# Patient Record
Sex: MALE | Race: WHITE | NOT HISPANIC OR LATINO | Employment: UNEMPLOYED | URBAN - NONMETROPOLITAN AREA
[De-identification: names, ages, dates, MRNs, and addresses within clinical notes are randomized per-mention and may not be internally consistent; named-entity substitution may affect disease eponyms.]

---

## 2017-03-05 ENCOUNTER — APPOINTMENT (OUTPATIENT)
Dept: GENERAL RADIOLOGY | Facility: HOSPITAL | Age: 47
End: 2017-03-05

## 2017-03-05 ENCOUNTER — HOSPITAL ENCOUNTER (EMERGENCY)
Facility: HOSPITAL | Age: 47
Discharge: HOME | End: 2017-03-05
Attending: EMERGENCY MEDICINE | Admitting: EMERGENCY MEDICINE

## 2017-03-05 VITALS
DIASTOLIC BLOOD PRESSURE: 92 MMHG | BODY MASS INDEX: 42.39 KG/M2 | TEMPERATURE: 98.4 F | HEART RATE: 88 BPM | WEIGHT: 313 LBS | OXYGEN SATURATION: 99 % | RESPIRATION RATE: 14 BRPM | HEIGHT: 72 IN | SYSTOLIC BLOOD PRESSURE: 140 MMHG

## 2017-03-05 DIAGNOSIS — R07.81 RIB PAIN ON RIGHT SIDE: Primary | ICD-10-CM

## 2017-03-05 PROCEDURE — 96372 THER/PROPH/DIAG INJ SC/IM: CPT

## 2017-03-05 PROCEDURE — 71101 X-RAY EXAM UNILAT RIBS/CHEST: CPT

## 2017-03-05 PROCEDURE — 99283 EMERGENCY DEPT VISIT LOW MDM: CPT

## 2017-03-05 PROCEDURE — 25010000002 KETOROLAC TROMETHAMINE PER 15 MG: Performed by: EMERGENCY MEDICINE

## 2017-03-05 RX ORDER — NAPROXEN 500 MG/1
500 TABLET ORAL 2 TIMES DAILY PRN
Qty: 14 TABLET | Refills: 0 | OUTPATIENT
Start: 2017-03-05 | End: 2020-10-05

## 2017-03-05 RX ORDER — HYDROCODONE BITARTRATE AND ACETAMINOPHEN 5; 325 MG/1; MG/1
1 TABLET ORAL EVERY 6 HOURS PRN
Qty: 12 TABLET | Refills: 0 | Status: SHIPPED | OUTPATIENT
Start: 2017-03-05 | End: 2021-07-07 | Stop reason: HOSPADM

## 2017-03-05 RX ORDER — HYDROCODONE BITARTRATE AND ACETAMINOPHEN 5; 325 MG/1; MG/1
1 TABLET ORAL ONCE
Status: COMPLETED | OUTPATIENT
Start: 2017-03-05 | End: 2017-03-05

## 2017-03-05 RX ORDER — KETOROLAC TROMETHAMINE 30 MG/ML
60 INJECTION, SOLUTION INTRAMUSCULAR; INTRAVENOUS ONCE
Status: COMPLETED | OUTPATIENT
Start: 2017-03-05 | End: 2017-03-05

## 2017-03-05 RX ADMIN — KETOROLAC TROMETHAMINE 60 MG: 30 INJECTION, SOLUTION INTRAMUSCULAR at 19:44

## 2017-03-05 RX ADMIN — HYDROCODONE BITARTRATE AND ACETAMINOPHEN 1 TABLET: 5; 325 TABLET ORAL at 21:06

## 2017-03-06 NOTE — DISCHARGE INSTRUCTIONS
"Most recent vital signs:   Visit Vitals   • /97   • Pulse 94   • Temp 98.2 °F (36.8 °C) (Oral)   • Resp 20   • Ht 72\" (182.9 cm)   • Wt (!) 313 lb (142 kg)   • SpO2 96%   • BMI 42.45 kg/m2        Below you fill find any summaries of imaging results and further discharge instructions as discussed during your visit.     XR Ribs Right With PA Chest    (Results Pending)        --PLEASE READ THESE DIRECTIONS IN FULL--     Our goal is to provide the best care we can AND to find any medical or surgical emergency while you are in the ER. If a medical or surgical emergency was not identified during your visit (or if the issue was resolved during the visit), following these directions for follow-up with a primary care provider and/or specialists and following the return precautions will be the safest and most effective way to protect your health after leaving the emergency room.     Therefore, in addition to the conversation we had in the room, please read all of the information in these discharge instructions, take medications as directed, and follow-up as directed.     You should seek immediate medical help for:     Worsening pain that is not helped with prescribed pain medicines and/or the recommended doses of over the counter pain medicines such as acetaminophen (Tylenol) or ibuprofen (Motrin/Advil)*.   New or worsening chest pain or trouble breathing   New or worsening headache, confusion, weakness, or visual changes   New or worsening fever (>100.4 F) that does not improve with the recommended doses of over the counter fever treatments such as acetaminophen (Tylenol) or ibuprofen (Motrin/Advil)* for more than a few hours.   Any other concerns that you feel could be life, limb, or eye-sight threatening     As a reminder, if you received any medicines or prescriptions for medicines that may be sedating (\"narcotics\", \"opioids\"), do NOT:   - operate any vehicles   - take if you are already tired   - take if you have " had or plan to have alcohol   - perform other responsibilities that require your full attention.     Common medications include, but are not limited to:   Opiates: Morphine, Norco, Lortab, Vicodin, Percocet, oxycodone, hydrocodone, Dilaudid, hydromorphone   Benzodiazepines: Ativan, Valium, alprazolam, lorazepam, diazepam,   Other sedating medications: promethazine, Phenergan, trazodone, Benadryl, hydroxyzine, Vistaril, diphenhydramine, zolpidem, and Ambien     *If you have any history of GI (stomach/intestinal) bleeding, allergic reactions, kidney problems, and/or certain heart problems or there is any chance you could be pregnant, and have been told to avoid NSAIDs (ibuprofen, naproxen, Aleve, Motrin, Advil) please avoid these medications. Please remember that prescribed pain medicines often contain Tylenol/acetaminophen, so make sure your total daily (24 hour) intake does not exceed 4 grams or 4000mg.

## 2017-03-06 NOTE — ED PROVIDER NOTES
TRIAGE CHIEF COMPLAINT:   Chief Complaint   Patient presents with   • Rib Injury      HPI: Kb Minaya is a 47 y.o. male who presents to the emergency department complaining of pain in his right lower ribs.  Patient states he is trying to pop his wife's back yesterday when he did he felt a pop in his right lower ribs and spine having increasing pain since that time.  Describes sharp pain is worse with palpation or with movement or deep inspiration.  Denies shortness of breath only pain when he tries breathes.     REVIEW OF SYSTEMS: Otherwise negative unless stated above     PAST MEDICAL HISTORY:   Past Medical History   Diagnosis Date   • Myocardial infarction    • Pilonidal cyst    • Plantar fasciitis         FAMILY HISTORY:   History reviewed. No pertinent family history.     SOCIAL HISTORY:   Social History     Social History   • Marital status:      Spouse name: N/A   • Number of children: N/A   • Years of education: N/A     Occupational History   • Not on file.     Social History Main Topics   • Smoking status: Never Smoker   • Smokeless tobacco: Not on file   • Alcohol use No   • Drug use: No   • Sexual activity: Defer     Other Topics Concern   • Not on file     Social History Narrative   • No narrative on file        SURGICAL HISTORY:   Past Surgical History   Procedure Laterality Date   • Coronary angioplasty     • Colonoscopy     • Plantar fascia release          CURRENT MEDICATIONS:      Medication List      Notice     You have not been prescribed any medications.         ALLERGIES: Morphine and related; Penicillins; and Pradaxa [dabigatran etexilate mesylate]     PHYSICAL EXAM:   VITAL SIGNS:   Vitals:    03/05/17 1931   BP: 165/97   Pulse: 94   Resp: 20   Temp: 98.2 °F (36.8 °C)   SpO2: 96%      CONSTITUTIONAL: Awake, oriented, appears non-toxic   HENT: Atraumatic, normocephalic, oral mucosa pink and moist, airway patent.  EYES: Conjunctiva clear  NECK: Trachea midline, non-tender, supple    CARDIOVASCULAR: Normal heart rate, Normal rhythm, No murmurs, rubs, gallops   PULMONARY/CHEST: Clear to auscultation, no rhonchi, wheezes, or rales. Symmetrical breath sounds.  There is tenderness to palpation along the lower right border of the rib cage from the center of the chest laterally to the right side.  ABDOMINAL: Non-distended, soft, non-tender - no rebound or guarding.   NEUROLOGIC: Non-focal, moving all four extremities, no gross sensory or motor deficits.   EXTREMITIES: No clubbing, cyanosis, or edema   SKIN: Warm, Dry, No erythema, No rash     ED COURSE / MEDICAL DECISION MAKING:   Kb Minaya is a 47 y.o. male who presents to the emergency department for evaluation of right lower rib pain.  Patient in no acute distress on arrival.  Vital signs are stable.  Exam does reveal tenderness to palpation in the right lower ribs.  Obtained rib films for further evaluation.  Per my interpretation did not reveal any obvious fracture dislocation.  No pneumothorax.  We will treat the patient symptomatically.  Return precautions discussed.    DECISION TO DISCHARGE/ADMIT: see ED care timeline     FINAL IMPRESSION:   1 -- rib sprain   2 --   3 --     Electronically signed by: Hannah Norris MD, 3/5/2017 7:38 PM       Hannah Norris MD  03/05/17 2052

## 2017-03-10 ENCOUNTER — APPOINTMENT (OUTPATIENT)
Dept: CT IMAGING | Facility: HOSPITAL | Age: 47
End: 2017-03-10

## 2017-03-10 ENCOUNTER — HOSPITAL ENCOUNTER (EMERGENCY)
Facility: HOSPITAL | Age: 47
Discharge: HOME | End: 2017-03-10
Attending: EMERGENCY MEDICINE | Admitting: EMERGENCY MEDICINE

## 2017-03-10 VITALS
OXYGEN SATURATION: 97 % | HEIGHT: 72 IN | SYSTOLIC BLOOD PRESSURE: 166 MMHG | BODY MASS INDEX: 42.39 KG/M2 | TEMPERATURE: 97.9 F | RESPIRATION RATE: 18 BRPM | HEART RATE: 69 BPM | WEIGHT: 313 LBS | DIASTOLIC BLOOD PRESSURE: 103 MMHG

## 2017-03-10 DIAGNOSIS — IMO0001 ELEVATED BLOOD PRESSURE: ICD-10-CM

## 2017-03-10 DIAGNOSIS — R07.81 RIB PAIN: Primary | ICD-10-CM

## 2017-03-10 PROCEDURE — 96372 THER/PROPH/DIAG INJ SC/IM: CPT

## 2017-03-10 PROCEDURE — 99282 EMERGENCY DEPT VISIT SF MDM: CPT

## 2017-03-10 PROCEDURE — 25010000002 KETOROLAC TROMETHAMINE PER 15 MG: Performed by: EMERGENCY MEDICINE

## 2017-03-10 PROCEDURE — 71250 CT THORAX DX C-: CPT

## 2017-03-10 RX ORDER — CYCLOBENZAPRINE HCL 5 MG
5 TABLET ORAL 3 TIMES DAILY PRN
Qty: 15 TABLET | Refills: 0 | Status: SHIPPED | OUTPATIENT
Start: 2017-03-10 | End: 2018-01-05

## 2017-03-10 RX ORDER — HYDROCODONE BITARTRATE AND ACETAMINOPHEN 5; 325 MG/1; MG/1
1 TABLET ORAL EVERY 6 HOURS PRN
Qty: 10 TABLET | Refills: 0 | Status: SHIPPED | OUTPATIENT
Start: 2017-03-10 | End: 2021-07-07 | Stop reason: HOSPADM

## 2017-03-10 RX ORDER — NAPROXEN 500 MG/1
500 TABLET ORAL 2 TIMES DAILY PRN
Qty: 14 TABLET | Refills: 0 | Status: SHIPPED | OUTPATIENT
Start: 2017-03-10 | End: 2018-01-05

## 2017-03-10 RX ORDER — KETOROLAC TROMETHAMINE 30 MG/ML
60 INJECTION, SOLUTION INTRAMUSCULAR; INTRAVENOUS ONCE
Status: COMPLETED | OUTPATIENT
Start: 2017-03-10 | End: 2017-03-10

## 2017-03-10 RX ADMIN — KETOROLAC TROMETHAMINE 60 MG: 30 INJECTION, SOLUTION INTRAMUSCULAR at 15:36

## 2017-03-10 NOTE — ED PROVIDER NOTES
TRIAGE CHIEF COMPLAINT:   Chief Complaint   Patient presents with   • Rib Injury      HPI: Kb Minaya is a 47 y.o. male who presents to the emergency department complaining of some right-sided rib discomfort.  Patient injured ribs approximately a week earlier and was seen in the emergency department with negative x-rays at that time.  Patient states the past few days the pain has been worse and he accidentally cough to the other night and the pain is been very severe since that time.  Denies any additional trauma.  States it is painful to breathe in that spot.     REVIEW OF SYSTEMS: Otherwise negative unless stated above     PAST MEDICAL HISTORY:   Past Medical History   Diagnosis Date   • Myocardial infarction    • Pilonidal cyst    • Plantar fasciitis         FAMILY HISTORY:   History reviewed. No pertinent family history.     SOCIAL HISTORY:   Social History     Social History   • Marital status:      Spouse name: N/A   • Number of children: N/A   • Years of education: N/A     Occupational History   • Not on file.     Social History Main Topics   • Smoking status: Never Smoker   • Smokeless tobacco: Not on file   • Alcohol use No   • Drug use: No   • Sexual activity: Defer     Other Topics Concern   • Not on file     Social History Narrative        SURGICAL HISTORY:   Past Surgical History   Procedure Laterality Date   • Coronary angioplasty     • Colonoscopy     • Plantar fascia release          CURRENT MEDICATIONS:      Medication List      ASK your doctor about these medications          HYDROcodone-acetaminophen 5-325 MG per tablet   Commonly known as:  NORCO   Take 1 tablet by mouth Every 6 (Six) Hours As Needed for moderate pain   (4-6).       naproxen 500 MG tablet   Commonly known as:  NAPROSYN   Take 1 tablet by mouth 2 (Two) Times a Day As Needed for mild pain (1-3).            ALLERGIES: Morphine and related; Penicillins; and Pradaxa [dabigatran etexilate mesylate]     PHYSICAL EXAM:    VITAL SIGNS:   Vitals:    03/10/17 1504   BP: (!) 182/102   Pulse: 87   Resp: 18   Temp: 98 °F (36.7 °C)   SpO2: 96%      CONSTITUTIONAL: Awake, oriented, appears non-toxic   HENT: Atraumatic, normocephalic, oral mucosa pink and moist, airway patent.   EYES: Conjunctiva clear  NECK: Trachea midline, non-tender, supple   CARDIOVASCULAR: Normal heart rate, Normal rhythm, No murmurs, rubs, gallops   PULMONARY/CHEST: Clear to auscultation, no rhonchi, wheezes, or rales. Symmetrical breath sounds.  Moderate tenderness in the anterolateral right-sided ribs along the lower border of the rib cage   ABDOMINAL: Non-distended, soft, non-tender - no rebound or guarding.  NEUROLOGIC: Non-focal, moving all four extremities, no gross sensory or motor deficits.   EXTREMITIES: No clubbing, cyanosis, or edema   SKIN: Warm, Dry, No erythema, No rash     ED COURSE / MEDICAL DECISION MAKING:   Kb Minaya is a 47 y.o. male who presents to the emergency department for evaluation of right-sided rib pain.  He had injured his ribs previously.  No signs or symptoms of coronary disease or blood clots, history and physical consistent with injury to ribs.  Don't feel labs or EKG are indicated.  Did obtain a CT scan to rule out rib fracture.  This did not reveal any acute abnormalities.  I suspect a muscular injury within the ribs.  We will continue to treat symptomatically and have him follow with his primary care physician.    DECISION TO DISCHARGE/ADMIT: see ED care timeline     FINAL IMPRESSION:   1 -- rib pain   2 -- elevated blood pressure  3 --     Electronically signed by: Hannah Norris MD, 3/10/2017 3:16 PM       Hannah Norris MD  03/10/17 3491

## 2017-03-10 NOTE — DISCHARGE INSTRUCTIONS
Please follow-up with your primary care physician and have your blood pressure rechecked as you might require treatment with blood pressure medication.    Below you fill find any summaries of imaging results and further discharge instructions as discussed during your visit.     CT Chest Without Contrast   Final Result   No acute process.       943.06 mGy.cm             This study was performed with techniques to keep radiation doses as low   as reasonably achievable (ALARA). Individualized dose reduction   techniques using automated exposure control or adjustment of mA and/or   kV according to the patient size were employed.        This report was finalized on 3/10/2017 3:58 PM by Chi Patel M.D..           --PLEASE READ THESE DIRECTIONS IN FULL--     Our goal is to provide the best care we can AND to find any medical or surgical emergency while you are in the ER. If a medical or surgical emergency was not identified during your visit (or if the issue was resolved during the visit), following these directions for follow-up with a primary care provider and/or specialists and following the return precautions will be the safest and most effective way to protect your health after leaving the emergency room.     Therefore, in addition to the conversation we had in the room, please read all of the information in these discharge instructions, take medications as directed, and follow-up as directed.     You should seek immediate medical help for:     Worsening pain that is not helped with prescribed pain medicines and/or the recommended doses of over the counter pain medicines such as acetaminophen (Tylenol) or ibuprofen (Motrin/Advil)*.   New or worsening chest pain or trouble breathing   New or worsening headache, confusion, weakness, or visual changes   New or worsening fever (>100.4 F) that does not improve with the recommended doses of over the counter fever treatments such as acetaminophen (Tylenol) or  "ibuprofen (Motrin/Advil)* for more than a few hours.   Any other concerns that you feel could be life, limb, or eye-sight threatening     As a reminder, if you received any medicines or prescriptions for medicines that may be sedating (\"narcotics\", \"opioids\"), do NOT:   - operate any vehicles   - take if you are already tired   - take if you have had or plan to have alcohol   - perform other responsibilities that require your full attention.     Common medications include, but are not limited to:   Opiates: Morphine, Norco, Lortab, Vicodin, Percocet, oxycodone, hydrocodone, Dilaudid, hydromorphone   Benzodiazepines: Ativan, Valium, alprazolam, lorazepam, diazepam,   Other sedating medications: promethazine, Phenergan, trazodone, Benadryl, hydroxyzine, Vistaril, diphenhydramine, zolpidem, and Ambien     *If you have any history of GI (stomach/intestinal) bleeding, allergic reactions, kidney problems, and/or certain heart problems or there is any chance you could be pregnant, and have been told to avoid NSAIDs (ibuprofen, naproxen, Aleve, Motrin, Advil) please avoid these medications. Please remember that prescribed pain medicines often contain Tylenol/acetaminophen, so make sure your total daily (24 hour) intake does not exceed 4 grams or 4000mg.    "

## 2017-06-26 ENCOUNTER — HOSPITAL ENCOUNTER (EMERGENCY)
Facility: HOSPITAL | Age: 47
Discharge: HOME | End: 2017-06-26
Attending: EMERGENCY MEDICINE | Admitting: EMERGENCY MEDICINE

## 2017-06-26 VITALS
WEIGHT: 308 LBS | OXYGEN SATURATION: 97 % | DIASTOLIC BLOOD PRESSURE: 88 MMHG | HEART RATE: 79 BPM | RESPIRATION RATE: 17 BRPM | SYSTOLIC BLOOD PRESSURE: 141 MMHG | BODY MASS INDEX: 40.82 KG/M2 | HEIGHT: 73 IN | TEMPERATURE: 98.2 F

## 2017-06-26 DIAGNOSIS — G89.29 ACUTE EXACERBATION OF CHRONIC LOW BACK PAIN: Primary | ICD-10-CM

## 2017-06-26 DIAGNOSIS — M54.50 ACUTE EXACERBATION OF CHRONIC LOW BACK PAIN: Primary | ICD-10-CM

## 2017-06-26 DIAGNOSIS — M53.87 SCIATICA ASSOCIATED WITH DISORDER OF LUMBOSACRAL SPINE: ICD-10-CM

## 2017-06-26 PROCEDURE — 25010000002 KETOROLAC TROMETHAMINE PER 15 MG: Performed by: EMERGENCY MEDICINE

## 2017-06-26 PROCEDURE — 99283 EMERGENCY DEPT VISIT LOW MDM: CPT

## 2017-06-26 PROCEDURE — 96372 THER/PROPH/DIAG INJ SC/IM: CPT

## 2017-06-26 PROCEDURE — 25010000002 TRIAMCINOLONE PER 10 MG: Performed by: EMERGENCY MEDICINE

## 2017-06-26 PROCEDURE — 25010000002 ORPHENADRINE CITRATE PER 60 MG: Performed by: EMERGENCY MEDICINE

## 2017-06-26 RX ORDER — METHYLPREDNISOLONE 4 MG/1
TABLET ORAL
Qty: 21 TABLET | Refills: 0 | Status: SHIPPED | OUTPATIENT
Start: 2017-06-26 | End: 2021-08-23 | Stop reason: HOSPADM

## 2017-06-26 RX ORDER — HYDROCODONE BITARTRATE AND ACETAMINOPHEN 7.5; 325 MG/1; MG/1
1 TABLET ORAL ONCE
Status: COMPLETED | OUTPATIENT
Start: 2017-06-26 | End: 2017-06-26

## 2017-06-26 RX ORDER — HYDROCODONE BITARTRATE AND ACETAMINOPHEN 7.5; 325 MG/1; MG/1
1 TABLET ORAL EVERY 8 HOURS PRN
Qty: 14 TABLET | Refills: 0 | Status: SHIPPED | OUTPATIENT
Start: 2017-06-26 | End: 2021-07-06

## 2017-06-26 RX ORDER — TRIAMCINOLONE ACETONIDE 40 MG/ML
80 INJECTION, SUSPENSION INTRA-ARTICULAR; INTRAMUSCULAR ONCE
Status: COMPLETED | OUTPATIENT
Start: 2017-06-26 | End: 2017-06-26

## 2017-06-26 RX ORDER — KETOROLAC TROMETHAMINE 30 MG/ML
60 INJECTION, SOLUTION INTRAMUSCULAR; INTRAVENOUS ONCE
Status: COMPLETED | OUTPATIENT
Start: 2017-06-26 | End: 2017-06-26

## 2017-06-26 RX ORDER — ORPHENADRINE CITRATE 30 MG/ML
60 INJECTION INTRAMUSCULAR; INTRAVENOUS EVERY 12 HOURS
Status: DISCONTINUED | OUTPATIENT
Start: 2017-06-26 | End: 2017-06-26 | Stop reason: HOSPADM

## 2017-06-26 RX ORDER — CYCLOBENZAPRINE HCL 10 MG
10 TABLET ORAL 3 TIMES DAILY
Qty: 20 TABLET | Refills: 0 | Status: SHIPPED | OUTPATIENT
Start: 2017-06-26 | End: 2021-07-06

## 2017-06-26 RX ADMIN — KETOROLAC TROMETHAMINE 60 MG: 60 INJECTION, SOLUTION INTRAMUSCULAR at 18:31

## 2017-06-26 RX ADMIN — TRIAMCINOLONE ACETONIDE 80 MG: 40 INJECTION, SUSPENSION INTRA-ARTICULAR; INTRAMUSCULAR at 18:35

## 2017-06-26 RX ADMIN — HYDROCODONE BITARTRATE AND ACETAMINOPHEN 1 TABLET: 7.5; 325 TABLET ORAL at 18:30

## 2017-06-26 RX ADMIN — ORPHENADRINE CITRATE 60 MG: 30 INJECTION INTRAMUSCULAR; INTRAVENOUS at 18:32

## 2017-06-26 NOTE — ED PROVIDER NOTES
Subjective   HPI Comments: Patient presents to the emergency department with complaint of acute and chronic low back pain.  Patient has been tolerating low back pain for several years and has had a pain management provider Q's steroid injections in his back a few years ago which were ineffective.  When he lost his insurance shortly thereafter, he has been managing his pain at home often without success.  His pain began to exacerbate in the last 3 weeks after going back to work after a year of unemployment.  Patient states he is very active at work and climbs ladders often.  His pain radiates into both hips.  He has a history of neuropathy to both feet.  Denies any falls.  No saddle anesthesia      History provided by:  Patient and spouse   used: No        Review of Systems   Constitutional: Negative.  Negative for diaphoresis and fever.   HENT: Negative for sore throat.    Eyes: Negative.  Negative for pain and visual disturbance.   Respiratory: Negative for cough, shortness of breath, wheezing and stridor.    Cardiovascular: Negative.  Negative for chest pain.   Gastrointestinal: Negative.  Negative for abdominal pain, diarrhea, nausea and vomiting.   Endocrine: Negative.    Genitourinary: Negative.  Negative for dysuria.   Musculoskeletal: Positive for back pain. Negative for neck pain.   Skin: Negative.  Negative for pallor and rash.   Allergic/Immunologic: Negative.    Neurological: Negative.  Negative for syncope, light-headedness and headaches.   Hematological: Negative.    Psychiatric/Behavioral: Negative.  Negative for agitation.   All other systems reviewed and are negative.      Past Medical History:   Diagnosis Date   • Myocardial infarction    • Pilonidal cyst    • Plantar fasciitis        Allergies   Allergen Reactions   • Morphine And Related Anaphylaxis   • Penicillins Hives   • Pradaxa [Dabigatran Etexilate Mesylate] Hives       Past Surgical History:   Procedure Laterality Date    • APPENDECTOMY     • CARDIAC CATHETERIZATION     • COLONOSCOPY     • CORONARY ANGIOPLASTY     • PLANTAR FASCIA RELEASE         History reviewed. No pertinent family history.    Social History     Social History   • Marital status:      Spouse name: N/A   • Number of children: N/A   • Years of education: N/A     Social History Main Topics   • Smoking status: Never Smoker   • Smokeless tobacco: None   • Alcohol use No   • Drug use: No   • Sexual activity: Defer     Other Topics Concern   • None     Social History Narrative   • None           Objective   Physical Exam   Constitutional: He is oriented to person, place, and time. He appears well-developed and well-nourished. No distress.   Patient is obese   HENT:   Head: Normocephalic and atraumatic.   Right Ear: External ear normal.   Left Ear: External ear normal.   Eyes: Conjunctivae and EOM are normal. Pupils are equal, round, and reactive to light.   Neck: Normal range of motion. Neck supple. No tracheal deviation present.   Cardiovascular: Normal rate and regular rhythm.    Pulmonary/Chest: Effort normal. He has no wheezes. He has no rales.   Abdominal: Soft. Bowel sounds are normal. He exhibits no distension. There is no rebound and no guarding.   Musculoskeletal: Normal range of motion. He exhibits no edema.   Patient has diffuse pain out of proportion to physical findings with light palpation of the lumbar paravertebral muscles.  Straight leg raise is negative bilaterally   Lymphadenopathy:     He has no cervical adenopathy.   Neurological: He is alert and oriented to person, place, and time. He displays normal reflexes. Coordination normal.   Skin: Skin is warm and dry. Rash noted. He is not diaphoretic. No erythema. No pallor.   Psychiatric: He has a normal mood and affect.   Nursing note and vitals reviewed.      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

## 2017-08-04 ENCOUNTER — HOSPITAL ENCOUNTER (EMERGENCY)
Facility: HOSPITAL | Age: 47
Discharge: HOME | End: 2017-08-05
Attending: EMERGENCY MEDICINE | Admitting: EMERGENCY MEDICINE

## 2017-08-04 DIAGNOSIS — K08.89 PAIN, DENTAL: Primary | ICD-10-CM

## 2017-08-04 PROCEDURE — 96372 THER/PROPH/DIAG INJ SC/IM: CPT

## 2017-08-04 PROCEDURE — 99283 EMERGENCY DEPT VISIT LOW MDM: CPT

## 2017-08-04 RX ORDER — KETOROLAC TROMETHAMINE 30 MG/ML
60 INJECTION, SOLUTION INTRAMUSCULAR; INTRAVENOUS ONCE
Status: COMPLETED | OUTPATIENT
Start: 2017-08-04 | End: 2017-08-05

## 2017-08-04 RX ORDER — ONDANSETRON 4 MG/1
4 TABLET, ORALLY DISINTEGRATING ORAL EVERY 6 HOURS PRN
Qty: 10 TABLET | Refills: 0 | Status: SHIPPED | OUTPATIENT
Start: 2017-08-04 | End: 2021-07-07 | Stop reason: HOSPADM

## 2017-08-04 RX ORDER — CLINDAMYCIN HYDROCHLORIDE 300 MG/1
300 CAPSULE ORAL 3 TIMES DAILY
COMMUNITY
End: 2021-07-06

## 2017-08-04 RX ORDER — OXYCODONE AND ACETAMINOPHEN 10; 325 MG/1; MG/1
1 TABLET ORAL ONCE
Status: COMPLETED | OUTPATIENT
Start: 2017-08-04 | End: 2017-08-05

## 2017-08-05 VITALS
WEIGHT: 306 LBS | SYSTOLIC BLOOD PRESSURE: 135 MMHG | TEMPERATURE: 98.4 F | HEART RATE: 66 BPM | HEIGHT: 73 IN | BODY MASS INDEX: 40.56 KG/M2 | RESPIRATION RATE: 16 BRPM | DIASTOLIC BLOOD PRESSURE: 89 MMHG | OXYGEN SATURATION: 95 %

## 2017-08-05 PROCEDURE — 25010000002 KETOROLAC TROMETHAMINE PER 15 MG: Performed by: PHYSICIAN ASSISTANT

## 2017-08-05 PROCEDURE — 96372 THER/PROPH/DIAG INJ SC/IM: CPT

## 2017-08-05 RX ADMIN — OXYCODONE HYDROCHLORIDE AND ACETAMINOPHEN 1 TABLET: 10; 325 TABLET ORAL at 00:20

## 2017-08-05 RX ADMIN — KETOROLAC TROMETHAMINE 60 MG: 60 INJECTION, SOLUTION INTRAMUSCULAR at 00:21

## 2017-08-05 NOTE — ED PROVIDER NOTES
Subjective   HPI Comments: 47-year-old male in the emergency room with his wife.  The patient is complaining of dental pain.  Patient saw his dentist at [facility] yesterday, he was diagnosed with a inflamed wisdom tooth.  He has taken 4 dosages of a 10 day course of clindamycin 300.  He was not given anything for pain.  He has a referral to see an oral surgeon at  on September 1.  Denies any fever, he does not have any difficulty swallowing, denies difficulty breathing.  He states he has been nauseated but denies any vomiting or diarrhea.      History provided by:  Patient and spouse  History limited by: no limits.   used: No        Review of Systems   Constitutional: Negative for activity change, appetite change, fatigue and fever.   HENT: Negative for congestion, ear pain, facial swelling, rhinorrhea, sneezing, sore throat and trouble swallowing.    Eyes: Negative for pain, discharge and redness.   Respiratory: Negative for cough, shortness of breath and wheezing.    Cardiovascular: Negative.    Gastrointestinal: Negative for abdominal pain, constipation, diarrhea, nausea and vomiting.   Endocrine: Negative.    Genitourinary: Negative for difficulty urinating, dysuria and frequency.   Musculoskeletal: Negative for back pain and neck pain.   Skin: Negative for color change, pallor and rash.   Allergic/Immunologic: Negative.    Neurological: Negative.    Hematological: Negative.    Psychiatric/Behavioral: Negative.    All other systems reviewed and are negative.      Past Medical History:   Diagnosis Date   • Myocardial infarction    • Pilonidal cyst    • Plantar fasciitis        Allergies   Allergen Reactions   • Morphine And Related Anaphylaxis   • Penicillins Hives   • Pradaxa [Dabigatran Etexilate Mesylate] Hives       Past Surgical History:   Procedure Laterality Date   • APPENDECTOMY     • CARDIAC CATHETERIZATION     • COLONOSCOPY     • CORONARY ANGIOPLASTY     • PLANTAR FASCIA RELEASE          History reviewed. No pertinent family history.    Social History     Social History   • Marital status:      Spouse name: N/A   • Number of children: N/A   • Years of education: N/A     Social History Main Topics   • Smoking status: Never Smoker   • Smokeless tobacco: None   • Alcohol use No   • Drug use: No   • Sexual activity: Defer     Other Topics Concern   • None     Social History Narrative           Objective   Physical Exam   Constitutional: He is oriented to person, place, and time. He appears well-developed and well-nourished. No distress.   HENT:   Head: Normocephalic and atraumatic.   Right Ear: External ear normal.   Left Ear: External ear normal.   Nose: Nose normal.   Mouth/Throat: Oropharynx is clear and moist. No oropharyngeal exudate.   Left lower back wisdom tooth has not erupted to the gum yet.  The gum is pink there does not appear to any swelling or redness.  There is no visible facial swelling or redness.  Airway is patent.   Eyes: Conjunctivae and EOM are normal. Pupils are equal, round, and reactive to light. Right eye exhibits no discharge. Left eye exhibits no discharge. No scleral icterus.   Neck: Normal range of motion. Neck supple. No JVD present. No tracheal deviation present.   Cardiovascular: Normal rate, regular rhythm and normal heart sounds.    Pulmonary/Chest: Effort normal and breath sounds normal. No stridor. No respiratory distress. He has no wheezes. He has no rales.   Abdominal: Soft. Bowel sounds are normal. He exhibits no mass. There is no tenderness. There is no rebound and no guarding. No hernia.   Musculoskeletal: Normal range of motion. He exhibits no edema, tenderness or deformity.   Lymphadenopathy:     He has no cervical adenopathy.   Neurological: He is alert and oriented to person, place, and time. No cranial nerve deficit. He exhibits normal muscle tone. Coordination normal.   Skin: Skin is warm and dry. No rash noted. He is not diaphoretic. No  erythema. No pallor.   Psychiatric: He has a normal mood and affect. His behavior is normal. Judgment and thought content normal.   Nursing note and vitals reviewed.      Procedures         ED Course  ED Course                  MDM    Final diagnoses:   Pain, dental            Christine SANTOSH Rodriguez  08/04/17 2549

## 2017-08-05 NOTE — DISCHARGE INSTRUCTIONS
Take all your clindamycin as directed.  Recheck with your dentist on Monday if not improving.  Return to the ER for any fever, difficulty swallowing, trouble breathing, facial swelling or any other changes that concern you.

## 2017-08-25 ENCOUNTER — APPOINTMENT (OUTPATIENT)
Dept: GENERAL RADIOLOGY | Facility: HOSPITAL | Age: 47
End: 2017-08-25

## 2017-08-25 ENCOUNTER — HOSPITAL ENCOUNTER (EMERGENCY)
Facility: HOSPITAL | Age: 47
Discharge: HOME | End: 2017-08-25
Attending: STUDENT IN AN ORGANIZED HEALTH CARE EDUCATION/TRAINING PROGRAM | Admitting: STUDENT IN AN ORGANIZED HEALTH CARE EDUCATION/TRAINING PROGRAM

## 2017-08-25 VITALS
WEIGHT: 312 LBS | RESPIRATION RATE: 20 BRPM | HEART RATE: 76 BPM | TEMPERATURE: 97.9 F | BODY MASS INDEX: 41.35 KG/M2 | SYSTOLIC BLOOD PRESSURE: 170 MMHG | DIASTOLIC BLOOD PRESSURE: 94 MMHG | HEIGHT: 73 IN | OXYGEN SATURATION: 100 %

## 2017-08-25 DIAGNOSIS — S93.509A TOE SPRAIN, INITIAL ENCOUNTER: ICD-10-CM

## 2017-08-25 DIAGNOSIS — S93.402A SPRAIN OF LEFT ANKLE, UNSPECIFIED LIGAMENT, INITIAL ENCOUNTER: Primary | ICD-10-CM

## 2017-08-25 PROCEDURE — 73610 X-RAY EXAM OF ANKLE: CPT

## 2017-08-25 PROCEDURE — 73630 X-RAY EXAM OF FOOT: CPT

## 2017-08-25 PROCEDURE — 99283 EMERGENCY DEPT VISIT LOW MDM: CPT

## 2017-08-26 NOTE — ED PROVIDER NOTES
Subjective   HPI Comments: 47-year-old male with left toe and ankle pain.  He states that he broke his left second toe several days ago get in the toe.  Is also concerned hematosis ankle.  He has pain in the left ankle and left second toe and swelling.  The pain is worse to touch and with movement      History provided by:  Patient   used: No        Review of Systems   Musculoskeletal:        Left second toe and ankle pain   All other systems reviewed and are negative.      Past Medical History:   Diagnosis Date   • Myocardial infarction    • Pilonidal cyst    • Plantar fasciitis        Allergies   Allergen Reactions   • Morphine And Related Anaphylaxis   • Penicillins Hives   • Pradaxa [Dabigatran Etexilate Mesylate] Hives       Past Surgical History:   Procedure Laterality Date   • APPENDECTOMY     • CARDIAC CATHETERIZATION     • COLONOSCOPY     • CORONARY ANGIOPLASTY     • PLANTAR FASCIA RELEASE         History reviewed. No pertinent family history.    Social History     Social History   • Marital status:      Spouse name: N/A   • Number of children: N/A   • Years of education: N/A     Social History Main Topics   • Smoking status: Never Smoker   • Smokeless tobacco: None   • Alcohol use No   • Drug use: No   • Sexual activity: Defer     Other Topics Concern   • None     Social History Narrative           Objective   Physical Exam   Constitutional: He is oriented to person, place, and time. He appears well-developed and well-nourished.   HENT:   Head: Normocephalic and atraumatic.   Left Ear: External ear normal.   Eyes: EOM are normal. Pupils are equal, round, and reactive to light.   Neck: Normal range of motion.   Cardiovascular: Normal rate and regular rhythm.    Pulmonary/Chest: Effort normal and breath sounds normal.   Abdominal: Soft. Bowel sounds are normal.   Musculoskeletal: He exhibits tenderness.   Neurological: He is alert and oriented to person, place, and time.   Skin:  Skin is warm and dry.   Psychiatric: He has a normal mood and affect. His behavior is normal. Judgment and thought content normal.   Nursing note and vitals reviewed.      Procedures         ED Course  ED Course   Comment By Time   Patient states that he has a walking boot at home that he will use for comfort Sunny Jorge Jr., PA-C 08/25 2044                  [facility]    Final diagnoses:   Sprain of left ankle, unspecified ligament, initial encounter   Toe sprain, initial encounter            Sunny Jorge Jr., PA-C  08/25/17 2046

## 2017-09-18 ENCOUNTER — APPOINTMENT (OUTPATIENT)
Dept: CT IMAGING | Facility: HOSPITAL | Age: 47
End: 2017-09-18

## 2017-09-18 ENCOUNTER — HOSPITAL ENCOUNTER (EMERGENCY)
Facility: HOSPITAL | Age: 47
Discharge: HOME | End: 2017-09-18
Attending: EMERGENCY MEDICINE | Admitting: EMERGENCY MEDICINE

## 2017-09-18 VITALS
RESPIRATION RATE: 17 BRPM | SYSTOLIC BLOOD PRESSURE: 154 MMHG | HEART RATE: 79 BPM | TEMPERATURE: 98.1 F | BODY MASS INDEX: 41.35 KG/M2 | HEIGHT: 73 IN | WEIGHT: 312 LBS | OXYGEN SATURATION: 99 % | DIASTOLIC BLOOD PRESSURE: 79 MMHG

## 2017-09-18 DIAGNOSIS — M54.5 MIDLINE LOW BACK PAIN, UNSPECIFIED CHRONICITY, WITH SCIATICA PRESENCE UNSPECIFIED: Primary | ICD-10-CM

## 2017-09-18 PROCEDURE — 72131 CT LUMBAR SPINE W/O DYE: CPT

## 2017-09-18 PROCEDURE — 99283 EMERGENCY DEPT VISIT LOW MDM: CPT

## 2017-09-18 PROCEDURE — 96372 THER/PROPH/DIAG INJ SC/IM: CPT

## 2017-09-18 PROCEDURE — 25010000002 KETOROLAC TROMETHAMINE PER 15 MG: Performed by: NURSE PRACTITIONER

## 2017-09-18 RX ORDER — KETOROLAC TROMETHAMINE 30 MG/ML
60 INJECTION, SOLUTION INTRAMUSCULAR; INTRAVENOUS ONCE
Status: COMPLETED | OUTPATIENT
Start: 2017-09-18 | End: 2017-09-18

## 2017-09-18 RX ORDER — METHOCARBAMOL 750 MG/1
750 TABLET, FILM COATED ORAL 3 TIMES DAILY
Qty: 21 TABLET | Refills: 0 | Status: SHIPPED | OUTPATIENT
Start: 2017-09-18 | End: 2021-08-23 | Stop reason: HOSPADM

## 2017-09-18 RX ORDER — MELOXICAM 7.5 MG/1
7.5 TABLET ORAL DAILY
Qty: 14 TABLET | Refills: 0 | Status: SHIPPED | OUTPATIENT
Start: 2017-09-18 | End: 2021-08-23 | Stop reason: HOSPADM

## 2017-09-18 RX ADMIN — KETOROLAC TROMETHAMINE 60 MG: 60 INJECTION, SOLUTION INTRAMUSCULAR at 18:15

## 2017-09-18 NOTE — ED PROVIDER NOTES
Subjective   History of Present Illness  This is a 47-year-old gentleman who comes in today complaining of lower back pain.  Patient reports he has a history of degenerative disc disease and he lost his balance yesterday and fell down a couple stairs hitting his lower back and landing on his bottom.  He denies any other injuries.  Review of Systems   Constitutional: Negative.    HENT: Negative.    Eyes: Negative.    Respiratory: Negative.    Cardiovascular: Negative.    Gastrointestinal: Negative.    Genitourinary: Negative.    Musculoskeletal: Positive for back pain.   Skin: Negative.    Neurological: Negative.    Psychiatric/Behavioral: Negative.    All other systems reviewed and are negative.      Past Medical History:   Diagnosis Date   • Myocardial infarction    • Pilonidal cyst    • Plantar fasciitis        Allergies   Allergen Reactions   • Morphine And Related Anaphylaxis   • Penicillins Hives   • Pradaxa [Dabigatran Etexilate Mesylate] Hives       Past Surgical History:   Procedure Laterality Date   • APPENDECTOMY     • CARDIAC CATHETERIZATION     • COLONOSCOPY     • CORONARY ANGIOPLASTY     • PLANTAR FASCIA RELEASE         History reviewed. No pertinent family history.    Social History     Social History   • Marital status:      Spouse name: N/A   • Number of children: N/A   • Years of education: N/A     Social History Main Topics   • Smoking status: Never Smoker   • Smokeless tobacco: None   • Alcohol use No   • Drug use: No   • Sexual activity: Defer     Other Topics Concern   • None     Social History Narrative           Objective   Physical Exam   Constitutional: He appears well-developed and well-nourished.   Nursing note and vitals reviewed.  GEN: No acute distress  Head: Normocephalic, atraumatic  Eyes: Pupils equal round reactive to light  ENT: Posterior pharynx normal in appearance, oral mucosa is moist  Chest: Nontender to palpation  Cardiovascular: Regular rate  Lungs: Clear to  auscultation bilaterally  Abdomen: Soft, nontender, nondistended, no peritoneal signs  Extremities: No edema, normal appearance  Tender L5-S1 data straight leg raise  Neuro: GCS 15  Psych: Mood and affect are appropriate      Procedures         ED Course  ED Course                  MDM  Number of Diagnoses or Management Options  Diagnosis management comments: In reviewing medical records he's been to the emergency department with pain complaints 8 times in the last 7 months.  When questioned about his chronic pain he states he's been out of his pain medications that he takes for his chronic back pain for the last year due to losing his health insurance and not been able to afford to go to the doctor.  I suspect this may be an exacerbation of his degenerative disc disease we will go ahead and do a CT of his L-spine since he does complain of pain after fall and he has tenderness.       Amount and/or Complexity of Data Reviewed  Tests in the radiology section of CPT®: ordered and reviewed  Review and summarize past medical records: yes    Risk of Complications, Morbidity, and/or Mortality  Presenting problems: low  Diagnostic procedures: moderate  Management options: low        Final diagnoses:   Midline low back pain, unspecified chronicity, with sciatica presence unspecified            Sharyn Rea, APRN  09/18/17 1935

## 2017-10-13 ENCOUNTER — HOSPITAL ENCOUNTER (EMERGENCY)
Facility: HOSPITAL | Age: 47
Discharge: HOME | End: 2017-10-13
Attending: EMERGENCY MEDICINE | Admitting: EMERGENCY MEDICINE

## 2017-10-13 ENCOUNTER — APPOINTMENT (OUTPATIENT)
Dept: CT IMAGING | Facility: HOSPITAL | Age: 47
End: 2017-10-13

## 2017-10-13 VITALS
SYSTOLIC BLOOD PRESSURE: 136 MMHG | RESPIRATION RATE: 18 BRPM | HEART RATE: 56 BPM | HEIGHT: 73 IN | OXYGEN SATURATION: 97 % | BODY MASS INDEX: 39.76 KG/M2 | WEIGHT: 300 LBS | DIASTOLIC BLOOD PRESSURE: 85 MMHG | TEMPERATURE: 98.2 F

## 2017-10-13 DIAGNOSIS — G44.89 OTHER HEADACHE SYNDROME: Primary | ICD-10-CM

## 2017-10-13 DIAGNOSIS — R11.2 NAUSEA AND VOMITING, INTRACTABILITY OF VOMITING NOT SPECIFIED, UNSPECIFIED VOMITING TYPE: ICD-10-CM

## 2017-10-13 DIAGNOSIS — M54.50 ACUTE EXACERBATION OF CHRONIC LOW BACK PAIN: ICD-10-CM

## 2017-10-13 DIAGNOSIS — L72.9 CUTANEOUS CYST: ICD-10-CM

## 2017-10-13 DIAGNOSIS — G89.29 ACUTE EXACERBATION OF CHRONIC LOW BACK PAIN: ICD-10-CM

## 2017-10-13 DIAGNOSIS — R03.0 ELEVATED BLOOD PRESSURE READING: ICD-10-CM

## 2017-10-13 DIAGNOSIS — I25.2 HISTORY OF MI (MYOCARDIAL INFARCTION): ICD-10-CM

## 2017-10-13 LAB
ALBUMIN SERPL-MCNC: 4.1 G/DL (ref 3.5–5)
ALBUMIN/GLOB SERPL: 1.4 G/DL (ref 1–2)
ALP SERPL-CCNC: 81 U/L (ref 38–126)
ALT SERPL W P-5'-P-CCNC: 81 U/L (ref 13–69)
AMPHET+METHAMPHET UR QL: NEGATIVE
AMPHETAMINES UR QL: NEGATIVE
ANION GAP SERPL CALCULATED.3IONS-SCNC: 13.9 MMOL/L
AST SERPL-CCNC: 43 U/L (ref 15–46)
BACTERIA UR QL AUTO: ABNORMAL /HPF
BARBITURATES UR QL SCN: NEGATIVE
BASOPHILS # BLD AUTO: 0.03 10*3/MM3 (ref 0–0.2)
BASOPHILS NFR BLD AUTO: 0.5 % (ref 0–2.5)
BENZODIAZ UR QL SCN: NEGATIVE
BILIRUB SERPL-MCNC: 0.8 MG/DL (ref 0.2–1.3)
BILIRUB UR QL STRIP: NEGATIVE
BUN BLD-MCNC: 10 MG/DL (ref 7–20)
BUN/CREAT SERPL: 11.1 (ref 6.3–21.9)
BUPRENORPHINE SERPL-MCNC: NEGATIVE NG/ML
CALCIUM SPEC-SCNC: 9.4 MG/DL (ref 8.4–10.2)
CANNABINOIDS SERPL QL: NEGATIVE
CHLORIDE SERPL-SCNC: 106 MMOL/L (ref 98–107)
CLARITY UR: CLEAR
CO2 SERPL-SCNC: 27 MMOL/L (ref 26–30)
COCAINE UR QL: NEGATIVE
COLOR UR: YELLOW
CREAT BLD-MCNC: 0.9 MG/DL (ref 0.6–1.3)
DEPRECATED RDW RBC AUTO: 45.2 FL (ref 37–54)
EOSINOPHIL # BLD AUTO: 0.11 10*3/MM3 (ref 0–0.7)
EOSINOPHIL NFR BLD AUTO: 1.8 % (ref 0–7)
ERYTHROCYTE [DISTWIDTH] IN BLOOD BY AUTOMATED COUNT: 13.3 % (ref 11.5–14.5)
GFR SERPL CREATININE-BSD FRML MDRD: 90 ML/MIN/1.73
GLOBULIN UR ELPH-MCNC: 3 GM/DL
GLUCOSE BLD-MCNC: 137 MG/DL (ref 74–98)
GLUCOSE UR STRIP-MCNC: NEGATIVE MG/DL
HCT VFR BLD AUTO: 46.2 % (ref 42–52)
HGB BLD-MCNC: 15.6 G/DL (ref 14–18)
HGB UR QL STRIP.AUTO: ABNORMAL
HYALINE CASTS UR QL AUTO: ABNORMAL /LPF
IMM GRANULOCYTES # BLD: 0.01 10*3/MM3 (ref 0–0.06)
IMM GRANULOCYTES NFR BLD: 0.2 % (ref 0–0.6)
KETONES UR QL STRIP: NEGATIVE
LEUKOCYTE ESTERASE UR QL STRIP.AUTO: NEGATIVE
LYMPHOCYTES # BLD AUTO: 1.79 10*3/MM3 (ref 0.6–3.4)
LYMPHOCYTES NFR BLD AUTO: 29.7 % (ref 10–50)
MCH RBC QN AUTO: 31.3 PG (ref 27–31)
MCHC RBC AUTO-ENTMCNC: 33.8 G/DL (ref 30–37)
MCV RBC AUTO: 92.6 FL (ref 80–94)
METHADONE UR QL SCN: NEGATIVE
MONOCYTES # BLD AUTO: 0.31 10*3/MM3 (ref 0–0.9)
MONOCYTES NFR BLD AUTO: 5.1 % (ref 0–12)
NEUTROPHILS # BLD AUTO: 3.78 10*3/MM3 (ref 2–6.9)
NEUTROPHILS NFR BLD AUTO: 62.7 % (ref 37–80)
NITRITE UR QL STRIP: NEGATIVE
NRBC BLD MANUAL-RTO: 0 /100 WBC (ref 0–0)
OPIATES UR QL: NEGATIVE
OXYCODONE UR QL SCN: NEGATIVE
PCP UR QL SCN: NEGATIVE
PH UR STRIP.AUTO: 7.5 [PH] (ref 5–8)
PLATELET # BLD AUTO: 147 10*3/MM3 (ref 130–400)
PMV BLD AUTO: 10.4 FL (ref 6–12)
POTASSIUM BLD-SCNC: 3.9 MMOL/L (ref 3.5–5.1)
PROPOXYPH UR QL: NEGATIVE
PROT SERPL-MCNC: 7.1 G/DL (ref 6.3–8.2)
PROT UR QL STRIP: NEGATIVE
RBC # BLD AUTO: 4.99 10*6/MM3 (ref 4.7–6.1)
RBC # UR: ABNORMAL /HPF
REF LAB TEST METHOD: ABNORMAL
SODIUM BLD-SCNC: 143 MMOL/L (ref 137–145)
SP GR UR STRIP: 1.02 (ref 1–1.03)
SQUAMOUS #/AREA URNS HPF: ABNORMAL /HPF
TRICYCLICS UR QL SCN: NEGATIVE
UROBILINOGEN UR QL STRIP: ABNORMAL
WBC NRBC COR # BLD: 6.03 10*3/MM3 (ref 4.8–10.8)
WBC UR QL AUTO: ABNORMAL /HPF

## 2017-10-13 PROCEDURE — 25010000002 KETOROLAC TROMETHAMINE PER 15 MG: Performed by: PHYSICIAN ASSISTANT

## 2017-10-13 PROCEDURE — 81001 URINALYSIS AUTO W/SCOPE: CPT | Performed by: PHYSICIAN ASSISTANT

## 2017-10-13 PROCEDURE — 96374 THER/PROPH/DIAG INJ IV PUSH: CPT

## 2017-10-13 PROCEDURE — 25010000002 METOCLOPRAMIDE PER 10 MG: Performed by: PHYSICIAN ASSISTANT

## 2017-10-13 PROCEDURE — 85025 COMPLETE CBC W/AUTO DIFF WBC: CPT | Performed by: PHYSICIAN ASSISTANT

## 2017-10-13 PROCEDURE — 70450 CT HEAD/BRAIN W/O DYE: CPT

## 2017-10-13 PROCEDURE — 80053 COMPREHEN METABOLIC PANEL: CPT | Performed by: PHYSICIAN ASSISTANT

## 2017-10-13 PROCEDURE — 96361 HYDRATE IV INFUSION ADD-ON: CPT

## 2017-10-13 PROCEDURE — 96375 TX/PRO/DX INJ NEW DRUG ADDON: CPT

## 2017-10-13 PROCEDURE — 25010000002 DIPHENHYDRAMINE PER 50 MG: Performed by: PHYSICIAN ASSISTANT

## 2017-10-13 PROCEDURE — 99283 EMERGENCY DEPT VISIT LOW MDM: CPT

## 2017-10-13 PROCEDURE — 80306 DRUG TEST PRSMV INSTRMNT: CPT | Performed by: EMERGENCY MEDICINE

## 2017-10-13 RX ORDER — DIPHENHYDRAMINE HYDROCHLORIDE 50 MG/ML
25 INJECTION INTRAMUSCULAR; INTRAVENOUS ONCE
Status: COMPLETED | OUTPATIENT
Start: 2017-10-13 | End: 2017-10-13

## 2017-10-13 RX ORDER — KETOROLAC TROMETHAMINE 30 MG/ML
10 INJECTION, SOLUTION INTRAMUSCULAR; INTRAVENOUS EVERY 6 HOURS PRN
Status: DISCONTINUED | OUTPATIENT
Start: 2017-10-13 | End: 2017-10-13 | Stop reason: HOSPADM

## 2017-10-13 RX ORDER — HYDROCODONE BITARTRATE AND ACETAMINOPHEN 5; 325 MG/1; MG/1
1 TABLET ORAL ONCE
Status: COMPLETED | OUTPATIENT
Start: 2017-10-13 | End: 2017-10-13

## 2017-10-13 RX ORDER — METOCLOPRAMIDE HYDROCHLORIDE 5 MG/ML
10 INJECTION INTRAMUSCULAR; INTRAVENOUS ONCE
Status: COMPLETED | OUTPATIENT
Start: 2017-10-13 | End: 2017-10-13

## 2017-10-13 RX ORDER — SODIUM CHLORIDE 0.9 % (FLUSH) 0.9 %
10 SYRINGE (ML) INJECTION AS NEEDED
Status: DISCONTINUED | OUTPATIENT
Start: 2017-10-13 | End: 2017-10-13 | Stop reason: HOSPADM

## 2017-10-13 RX ORDER — CLINDAMYCIN HYDROCHLORIDE 150 MG/1
150 CAPSULE ORAL 3 TIMES DAILY
Qty: 21 CAPSULE | Refills: 0 | Status: SHIPPED | OUTPATIENT
Start: 2017-10-13 | End: 2021-07-06

## 2017-10-13 RX ORDER — SODIUM CHLORIDE 9 MG/ML
125 INJECTION, SOLUTION INTRAVENOUS CONTINUOUS
Status: DISCONTINUED | OUTPATIENT
Start: 2017-10-13 | End: 2017-10-13 | Stop reason: HOSPADM

## 2017-10-13 RX ADMIN — SODIUM CHLORIDE 125 ML/HR: 9 INJECTION, SOLUTION INTRAVENOUS at 15:31

## 2017-10-13 RX ADMIN — HYDROCODONE BITARTRATE AND ACETAMINOPHEN 1 TABLET: 5; 325 TABLET ORAL at 16:55

## 2017-10-13 RX ADMIN — METOCLOPRAMIDE 10 MG: 5 INJECTION, SOLUTION INTRAMUSCULAR; INTRAVENOUS at 15:33

## 2017-10-13 RX ADMIN — DIPHENHYDRAMINE HYDROCHLORIDE 25 MG: 50 INJECTION, SOLUTION INTRAMUSCULAR; INTRAVENOUS at 15:32

## 2017-10-13 RX ADMIN — KETOROLAC TROMETHAMINE 10 MG: 30 INJECTION, SOLUTION INTRAMUSCULAR at 15:33

## 2017-10-13 NOTE — ED PROVIDER NOTES
Subjective   HPI Comments: 47-year-old male presents to emergency department complaining of a 2-3 days history of progressively worsening headache, worsening significantly this morning at around 2:30.  Associated with nausea and vomiting.  Also complains of flareup of his low back pain, and boils to his lower abdomen.  He denies fevers chills or sweats.  He does admit to photophobia with no decrease in visual field or blurred vision.  No recent falls or injuries.  He denies stiff neck.  No unilateral weakness paresis paresthesias.  Does complain of dizziness but no slurred speech.  No confusion.  He denies palpitations chest pain shortness of breath.  No abdominal pain.  He does have nausea with vomiting.    His past medical history is remarkable for MI, non-insulin-dependent diabetes    Patient is a 47 y.o. male presenting with headaches.   History provided by:  Patient  Headache   Pain location:  Generalized  Radiates to:  L shoulder, R shoulder and upper back  Severity currently:  8/10  Onset quality:  Gradual  Duration:  2 days  Timing:  Constant  Progression:  Worsening  Chronicity:  Recurrent  Similar to prior headaches: yes    Context: activity and bright light    Context: not coughing    Relieved by:  Nothing  Worsened by:  Nothing  Associated symptoms: back pain, dizziness, eye pain, loss of balance, nausea, photophobia and vomiting    Associated symptoms: no abdominal pain, no cough, no diarrhea, no facial pain, no fever, no hearing loss, no myalgias, no neck stiffness, no numbness and no paresthesias        Review of Systems   Constitutional: Negative for fever.   HENT: Negative for hearing loss.    Eyes: Positive for photophobia and pain.   Respiratory: Negative for cough.    Gastrointestinal: Positive for nausea and vomiting. Negative for abdominal pain and diarrhea.   Musculoskeletal: Positive for back pain. Negative for myalgias and neck stiffness.   Neurological: Positive for dizziness, headaches and  loss of balance. Negative for numbness and paresthesias.   All other systems reviewed and are negative.      Past Medical History:   Diagnosis Date   • Myocardial infarction    • Pilonidal cyst    • Plantar fasciitis        Allergies   Allergen Reactions   • Morphine And Related Anaphylaxis   • Penicillins Hives   • Pradaxa [Dabigatran Etexilate Mesylate] Hives       Past Surgical History:   Procedure Laterality Date   • APPENDECTOMY     • CARDIAC CATHETERIZATION     • COLONOSCOPY     • CORONARY ANGIOPLASTY     • PLANTAR FASCIA RELEASE         History reviewed. No pertinent family history.    Social History     Social History   • Marital status:      Spouse name: N/A   • Number of children: N/A   • Years of education: N/A     Social History Main Topics   • Smoking status: Never Smoker   • Smokeless tobacco: None   • Alcohol use No   • Drug use: No   • Sexual activity: Defer     Other Topics Concern   • None     Social History Narrative           Objective   Physical Exam   Constitutional: He is oriented to person, place, and time. He appears well-developed and well-nourished. No distress.   HENT:   Head: Normocephalic and atraumatic.   Right Ear: External ear normal.   Left Ear: External ear normal.   Nose: Nose normal.   Mouth/Throat: Oropharynx is clear and moist. No oropharyngeal exudate.   Eyes: Conjunctivae and EOM are normal. Pupils are equal, round, and reactive to light. Right eye exhibits no discharge. Left eye exhibits no discharge. No scleral icterus.   Neck: Normal range of motion. Neck supple. No JVD present. No tracheal deviation present. No thyromegaly present.   Cardiovascular: Normal rate, regular rhythm, normal heart sounds and intact distal pulses.  Exam reveals no gallop and no friction rub.    No murmur heard.  Pulmonary/Chest: Effort normal and breath sounds normal. No stridor. No respiratory distress. He has no wheezes. He has no rales. He exhibits no tenderness.   Abdominal: Soft. He  exhibits no distension and no mass. There is no tenderness. There is no guarding.   Abdominal wall with several superficial apparently cystic lesions  With no surrounding erythema or induration, no fluctuance.   Musculoskeletal: Normal range of motion. He exhibits no edema, tenderness or deformity.   No focal tenderness, no SI tenderness.  Mild right thoracolumbar muscular tenderness, reproduces patient's pain and causes patient to recoil.  No CVA tenderness.   Neurological: He is alert and oriented to person, place, and time. He has normal reflexes. He displays normal reflexes. No cranial nerve deficit. He exhibits normal muscle tone. Coordination normal.   Neurologic exam grossly intact with no gross cranial nerve deficits.  No meningismus.  No gross sensorimotor deficits were noted cerebellar functions are grossly intact.   Skin: Skin is warm and dry. No rash noted. He is not diaphoretic. No erythema. No pallor.   Psychiatric: He has a normal mood and affect. His behavior is normal. Judgment and thought content normal.   Nursing note and vitals reviewed.      Procedures        Recent Results (from the past 24 hour(s))   Comprehensive Metabolic Panel    Collection Time: 10/13/17  3:26 PM   Result Value Ref Range    Glucose 137 (H) 74 - 98 mg/dL    BUN 10 7 - 20 mg/dL    Creatinine 0.90 0.60 - 1.30 mg/dL    Sodium 143 137 - 145 mmol/L    Potassium 3.9 3.5 - 5.1 mmol/L    Chloride 106 98 - 107 mmol/L    CO2 27.0 26.0 - 30.0 mmol/L    Calcium 9.4 8.4 - 10.2 mg/dL    Total Protein 7.1 6.3 - 8.2 g/dL    Albumin 4.10 3.50 - 5.00 g/dL    ALT (SGPT) 81 (H) 13 - 69 U/L    AST (SGOT) 43 15 - 46 U/L    Alkaline Phosphatase 81 38 - 126 U/L    Total Bilirubin 0.8 0.2 - 1.3 mg/dL    eGFR Non African Amer 90 >60 mL/min/1.73    Globulin 3.0 gm/dL    A/G Ratio 1.4 1.0 - 2.0 g/dL    BUN/Creatinine Ratio 11.1 6.3 - 21.9    Anion Gap 13.9 mmol/L   CBC Auto Differential    Collection Time: 10/13/17  3:27 PM   Result Value Ref Range     WBC 6.03 4.80 - 10.80 10*3/mm3    RBC 4.99 4.70 - 6.10 10*6/mm3    Hemoglobin 15.6 14.0 - 18.0 g/dL    Hematocrit 46.2 42.0 - 52.0 %    MCV 92.6 80.0 - 94.0 fL    MCH 31.3 (H) 27.0 - 31.0 pg    MCHC 33.8 30.0 - 37.0 g/dL    RDW 13.3 11.5 - 14.5 %    RDW-SD 45.2 37.0 - 54.0 fl    MPV 10.4 6.0 - 12.0 fL    Platelets 147 130 - 400 10*3/mm3    Neutrophil % 62.7 37.0 - 80.0 %    Lymphocyte % 29.7 10.0 - 50.0 %    Monocyte % 5.1 0.0 - 12.0 %    Eosinophil % 1.8 0.0 - 7.0 %    Basophil % 0.5 0.0 - 2.5 %    Immature Grans % 0.2 0.0 - 0.6 %    Neutrophils, Absolute 3.78 2.00 - 6.90 10*3/mm3    Lymphocytes, Absolute 1.79 0.60 - 3.40 10*3/mm3    Monocytes, Absolute 0.31 0.00 - 0.90 10*3/mm3    Eosinophils, Absolute 0.11 0.00 - 0.70 10*3/mm3    Basophils, Absolute 0.03 0.00 - 0.20 10*3/mm3    Immature Grans, Absolute 0.01 0.00 - 0.06 10*3/mm3    nRBC 0.0 0.0 - 0.0 /100 WBC   Urinalysis With / Culture If Indicated - Urine, Clean Catch    Collection Time: 10/13/17  4:12 PM   Result Value Ref Range    Color, UA Yellow Yellow, Straw    Appearance, UA Clear Clear    pH, UA 7.5 5.0 - 8.0    Specific Gravity, UA 1.020 1.005 - 1.030    Glucose, UA Negative Negative    Ketones, UA Negative Negative    Bilirubin, UA Negative Negative    Blood, UA Trace (A) Negative    Protein, UA Negative Negative    Leuk Esterase, UA Negative Negative    Nitrite, UA Negative Negative    Urobilinogen, UA 0.2 E.U./dL 0.2 - 1.0 E.U./dL   Urine Drug Screen - Urine, Clean Catch    Collection Time: 10/13/17  4:12 PM   Result Value Ref Range    THC, Screen, Urine Negative Negative    Phencyclidine (PCP), Urine Negative Negative    Cocaine Screen, Urine Negative Negative    Methamphetamine, Urine Negative Negative    Opiate Screen Negative Negative    Amphetamine Screen, Urine Negative Negative    Benzodiazepine Screen, Urine Negative Negative    Tricyclic Antidepressants Screen Negative Negative    Methadone Screen, Urine Negative Negative    Barbiturates  "Screen, Urine Negative Negative    Oxycodone Screen, Urine Negative Negative    Propoxyphene Screen Negative Negative    Buprenorphine, Screen, Urine Negative Negative   Urinalysis, Microscopic Only - Urine, Clean Catch    Collection Time: 10/13/17  4:12 PM   Result Value Ref Range    RBC, UA 0-2 (A) None Seen /HPF    WBC, UA 0-2 (A) None Seen /HPF    Bacteria, UA None Seen None Seen /HPF    Squamous Epithelial Cells, UA 0-2 None Seen, 0-2 /HPF    Hyaline Casts, UA None Seen None Seen /LPF    Methodology Manual Light Microscopy      Note: In addition to lab results from this visit, the labs listed above may include labs taken at another facility or during a different encounter within the last 24 hours. Please correlate lab times with ED admission and discharge times for further clarification of the services performed during this visit.    CT Head Without Contrast   Final Result   No acute intracranial process.                 1142.61 mGy.cm             This study was performed with techniques to keep radiation doses as low   as reasonably achievable (ALARA). Individualized dose reduction   techniques using automated exposure control or adjustment of mA and/or   kV according to the patient size were employed.        This report was finalized on 10/13/2017 2:54 PM by Chi Patel M.D..        Vitals:    10/13/17 1412 10/13/17 1621   BP: (!) 149/104 136/85   BP Location:  Right arm   Patient Position:  Sitting   Pulse: 76 56   Resp: 16 18   Temp: 97.7 °F (36.5 °C) 98.2 °F (36.8 °C)   TempSrc:  Oral   SpO2: 100% 97%   Weight: 300 lb (136 kg)    Height: 73\" (185.4 cm)      Medications   sodium chloride 0.9 % flush 10 mL (not administered)   sodium chloride 0.9 % infusion (125 mL/hr Intravenous New Bag 10/13/17 1531)   ketorolac (TORADOL) injection 10 mg (10 mg Intravenous Given 10/13/17 1533)   metoclopramide (REGLAN) injection 10 mg (10 mg Intravenous Given 10/13/17 1533)   diphenhydrAMINE (BENADRYL) injection " 25 mg (25 mg Intravenous Given 10/13/17 1532)   HYDROcodone-acetaminophen (NORCO) 5-325 MG per tablet 1 tablet (1 tablet Oral Given 10/13/17 3819)     ECG/EMG Results (last 24 hours)     ** No results found for the last 24 hours. **            ED Course  ED Course                  MDM    Final diagnoses:   Other headache syndrome   Nausea and vomiting, intractability of vomiting not specified, unspecified vomiting type   Acute exacerbation of chronic low back pain   Cutaneous cyst   Elevated blood pressure reading   History of MI (myocardial infarction)            Brina Jiang PA-C  10/13/17 0385

## 2017-10-13 NOTE — DISCHARGE INSTRUCTIONS
Rest, follow-up with  surgeon for recheck on Monday.  Return to emergency department immediately if any change or worsening of symptoms.

## 2017-11-29 ENCOUNTER — APPOINTMENT (OUTPATIENT)
Dept: CT IMAGING | Facility: HOSPITAL | Age: 47
End: 2017-11-29

## 2017-11-29 ENCOUNTER — HOSPITAL ENCOUNTER (EMERGENCY)
Facility: HOSPITAL | Age: 47
Discharge: HOME | End: 2017-11-30
Attending: EMERGENCY MEDICINE | Admitting: EMERGENCY MEDICINE

## 2017-11-29 ENCOUNTER — APPOINTMENT (OUTPATIENT)
Dept: ULTRASOUND IMAGING | Facility: HOSPITAL | Age: 47
End: 2017-11-29

## 2017-11-29 VITALS
RESPIRATION RATE: 20 BRPM | SYSTOLIC BLOOD PRESSURE: 146 MMHG | HEIGHT: 73 IN | OXYGEN SATURATION: 96 % | HEART RATE: 63 BPM | WEIGHT: 309 LBS | DIASTOLIC BLOOD PRESSURE: 93 MMHG | TEMPERATURE: 97.9 F | BODY MASS INDEX: 40.95 KG/M2

## 2017-11-29 DIAGNOSIS — R10.11 ABDOMINAL PAIN, RIGHT UPPER QUADRANT: Primary | ICD-10-CM

## 2017-11-29 LAB
ALBUMIN SERPL-MCNC: 4 G/DL (ref 3.5–5)
ALBUMIN/GLOB SERPL: 1.4 G/DL (ref 1–2)
ALP SERPL-CCNC: 81 U/L (ref 38–126)
ALT SERPL W P-5'-P-CCNC: 94 U/L (ref 13–69)
ANION GAP SERPL CALCULATED.3IONS-SCNC: 12.9 MMOL/L
AST SERPL-CCNC: 49 U/L (ref 15–46)
BASOPHILS # BLD AUTO: 0.04 10*3/MM3 (ref 0–0.2)
BASOPHILS NFR BLD AUTO: 0.6 % (ref 0–2.5)
BILIRUB SERPL-MCNC: 0.4 MG/DL (ref 0.2–1.3)
BILIRUB UR QL STRIP: NEGATIVE
BUN BLD-MCNC: 15 MG/DL (ref 7–20)
BUN/CREAT SERPL: 18.8 (ref 6.3–21.9)
CALCIUM SPEC-SCNC: 9 MG/DL (ref 8.4–10.2)
CHLORIDE SERPL-SCNC: 109 MMOL/L (ref 98–107)
CLARITY UR: CLEAR
CO2 SERPL-SCNC: 23 MMOL/L (ref 26–30)
COLOR UR: YELLOW
CREAT BLD-MCNC: 0.8 MG/DL (ref 0.6–1.3)
DEPRECATED RDW RBC AUTO: 43.9 FL (ref 37–54)
EOSINOPHIL # BLD AUTO: 0.12 10*3/MM3 (ref 0–0.7)
EOSINOPHIL NFR BLD AUTO: 1.8 % (ref 0–7)
ERYTHROCYTE [DISTWIDTH] IN BLOOD BY AUTOMATED COUNT: 13.1 % (ref 11.5–14.5)
GFR SERPL CREATININE-BSD FRML MDRD: 103 ML/MIN/1.73
GLOBULIN UR ELPH-MCNC: 2.9 GM/DL
GLUCOSE BLD-MCNC: 144 MG/DL (ref 74–98)
GLUCOSE UR STRIP-MCNC: ABNORMAL MG/DL
HCT VFR BLD AUTO: 45.7 % (ref 42–52)
HGB BLD-MCNC: 15.3 G/DL (ref 14–18)
HGB UR QL STRIP.AUTO: NEGATIVE
IMM GRANULOCYTES # BLD: 0.01 10*3/MM3 (ref 0–0.06)
IMM GRANULOCYTES NFR BLD: 0.2 % (ref 0–0.6)
KETONES UR QL STRIP: NEGATIVE
LEUKOCYTE ESTERASE UR QL STRIP.AUTO: NEGATIVE
LIPASE SERPL-CCNC: 50 U/L (ref 23–300)
LYMPHOCYTES # BLD AUTO: 2.17 10*3/MM3 (ref 0.6–3.4)
LYMPHOCYTES NFR BLD AUTO: 32.7 % (ref 10–50)
MCH RBC QN AUTO: 30.9 PG (ref 27–31)
MCHC RBC AUTO-ENTMCNC: 33.5 G/DL (ref 30–37)
MCV RBC AUTO: 92.3 FL (ref 80–94)
MONOCYTES # BLD AUTO: 0.4 10*3/MM3 (ref 0–0.9)
MONOCYTES NFR BLD AUTO: 6 % (ref 0–12)
NEUTROPHILS # BLD AUTO: 3.89 10*3/MM3 (ref 2–6.9)
NEUTROPHILS NFR BLD AUTO: 58.7 % (ref 37–80)
NITRITE UR QL STRIP: NEGATIVE
NRBC BLD MANUAL-RTO: 0 /100 WBC (ref 0–0)
PH UR STRIP.AUTO: 5.5 [PH] (ref 5–8)
PLATELET # BLD AUTO: 149 10*3/MM3 (ref 130–400)
PMV BLD AUTO: 11 FL (ref 6–12)
POTASSIUM BLD-SCNC: 3.9 MMOL/L (ref 3.5–5.1)
PROT SERPL-MCNC: 6.9 G/DL (ref 6.3–8.2)
PROT UR QL STRIP: NEGATIVE
RBC # BLD AUTO: 4.95 10*6/MM3 (ref 4.7–6.1)
SODIUM BLD-SCNC: 141 MMOL/L (ref 137–145)
SP GR UR STRIP: >=1.03 (ref 1–1.03)
UROBILINOGEN UR QL STRIP: ABNORMAL
WBC NRBC COR # BLD: 6.63 10*3/MM3 (ref 4.8–10.8)

## 2017-11-29 PROCEDURE — 0 IOPAMIDOL 61 % SOLUTION: Performed by: EMERGENCY MEDICINE

## 2017-11-29 PROCEDURE — 25010000002 KETOROLAC TROMETHAMINE PER 15 MG: Performed by: PHYSICIAN ASSISTANT

## 2017-11-29 PROCEDURE — 25010000002 ONDANSETRON PER 1 MG: Performed by: PHYSICIAN ASSISTANT

## 2017-11-29 PROCEDURE — 76705 ECHO EXAM OF ABDOMEN: CPT

## 2017-11-29 PROCEDURE — 85025 COMPLETE CBC W/AUTO DIFF WBC: CPT | Performed by: PHYSICIAN ASSISTANT

## 2017-11-29 PROCEDURE — 96361 HYDRATE IV INFUSION ADD-ON: CPT

## 2017-11-29 PROCEDURE — 96375 TX/PRO/DX INJ NEW DRUG ADDON: CPT

## 2017-11-29 PROCEDURE — 74177 CT ABD & PELVIS W/CONTRAST: CPT

## 2017-11-29 PROCEDURE — 81003 URINALYSIS AUTO W/O SCOPE: CPT | Performed by: PHYSICIAN ASSISTANT

## 2017-11-29 PROCEDURE — 83690 ASSAY OF LIPASE: CPT | Performed by: PHYSICIAN ASSISTANT

## 2017-11-29 PROCEDURE — 99283 EMERGENCY DEPT VISIT LOW MDM: CPT

## 2017-11-29 PROCEDURE — 96374 THER/PROPH/DIAG INJ IV PUSH: CPT

## 2017-11-29 PROCEDURE — 80053 COMPREHEN METABOLIC PANEL: CPT | Performed by: PHYSICIAN ASSISTANT

## 2017-11-29 RX ORDER — SODIUM CHLORIDE 0.9 % (FLUSH) 0.9 %
10 SYRINGE (ML) INJECTION AS NEEDED
Status: DISCONTINUED | OUTPATIENT
Start: 2017-11-29 | End: 2017-11-30 | Stop reason: HOSPADM

## 2017-11-29 RX ORDER — ONDANSETRON 2 MG/ML
4 INJECTION INTRAMUSCULAR; INTRAVENOUS ONCE
Status: COMPLETED | OUTPATIENT
Start: 2017-11-29 | End: 2017-11-29

## 2017-11-29 RX ORDER — ONDANSETRON 4 MG/1
4 TABLET, ORALLY DISINTEGRATING ORAL EVERY 6 HOURS PRN
Qty: 12 TABLET | Refills: 0 | Status: SHIPPED | OUTPATIENT
Start: 2017-11-29 | End: 2021-07-07 | Stop reason: HOSPADM

## 2017-11-29 RX ORDER — KETOROLAC TROMETHAMINE 30 MG/ML
30 INJECTION, SOLUTION INTRAMUSCULAR; INTRAVENOUS ONCE
Status: COMPLETED | OUTPATIENT
Start: 2017-11-29 | End: 2017-11-29

## 2017-11-29 RX ORDER — ALUMINA, MAGNESIA, AND SIMETHICONE 2400; 2400; 240 MG/30ML; MG/30ML; MG/30ML
15 SUSPENSION ORAL ONCE
Status: COMPLETED | OUTPATIENT
Start: 2017-11-29 | End: 2017-11-29

## 2017-11-29 RX ORDER — OMEPRAZOLE 20 MG/1
20 CAPSULE, DELAYED RELEASE ORAL DAILY
Qty: 30 CAPSULE | Refills: 0 | Status: SHIPPED | OUTPATIENT
Start: 2017-11-29 | End: 2021-07-07 | Stop reason: HOSPADM

## 2017-11-29 RX ORDER — PANTOPRAZOLE SODIUM 40 MG/1
40 TABLET, DELAYED RELEASE ORAL ONCE
Status: COMPLETED | OUTPATIENT
Start: 2017-11-29 | End: 2017-11-29

## 2017-11-29 RX ADMIN — LIDOCAINE HYDROCHLORIDE 15 ML: 20 SOLUTION ORAL; TOPICAL at 23:54

## 2017-11-29 RX ADMIN — ALUMINUM HYDROXIDE, MAGNESIUM HYDROXIDE, AND DIMETHICONE 15 ML: 400; 400; 40 SUSPENSION ORAL at 23:53

## 2017-11-29 RX ADMIN — KETOROLAC TROMETHAMINE 30 MG: 30 INJECTION, SOLUTION INTRAMUSCULAR at 21:06

## 2017-11-29 RX ADMIN — PANTOPRAZOLE SODIUM 40 MG: 40 TABLET, DELAYED RELEASE ORAL at 23:52

## 2017-11-29 RX ADMIN — ONDANSETRON 4 MG: 2 INJECTION INTRAMUSCULAR; INTRAVENOUS at 21:05

## 2017-11-29 RX ADMIN — SODIUM CHLORIDE 1000 ML: 9 INJECTION, SOLUTION INTRAVENOUS at 21:02

## 2017-11-29 RX ADMIN — IOPAMIDOL 100 ML: 612 INJECTION, SOLUTION INTRAVENOUS at 22:49

## 2017-12-03 ENCOUNTER — HOSPITAL ENCOUNTER (EMERGENCY)
Facility: HOSPITAL | Age: 47
Discharge: HOME | End: 2017-12-03
Attending: STUDENT IN AN ORGANIZED HEALTH CARE EDUCATION/TRAINING PROGRAM | Admitting: STUDENT IN AN ORGANIZED HEALTH CARE EDUCATION/TRAINING PROGRAM

## 2017-12-03 VITALS
OXYGEN SATURATION: 97 % | BODY MASS INDEX: 40.95 KG/M2 | TEMPERATURE: 98 F | SYSTOLIC BLOOD PRESSURE: 141 MMHG | DIASTOLIC BLOOD PRESSURE: 81 MMHG | WEIGHT: 309 LBS | HEART RATE: 80 BPM | RESPIRATION RATE: 18 BRPM | HEIGHT: 73 IN

## 2017-12-03 DIAGNOSIS — R10.11 RUQ ABDOMINAL PAIN: Primary | ICD-10-CM

## 2017-12-03 PROCEDURE — 99282 EMERGENCY DEPT VISIT SF MDM: CPT

## 2017-12-03 RX ORDER — TRAMADOL HYDROCHLORIDE 50 MG/1
50 TABLET ORAL EVERY 6 HOURS PRN
Qty: 12 TABLET | Refills: 0 | Status: SHIPPED | OUTPATIENT
Start: 2017-12-03 | End: 2021-07-07 | Stop reason: HOSPADM

## 2017-12-03 NOTE — ED PROVIDER NOTES
Subjective   HPI Comments: 47-year-old male presents to emergency department with right upper abdominal pain, he was emergency department or days ago had a CT scan ultrasound and labs was discharged on Prilosec Zofran, he states that he is still having right upper quadrant abdominal pain that is worse with eating, he attempted to eat chicken Celestino today and became nauseated diarrhea and right upper quadrant pain.  He states that he has a decreased appetite.  On a CT scan he had fatty liver but no stones in his gallbladder.  He states the symptoms are in the right upper quadrant radiating to his back and are worsened by eating certain foods.      History provided by:  Patient   used: No        Review of Systems   Gastrointestinal: Positive for abdominal pain, diarrhea, nausea and vomiting.   All other systems reviewed and are negative.      Past Medical History:   Diagnosis Date   • Asthma    • CKD (chronic kidney disease)    • Diabetes mellitus    • Hypertension    • Kidney stone    • Myocardial infarction    • Pilonidal cyst    • Plantar fasciitis    • Renal disorder        Allergies   Allergen Reactions   • Morphine And Related Anaphylaxis   • Penicillins Hives   • Pradaxa [Dabigatran Etexilate Mesylate] Hives       Past Surgical History:   Procedure Laterality Date   • APPENDECTOMY     • CARDIAC CATHETERIZATION     • COLONOSCOPY     • CORONARY ANGIOPLASTY     • PLANTAR FASCIA RELEASE         History reviewed. No pertinent family history.    Social History     Social History   • Marital status:      Spouse name: N/A   • Number of children: N/A   • Years of education: N/A     Social History Main Topics   • Smoking status: Never Smoker   • Smokeless tobacco: Never Used   • Alcohol use No   • Drug use: No   • Sexual activity: Defer     Other Topics Concern   • None     Social History Narrative           Objective   Physical Exam   Constitutional: He is oriented to person, place, and time.  He appears well-developed and well-nourished.   HENT:   Head: Atraumatic.   Eyes: EOM are normal. Pupils are equal, round, and reactive to light.   Neck: Normal range of motion.   Cardiovascular: Normal rate and regular rhythm.    Pulmonary/Chest: Effort normal and breath sounds normal.   Abdominal: Soft. Bowel sounds are normal.   Musculoskeletal: Normal range of motion.   Neurological: He is alert and oriented to person, place, and time.   Skin: Skin is warm and dry.   Psychiatric: He has a normal mood and affect. His behavior is normal. Judgment and thought content normal.   Nursing note and vitals reviewed.      Procedures         ED Course  ED Course                  MDM    Final diagnoses:   RUQ abdominal pain            Sunny Jorge Jr., PA-C  12/03/17 7963

## 2018-01-05 ENCOUNTER — HOSPITAL ENCOUNTER (EMERGENCY)
Facility: HOSPITAL | Age: 48
Discharge: HOME | End: 2018-01-05
Attending: EMERGENCY MEDICINE | Admitting: EMERGENCY MEDICINE

## 2018-01-05 VITALS
HEIGHT: 73 IN | HEART RATE: 70 BPM | OXYGEN SATURATION: 97 % | SYSTOLIC BLOOD PRESSURE: 153 MMHG | RESPIRATION RATE: 18 BRPM | BODY MASS INDEX: 41.48 KG/M2 | WEIGHT: 313 LBS | TEMPERATURE: 97.5 F | DIASTOLIC BLOOD PRESSURE: 102 MMHG

## 2018-01-05 DIAGNOSIS — M54.30 BACK PAIN WITH SCIATICA: Primary | ICD-10-CM

## 2018-01-05 DIAGNOSIS — M54.9 BACK PAIN WITH SCIATICA: Primary | ICD-10-CM

## 2018-01-05 PROCEDURE — 25010000002 METHYLPREDNISOLONE PER 125 MG: Performed by: PHYSICIAN ASSISTANT

## 2018-01-05 PROCEDURE — 25010000002 ORPHENADRINE CITRATE PER 60 MG: Performed by: PHYSICIAN ASSISTANT

## 2018-01-05 PROCEDURE — 25010000002 KETOROLAC TROMETHAMINE PER 15 MG: Performed by: PHYSICIAN ASSISTANT

## 2018-01-05 PROCEDURE — 96372 THER/PROPH/DIAG INJ SC/IM: CPT

## 2018-01-05 PROCEDURE — 99283 EMERGENCY DEPT VISIT LOW MDM: CPT

## 2018-01-05 RX ORDER — METHYLPREDNISOLONE SODIUM SUCCINATE 125 MG/2ML
125 INJECTION, POWDER, LYOPHILIZED, FOR SOLUTION INTRAMUSCULAR; INTRAVENOUS ONCE
Status: COMPLETED | OUTPATIENT
Start: 2018-01-05 | End: 2018-01-05

## 2018-01-05 RX ORDER — NAPROXEN 500 MG/1
500 TABLET ORAL 2 TIMES DAILY PRN
Qty: 14 TABLET | Refills: 0 | OUTPATIENT
Start: 2018-01-05 | End: 2020-10-05

## 2018-01-05 RX ORDER — CYCLOBENZAPRINE HCL 5 MG
5 TABLET ORAL 3 TIMES DAILY PRN
Qty: 15 TABLET | Refills: 0 | Status: SHIPPED | OUTPATIENT
Start: 2018-01-05 | End: 2021-07-06

## 2018-01-05 RX ORDER — ORPHENADRINE CITRATE 30 MG/ML
60 INJECTION INTRAMUSCULAR; INTRAVENOUS ONCE
Status: COMPLETED | OUTPATIENT
Start: 2018-01-05 | End: 2018-01-05

## 2018-01-05 RX ORDER — KETOROLAC TROMETHAMINE 30 MG/ML
30 INJECTION, SOLUTION INTRAMUSCULAR; INTRAVENOUS ONCE
Status: COMPLETED | OUTPATIENT
Start: 2018-01-05 | End: 2018-01-05

## 2018-01-05 RX ORDER — HYDROCODONE BITARTRATE AND ACETAMINOPHEN 7.5; 325 MG/1; MG/1
1 TABLET ORAL ONCE
Status: COMPLETED | OUTPATIENT
Start: 2018-01-05 | End: 2018-01-05

## 2018-01-05 RX ADMIN — KETOROLAC TROMETHAMINE 30 MG: 30 INJECTION, SOLUTION INTRAMUSCULAR at 22:21

## 2018-01-05 RX ADMIN — HYDROCODONE BITARTRATE AND ACETAMINOPHEN 1 TABLET: 7.5; 325 TABLET ORAL at 23:17

## 2018-01-05 RX ADMIN — ORPHENADRINE CITRATE 60 MG: 30 INJECTION INTRAMUSCULAR; INTRAVENOUS at 22:22

## 2018-01-05 RX ADMIN — METHYLPREDNISOLONE SODIUM SUCCINATE 125 MG: 125 INJECTION, POWDER, FOR SOLUTION INTRAMUSCULAR; INTRAVENOUS at 22:21

## 2018-01-06 NOTE — ED PROVIDER NOTES
Subjective   HPI Comments: This is a 47-year-old male that presents with chief complaint acute on chronic lumbar back pain times one day.  Patient states he sustained a twisting injury yesterday and felt something pop in his lower back.  Describes the pain as aching, sharp, spasms that radiate to his bilateral hips.  Patient does report that he has had chronic back pain.  Patient does have a history of neuropathy to both feet.  Does have history of hypertension, diabetes.  Denies any saddle anesthesia or fecal or urinary incontinence.        Patient is a 47 y.o. male presenting with back pain.   History provided by:  Patient   used: No    Back Pain   Location:  Lumbar spine  Quality:  Aching, stabbing and stiffness  Radiates to: left and right hip.  Pain severity:  Moderate  Onset quality:  Sudden  Duration:  2 days  Timing:  Constant  Progression:  Worsening  Chronicity:  New  Context: recent injury and twisting    Context: not emotional stress and not falling    Relieved by:  Nothing  Worsened by:  Nothing  Ineffective treatments:  None tried  Associated symptoms: weakness    Associated symptoms: no abdominal pain, no abdominal swelling, no fever, no headaches, no numbness, no paresthesias, no pelvic pain and no perianal numbness    Risk factors: no hx of cancer, no hx of osteoporosis, no menopause, not obese and no steroid use        Review of Systems   Constitutional: Negative for fever.   Gastrointestinal: Negative for abdominal pain.   Genitourinary: Negative for pelvic pain.   Musculoskeletal: Positive for back pain.   Neurological: Positive for weakness. Negative for numbness, headaches and paresthesias.   All other systems reviewed and are negative.      Past Medical History:   Diagnosis Date   • Asthma    • Back pain    • CKD (chronic kidney disease)    • Diabetes mellitus    • Hypertension    • Kidney stone    • Myocardial infarction    • Pilonidal cyst    • Plantar fasciitis    •  Renal disorder        Allergies   Allergen Reactions   • Morphine And Related Anaphylaxis   • Penicillins Hives   • Pradaxa [Dabigatran Etexilate Mesylate] Hives       Past Surgical History:   Procedure Laterality Date   • APPENDECTOMY     • CARDIAC CATHETERIZATION     • COLONOSCOPY     • CORONARY ANGIOPLASTY     • PLANTAR FASCIA RELEASE         History reviewed. No pertinent family history.    Social History     Social History   • Marital status:      Spouse name: N/A   • Number of children: N/A   • Years of education: N/A     Social History Main Topics   • Smoking status: Never Smoker   • Smokeless tobacco: Never Used   • Alcohol use No   • Drug use: No   • Sexual activity: Defer     Other Topics Concern   • None     Social History Narrative   • None           Objective   Physical Exam   Constitutional: He is oriented to person, place, and time. He appears well-developed and well-nourished.   HENT:   Head: Normocephalic and atraumatic.   Right Ear: External ear normal.   Left Ear: External ear normal.   Nose: Nose normal.   Mouth/Throat: Oropharynx is clear and moist.   Eyes: Conjunctivae and EOM are normal. Pupils are equal, round, and reactive to light.   Neck: Normal range of motion. Neck supple.   Cardiovascular: Normal rate, regular rhythm, normal heart sounds and intact distal pulses.    Pulmonary/Chest: Effort normal and breath sounds normal.   Abdominal: Soft. Bowel sounds are normal.   Musculoskeletal: He exhibits tenderness. He exhibits no edema or deformity.        Lumbar back: He exhibits decreased range of motion, tenderness, swelling, pain and spasm. He exhibits no deformity.   Neurological: He is alert and oriented to person, place, and time. He has normal reflexes.   Skin: Skin is warm and dry.   Psychiatric: He has a normal mood and affect. His behavior is normal. Judgment and thought content normal.   Nursing note and vitals reviewed.      Procedures         ED Course  ED Course                   MDM  Number of Diagnoses or Management Options  Back pain with sciatica: new and requires workup  Risk of Complications, Morbidity, and/or Mortality  Presenting problems: moderate  Diagnostic procedures: moderate  Management options: moderate    Patient Progress  Patient progress: stable      Final diagnoses:   Back pain with sciatica            Raza Tang PA-C  01/05/18 1583

## 2019-01-03 ENCOUNTER — HOSPITAL ENCOUNTER (EMERGENCY)
Facility: HOSPITAL | Age: 49
Discharge: HOME | End: 2019-01-03
Attending: EMERGENCY MEDICINE | Admitting: EMERGENCY MEDICINE

## 2019-01-03 VITALS
OXYGEN SATURATION: 98 % | WEIGHT: 302.6 LBS | HEIGHT: 73 IN | TEMPERATURE: 97.8 F | SYSTOLIC BLOOD PRESSURE: 174 MMHG | BODY MASS INDEX: 40.11 KG/M2 | RESPIRATION RATE: 16 BRPM | HEART RATE: 75 BPM | DIASTOLIC BLOOD PRESSURE: 103 MMHG

## 2019-01-03 DIAGNOSIS — R51.9 HEADACHE, UNSPECIFIED HEADACHE TYPE: Primary | ICD-10-CM

## 2019-01-03 PROCEDURE — 96375 TX/PRO/DX INJ NEW DRUG ADDON: CPT

## 2019-01-03 PROCEDURE — 63710000001 DIPHENHYDRAMINE PER 50 MG: Performed by: EMERGENCY MEDICINE

## 2019-01-03 PROCEDURE — 25010000002 KETOROLAC TROMETHAMINE PER 15 MG: Performed by: EMERGENCY MEDICINE

## 2019-01-03 PROCEDURE — 99283 EMERGENCY DEPT VISIT LOW MDM: CPT

## 2019-01-03 PROCEDURE — 96374 THER/PROPH/DIAG INJ IV PUSH: CPT

## 2019-01-03 PROCEDURE — 25010000002 PROCHLORPERAZINE EDISYLATE PER 10 MG: Performed by: EMERGENCY MEDICINE

## 2019-01-03 RX ORDER — ACETAMINOPHEN 325 MG/1
650 TABLET ORAL ONCE
Status: COMPLETED | OUTPATIENT
Start: 2019-01-03 | End: 2019-01-03

## 2019-01-03 RX ORDER — SODIUM CHLORIDE 0.9 % (FLUSH) 0.9 %
10 SYRINGE (ML) INJECTION AS NEEDED
Status: DISCONTINUED | OUTPATIENT
Start: 2019-01-03 | End: 2019-01-03 | Stop reason: HOSPADM

## 2019-01-03 RX ORDER — DIPHENHYDRAMINE HCL 25 MG
50 CAPSULE ORAL ONCE
Status: COMPLETED | OUTPATIENT
Start: 2019-01-03 | End: 2019-01-03

## 2019-01-03 RX ORDER — KETOROLAC TROMETHAMINE 30 MG/ML
15 INJECTION, SOLUTION INTRAMUSCULAR; INTRAVENOUS ONCE
Status: COMPLETED | OUTPATIENT
Start: 2019-01-03 | End: 2019-01-03

## 2019-01-03 RX ORDER — BUTALBITAL, ACETAMINOPHEN AND CAFFEINE 50; 325; 40 MG/1; MG/1; MG/1
1 TABLET ORAL ONCE
Status: COMPLETED | OUTPATIENT
Start: 2019-01-03 | End: 2019-01-03

## 2019-01-03 RX ADMIN — DIPHENHYDRAMINE HYDROCHLORIDE 50 MG: 25 CAPSULE ORAL at 18:06

## 2019-01-03 RX ADMIN — ACETAMINOPHEN 650 MG: 325 TABLET, FILM COATED ORAL at 18:06

## 2019-01-03 RX ADMIN — PROCHLORPERAZINE EDISYLATE 10 MG: 5 INJECTION INTRAMUSCULAR; INTRAVENOUS at 18:06

## 2019-01-03 RX ADMIN — KETOROLAC TROMETHAMINE 15 MG: 30 INJECTION, SOLUTION INTRAMUSCULAR at 18:09

## 2019-01-03 RX ADMIN — BUTALBITAL, ACETAMINOPHEN AND CAFFEINE 1 TABLET: 50; 325; 40 TABLET ORAL at 18:06

## 2019-01-03 RX ADMIN — SODIUM CHLORIDE 500 ML: 9 INJECTION, SOLUTION INTRAVENOUS at 18:05

## 2019-01-03 NOTE — ED PROVIDER NOTES
Subjective   History of Present Illness   48-year-old male with a history of migraines presenting with migraine headache.  Describes 24 hours of dull pain started behind his eyes and radiated behind his head.  Has associated nausea and photophobia.  States this feels like similar migraines in the past.  Has not been relieved by Excedrin or Tylenol at home. Denies any sudden onset of headache, neck pain/stiffness, easy bruising/bleeding, vomiting, changes in vision, weakness or numbness in arms or legs or use of blood thinners.      Review of Systems   Neurological: Positive for headaches.   All other systems reviewed and are negative.      Past Medical History:   Diagnosis Date   • Asthma    • Back pain    • CKD (chronic kidney disease)    • Diabetes mellitus (CMS/HCC)    • Hypertension    • Kidney stone    • Myocardial infarction (CMS/HCC)    • Pilonidal cyst    • Plantar fasciitis    • Renal disorder        Allergies   Allergen Reactions   • Morphine And Related Anaphylaxis   • Penicillins Hives   • Pradaxa [Dabigatran Etexilate Mesylate] Hives       Past Surgical History:   Procedure Laterality Date   • APPENDECTOMY     • CARDIAC CATHETERIZATION     • COLONOSCOPY     • CORONARY ANGIOPLASTY     • PLANTAR FASCIA RELEASE         History reviewed. No pertinent family history.    Social History     Socioeconomic History   • Marital status:      Spouse name: Not on file   • Number of children: Not on file   • Years of education: Not on file   • Highest education level: Not on file   Tobacco Use   • Smoking status: Never Smoker   • Smokeless tobacco: Never Used   Substance and Sexual Activity   • Alcohol use: No   • Drug use: No   • Sexual activity: Defer           Objective   Physical Exam   Constitutional: He is oriented to person, place, and time. He appears well-developed and well-nourished. No distress.   HENT:   Head: Normocephalic and atraumatic.   Mouth/Throat: Oropharynx is clear and moist.   Eyes: EOM  are normal. No scleral icterus.   Neck: Normal range of motion. Neck supple. No tracheal deviation present.   Cardiovascular: Normal rate, regular rhythm, normal heart sounds and intact distal pulses. Exam reveals no gallop and no friction rub.   No murmur heard.  Pulmonary/Chest: Effort normal and breath sounds normal. No stridor. No respiratory distress. He has no wheezes. He has no rales. He exhibits no tenderness.   Abdominal: Soft. He exhibits no distension and no mass. There is no tenderness. There is no rebound and no guarding.   Musculoskeletal: Normal range of motion. He exhibits no deformity.   Neurological: He is alert and oriented to person, place, and time.   Strength and sensation intact to BUE / BLE   Skin: Skin is warm and dry. No rash noted. He is not diaphoretic. No erythema. No pallor.   Psychiatric: He has a normal mood and affect. His behavior is normal.   Nursing note and vitals reviewed.      Procedures           ED Course                  MDM   Pt presented with headache without any red flag signs or symptoms suggestive of SAH, SDH, EDH, meningitis/encephalitis, temporal arteritis, acute angle closure glaucoma, venous thrombosis, carbon monoxide poisoning, or idiopathic intracranial hypertension. Plan for symptomatic treatment and reassessment.    6:51 PM Pt feels improved and asking to go home. Discussed return to care precautions.     Final diagnoses:   Headache, unspecified headache type            Kamar Bhatti MD  01/03/19 0632

## 2020-09-29 ENCOUNTER — TRANSCRIBE ORDERS (OUTPATIENT)
Dept: ADMINISTRATIVE | Facility: HOSPITAL | Age: 50
End: 2020-09-29

## 2020-09-29 DIAGNOSIS — R79.89 ELEVATED LFTS: Primary | ICD-10-CM

## 2020-10-02 ENCOUNTER — APPOINTMENT (OUTPATIENT)
Dept: CT IMAGING | Facility: HOSPITAL | Age: 50
End: 2020-10-02

## 2020-10-02 ENCOUNTER — HOSPITAL ENCOUNTER (EMERGENCY)
Facility: HOSPITAL | Age: 50
Discharge: HOME | End: 2020-10-02
Attending: EMERGENCY MEDICINE | Admitting: EMERGENCY MEDICINE

## 2020-10-02 VITALS
DIASTOLIC BLOOD PRESSURE: 70 MMHG | WEIGHT: 282.8 LBS | HEART RATE: 59 BPM | RESPIRATION RATE: 16 BRPM | BODY MASS INDEX: 37.48 KG/M2 | SYSTOLIC BLOOD PRESSURE: 120 MMHG | OXYGEN SATURATION: 98 % | HEIGHT: 73 IN | TEMPERATURE: 98.3 F

## 2020-10-02 DIAGNOSIS — R11.2 NON-INTRACTABLE VOMITING WITH NAUSEA, UNSPECIFIED VOMITING TYPE: ICD-10-CM

## 2020-10-02 DIAGNOSIS — N12 PYELONEPHRITIS: Primary | ICD-10-CM

## 2020-10-02 LAB
ALBUMIN SERPL-MCNC: 3.6 G/DL (ref 3.5–5.2)
ALBUMIN/GLOB SERPL: 1.1 G/DL
ALP SERPL-CCNC: 94 U/L (ref 39–117)
ALT SERPL W P-5'-P-CCNC: 98 U/L (ref 1–41)
ANION GAP SERPL CALCULATED.3IONS-SCNC: 10.1 MMOL/L (ref 5–15)
AST SERPL-CCNC: 50 U/L (ref 1–40)
BACTERIA UR QL AUTO: ABNORMAL /HPF
BASOPHILS # BLD AUTO: 0.05 10*3/MM3 (ref 0–0.2)
BASOPHILS NFR BLD AUTO: 0.7 % (ref 0–1.5)
BILIRUB SERPL-MCNC: 1 MG/DL (ref 0–1.2)
BILIRUB UR QL STRIP: NEGATIVE
BUN SERPL-MCNC: 17 MG/DL (ref 6–20)
BUN/CREAT SERPL: 17 (ref 7–25)
CALCIUM SPEC-SCNC: 9.1 MG/DL (ref 8.6–10.5)
CHLORIDE SERPL-SCNC: 101 MMOL/L (ref 98–107)
CLARITY UR: CLEAR
CO2 SERPL-SCNC: 25.9 MMOL/L (ref 22–29)
COLOR UR: YELLOW
CREAT SERPL-MCNC: 1 MG/DL (ref 0.76–1.27)
DEPRECATED RDW RBC AUTO: 43.8 FL (ref 37–54)
EOSINOPHIL # BLD AUTO: 0.1 10*3/MM3 (ref 0–0.4)
EOSINOPHIL NFR BLD AUTO: 1.4 % (ref 0.3–6.2)
ERYTHROCYTE [DISTWIDTH] IN BLOOD BY AUTOMATED COUNT: 13.1 % (ref 12.3–15.4)
GFR SERPL CREATININE-BSD FRML MDRD: 78 ML/MIN/1.73
GLOBULIN UR ELPH-MCNC: 3.3 GM/DL
GLUCOSE SERPL-MCNC: 147 MG/DL (ref 65–99)
GLUCOSE UR STRIP-MCNC: ABNORMAL MG/DL
HCT VFR BLD AUTO: 46.8 % (ref 37.5–51)
HGB BLD-MCNC: 16.3 G/DL (ref 13–17.7)
HGB UR QL STRIP.AUTO: ABNORMAL
HYALINE CASTS UR QL AUTO: ABNORMAL /LPF
IMM GRANULOCYTES # BLD AUTO: 0.01 10*3/MM3 (ref 0–0.05)
IMM GRANULOCYTES NFR BLD AUTO: 0.1 % (ref 0–0.5)
KETONES UR QL STRIP: ABNORMAL
LEUKOCYTE ESTERASE UR QL STRIP.AUTO: NEGATIVE
LIPASE SERPL-CCNC: 20 U/L (ref 13–60)
LYMPHOCYTES # BLD AUTO: 2.22 10*3/MM3 (ref 0.7–3.1)
LYMPHOCYTES NFR BLD AUTO: 30 % (ref 19.6–45.3)
MCH RBC QN AUTO: 31.7 PG (ref 26.6–33)
MCHC RBC AUTO-ENTMCNC: 34.8 G/DL (ref 31.5–35.7)
MCV RBC AUTO: 90.9 FL (ref 79–97)
MONOCYTES # BLD AUTO: 0.46 10*3/MM3 (ref 0.1–0.9)
MONOCYTES NFR BLD AUTO: 6.2 % (ref 5–12)
MUCOUS THREADS URNS QL MICRO: ABNORMAL /HPF
NEUTROPHILS NFR BLD AUTO: 4.55 10*3/MM3 (ref 1.7–7)
NEUTROPHILS NFR BLD AUTO: 61.6 % (ref 42.7–76)
NITRITE UR QL STRIP: NEGATIVE
NRBC BLD AUTO-RTO: 0 /100 WBC (ref 0–0.2)
PH UR STRIP.AUTO: 8 [PH] (ref 5–8)
PLATELET # BLD AUTO: 147 10*3/MM3 (ref 140–450)
PMV BLD AUTO: 10.6 FL (ref 6–12)
POTASSIUM SERPL-SCNC: 3.6 MMOL/L (ref 3.5–5.2)
PROT SERPL-MCNC: 6.9 G/DL (ref 6–8.5)
PROT UR QL STRIP: ABNORMAL
RBC # BLD AUTO: 5.15 10*6/MM3 (ref 4.14–5.8)
RBC # UR: ABNORMAL /HPF
REF LAB TEST METHOD: ABNORMAL
SODIUM SERPL-SCNC: 137 MMOL/L (ref 136–145)
SP GR UR STRIP: 1.02 (ref 1–1.03)
SQUAMOUS #/AREA URNS HPF: ABNORMAL /HPF
UROBILINOGEN UR QL STRIP: ABNORMAL
WBC # BLD AUTO: 7.39 10*3/MM3 (ref 3.4–10.8)
WBC UR QL AUTO: ABNORMAL /HPF

## 2020-10-02 PROCEDURE — 85025 COMPLETE CBC W/AUTO DIFF WBC: CPT | Performed by: PHYSICIAN ASSISTANT

## 2020-10-02 PROCEDURE — 25010000002 ONDANSETRON PER 1 MG: Performed by: PHYSICIAN ASSISTANT

## 2020-10-02 PROCEDURE — 96365 THER/PROPH/DIAG IV INF INIT: CPT

## 2020-10-02 PROCEDURE — 99283 EMERGENCY DEPT VISIT LOW MDM: CPT

## 2020-10-02 PROCEDURE — 87086 URINE CULTURE/COLONY COUNT: CPT | Performed by: PHYSICIAN ASSISTANT

## 2020-10-02 PROCEDURE — 80053 COMPREHEN METABOLIC PANEL: CPT | Performed by: PHYSICIAN ASSISTANT

## 2020-10-02 PROCEDURE — 25010000002 IOPAMIDOL 61 % SOLUTION: Performed by: EMERGENCY MEDICINE

## 2020-10-02 PROCEDURE — 25010000002 KETOROLAC TROMETHAMINE PER 15 MG: Performed by: PHYSICIAN ASSISTANT

## 2020-10-02 PROCEDURE — 74177 CT ABD & PELVIS W/CONTRAST: CPT

## 2020-10-02 PROCEDURE — 96375 TX/PRO/DX INJ NEW DRUG ADDON: CPT

## 2020-10-02 PROCEDURE — 25010000002 CEFTRIAXONE SODIUM-DEXTROSE 1-3.74 GM-%(50ML) RECONSTITUTED SOLUTION: Performed by: PHYSICIAN ASSISTANT

## 2020-10-02 PROCEDURE — 81001 URINALYSIS AUTO W/SCOPE: CPT | Performed by: PHYSICIAN ASSISTANT

## 2020-10-02 PROCEDURE — 83690 ASSAY OF LIPASE: CPT | Performed by: PHYSICIAN ASSISTANT

## 2020-10-02 RX ORDER — KETOROLAC TROMETHAMINE 30 MG/ML
30 INJECTION, SOLUTION INTRAMUSCULAR; INTRAVENOUS EVERY 6 HOURS PRN
Status: DISCONTINUED | OUTPATIENT
Start: 2020-10-02 | End: 2020-10-02 | Stop reason: HOSPADM

## 2020-10-02 RX ORDER — CEFUROXIME AXETIL 500 MG/1
500 TABLET ORAL 2 TIMES DAILY
Qty: 20 TABLET | Refills: 0 | Status: SHIPPED | OUTPATIENT
Start: 2020-10-02 | End: 2020-10-12

## 2020-10-02 RX ORDER — CEFTRIAXONE 1 G/50ML
1 INJECTION, SOLUTION INTRAVENOUS ONCE
Status: COMPLETED | OUTPATIENT
Start: 2020-10-02 | End: 2020-10-02

## 2020-10-02 RX ORDER — SODIUM CHLORIDE 0.9 % (FLUSH) 0.9 %
10 SYRINGE (ML) INJECTION AS NEEDED
Status: DISCONTINUED | OUTPATIENT
Start: 2020-10-02 | End: 2020-10-02 | Stop reason: HOSPADM

## 2020-10-02 RX ORDER — CARVEDILOL 6.25 MG/1
6.25 TABLET ORAL 2 TIMES DAILY WITH MEALS
COMMUNITY

## 2020-10-02 RX ORDER — NITROGLYCERIN 0.4 MG/1
0.4 TABLET SUBLINGUAL
COMMUNITY
End: 2021-07-07 | Stop reason: HOSPADM

## 2020-10-02 RX ORDER — GLYBURIDE 5 MG/1
10 TABLET ORAL 2 TIMES DAILY WITH MEALS
COMMUNITY

## 2020-10-02 RX ORDER — ONDANSETRON 2 MG/ML
4 INJECTION INTRAMUSCULAR; INTRAVENOUS ONCE
Status: COMPLETED | OUTPATIENT
Start: 2020-10-02 | End: 2020-10-02

## 2020-10-02 RX ORDER — ONDANSETRON 4 MG/1
4 TABLET, ORALLY DISINTEGRATING ORAL EVERY 6 HOURS PRN
Qty: 8 TABLET | Refills: 0 | OUTPATIENT
Start: 2020-10-02 | End: 2020-10-05

## 2020-10-02 RX ADMIN — SODIUM CHLORIDE 1000 ML: 9 INJECTION, SOLUTION INTRAVENOUS at 16:56

## 2020-10-02 RX ADMIN — IOPAMIDOL 100 ML: 612 INJECTION, SOLUTION INTRAVENOUS at 17:53

## 2020-10-02 RX ADMIN — KETOROLAC TROMETHAMINE 30 MG: 30 INJECTION, SOLUTION INTRAMUSCULAR; INTRAVENOUS at 16:56

## 2020-10-02 RX ADMIN — CEFTRIAXONE 1 G: 1 INJECTION, SOLUTION INTRAVENOUS at 19:38

## 2020-10-02 RX ADMIN — ONDANSETRON 4 MG: 2 INJECTION INTRAMUSCULAR; INTRAVENOUS at 16:56

## 2020-10-02 NOTE — DISCHARGE INSTRUCTIONS
It appears she had a bladder infection that is ascending up towards her kidney on the right side.  Take antibiotic as directed until medication is complete unless directed by another provider.  Your urine was sent for culture so if an additional or different antibiotic needs to be used, you will receive a phone call.  Try to drink plenty of fluids to help flush out your  system.  May take Tylenol to help with your pain.  Take ondansetron as needed for nausea and vomiting.  You may follow-up with your primary care provider in the next few days to reassess your systems, may need to follow-up with urology if your symptoms persist.  They may need to do other imaging tests to evaluate.  Try to eat a bland diet and avoid greasy, fried or fatty foods, continue the appointment to get your ultrasound as scheduled.  Return here to the ER for any change, worsening symptoms, or any additional concerns including but not limited to inability to urinate, intractable vomiting, fever greater than 100.4, yellowing of the skin or eyes, severe worsening pain.

## 2020-10-02 NOTE — ED PROVIDER NOTES
"Subjective   Patient is a 50-year-old male with a history of asthma, back pain, chronic kidney disease, diabetes, hypertension, kidney stones, MI, and pilonidal cyst presenting to the ER for evaluation of abdominal pain.  Patient states he has \"fighting\" with his gallbladder for the past few years.  He states he was told he did not have enough stones in his gallbladder to take it out a few years ago when he was seen by a surgeon.  He states that approximately 4 days ago he began having pain in his right side after eating a cheeseburger; pain is located his right upper stomach radiating to his back and his right side.  He states today the pain became more constant and he has not even been able to hold on a sip of water due to emesis, associated nausea.  He states that he has had some loose bowel movements but denies any hematochezia or melena.  Patient states he has had some dysuria as well but denies any scrotal pain or hematuria.   Denies any fever, chills, headache, chest pain, shortness of breath, productive cough, yellowing of the skin or eyes, or any other symptoms.  He states he has an ultrasound scheduled Tuesday morning but the pain was so severe he wanted to come and get checked out.  Denies any recent sick contacts.           Review of Systems   Constitutional: Negative for chills and fever.   HENT: Negative.    Eyes: Negative.    Respiratory: Negative.    Cardiovascular: Negative.    Gastrointestinal: Positive for abdominal pain, nausea and vomiting. Negative for blood in stool, constipation and diarrhea.   Genitourinary: Negative.    Musculoskeletal: Negative.    Skin: Negative.    Allergic/Immunologic: Negative for immunocompromised state.   Neurological: Negative.    Psychiatric/Behavioral: Negative.        Past Medical History:   Diagnosis Date   • Asthma    • Back pain    • CKD (chronic kidney disease)    • Diabetes mellitus (CMS/[facility])    • Hypertension    • Kidney stone    • Myocardial infarction " "(CMS/[facility])     MI IN 2006, 2012, 2020   • Pilonidal cyst    • Plantar fasciitis    • Renal disorder        Allergies   Allergen Reactions   • Morphine And Related Anaphylaxis   • Penicillins Hives   • Pradaxa [Dabigatran Etexilate Mesylate] Hives       Past Surgical History:   Procedure Laterality Date   • APPENDECTOMY     • CARDIAC CATHETERIZATION     • COLONOSCOPY     • CORONARY ANGIOPLASTY     • PLANTAR FASCIA RELEASE         History reviewed. No pertinent family history.    Social History     Socioeconomic History   • Marital status:      Spouse name: Not on file   • Number of children: Not on file   • Years of education: Not on file   • Highest education level: Not on file   Tobacco Use   • Smoking status: Never Smoker   • Smokeless tobacco: Never Used   Substance and Sexual Activity   • Alcohol use: No   • Drug use: No   • Sexual activity: Defer           Objective   Physical Exam  Vitals signs and nursing note reviewed.     /70 (Patient Position: Sitting)   Pulse 59   Temp 98.3 °F (36.8 °C) (Oral)   Resp 16   Ht 185.4 cm (73\")   Wt 128 kg (282 lb 12.8 oz)   SpO2 98%   BMI 37.31 kg/m²     GEN: No acute distress, sitting up on the stretcher.  Awake and alert.  Does not appear toxic.  Head: Normocephalic, atraumatic  Eyes: EOM intact  ENT: Mask in place per protocol  Cardiovascular: Regular rate and rhythm   Lungs: Clear to auscultation bilaterally without adventitous sounds  Abdomen: Large but nondistended.  Bowel sounds present.  He does have tenderness to palpation in the right upper quadrant with some mild guarding  Extremities: No edema, normal appearance, full ROM  Back: Right side CVA tenderness   Neuro: GCS 15  Psych: Mood and affect are appropriate    Procedures           ED Course  ED Course as of Oct 02 2121   Fri Oct 02, 2020   1705 WBC: 7.39 [LA]   1705 Hemoglobin: 16.3 [LA]   1711 RBC, UA(!): 13-20 [LA]   1711 WBC, UA(!): 0-2 [LA]   1711 Bacteria, UA: None Seen [LA]   1711 " Hyaline Casts, UA: None Seen [LA]   1711 Mucus, UA(!): Moderate/2+ [LA]   1711 Methodology:: Manual Light Microscopy [LA]   1711 Color, UA: Yellow [LA]   1711 Appearance, UA: Clear [LA]   1711 pH, UA: 8.0 [LA]   1711 Specific Gravity, UA: 1.020 [LA]   1711 Glucose(!): 100 mg/dL (Trace) [LA]   1711 Ketones, UA(!): 15 mg/dL (1+) [LA]   1711 Bilirubin, UA: Negative [LA]   1711 Blood, UA(!): Trace [LA]   1711 Protein, UA(!): >=300 mg/dL (3+) [LA]   1711 Leukocytes, UA: Negative [LA]   1711 Nitrite, UA: Negative [LA]   1711 Urobilinogen, UA: 1.0 E.U./dL [LA]   1722 Glucose(!): 147 [LA]   1723 Creatinine: 1.00 [LA]   1723 Sodium: 137 [LA]   1723 Potassium: 3.6 [LA]   1723 Chloride: 101 [LA]   1723 CO2: 25.9 [LA]   1723 Calcium: 9.1 [LA]   1723 Total Protein: 6.9 [LA]   1723 Albumin: 3.60 [LA]   1723 ALT (SGPT)(!): 98 [LA]   1723 AST (SGOT)(!): 50 [LA]   1723 Alkaline Phosphatase: 94 [LA]   1723 Total Bilirubin: 1.0 [LA]   1723 eGFR Non  Am: 78 [LA]   1723 Globulin: 3.3 [LA]   1723 A/G Ratio: 1.1 [LA]   1723 BUN/Creatinine Ratio: 17.0 [LA]   1723 Lipase: 20 [LA]   1723 In previous comparison, patient had mildly elevated liver enzymes 2 years ago.    [LA]   1810 Patient is resting comfortably in the stretcher.  He states his pain is improved with medication.  Still awaiting CT scan results.    [LA]   1854 TECHNIQUE:  Routine axial images through the abdomen and pelvis were  obtained following IV and oral contrast administration.     CLINICAL HISTORY:  Right upper quadrant and epigastric pain radiating to the back,  vomiting     FINDINGS:  Abdomen: The gallbladder is normal.  There is no biliary ductal  dilation.  There is cirrhosis without focal liver lesion.  The  spleen is normal.  There are no convincing upper abdominal  venous collaterals.  There are small bilateral renal cysts.  There is mild wall thickening of the left renal pelvis and  stranding of the left periureteral fat, likely due to ascending  urinary  infection.  There is no ureteral calculus.  The GI tract  is unremarkable, with no sign of appendicitis.  Pelvis: The  urinary bladder is unremarkable.   There is no pelvic or  abdominal ascites, adenopathy or acute osseous abnormality.     IMPRESSION:  Signs of ascending urinary infection on the right side.  Cirrhosis without portal hypertension or ascites.     Authenticated by Jani Vann M.D. on 10/02/2020 06:54:20 PM    [LA]   1900 Discussed findings with patient.  We will give him antibiotics with first dose IV here, will culture urine, send him to urology.  I did encourage he keep his outpatient ultrasound and follow-up with his primary care provider.  Discussed strict return precautions.  He verbalized understanding and was in agreement with this plan of care    [LA]   1905 Patient has penicillin allergy listed with reaction being hives but I did discuss with him and he states he has had Keflex and other cephalosporins in the past without adverse reaction.    [LA]      ED Course User Index  [LA] Gail Tariq PA-C                                           MDM  Number of Diagnoses or Management Options  Non-intractable vomiting with nausea, unspecified vomiting type:   Pyelonephritis:   Diagnosis management comments: On arrival, patient is stable, no acute distress.  He is afebrile.  Differential includes cholelithiasis, choledocholithiasis, cholecystitis, colitis, nephrolithiasis, urinary tract infection, pancreatitis, and other concerns.  Will check basic labs including a lipase, urinalysis, give IV fluids, Toradol and Zofran.    Patient had mildly elevated liver enzymes but it appears these are stable in comparison to previous.  He did have an elevated glucose of 147, no other significant lecture abnormalities.  He had no leukocytosis.  His lipase was not significantly elevated.  He had trace blood and ketones in his urine with protein, 13-20 red blood cells, 0-2 white blood cells with moderate mucus.   Patient felt better after his medications and fluids.  CT was read by the radiologist as an ascending urinary infection on the right side, also noted cirrhosis with portal hypertension or ascites.  Discussed these findings with the patient, given his dysuria, will treat with antibiotics; urine was sent for culture.  Will give urology follow-up as needed.  He has tolerated cephalosporins in the past, will give a dose here with p.o. medication as well as nausea medicine.  I did discuss the importance of following up with primary care and getting his ultrasound of the gallbladder.  Discussed very strict return precautions.  He verbalized understanding was in agreement with this plan of care.           Amount and/or Complexity of Data Reviewed  Clinical lab tests: reviewed and ordered  Tests in the radiology section of CPT®: reviewed and ordered  Discussion of test results with the performing providers: yes  Decide to obtain previous medical records or to obtain history from someone other than the patient: yes  Review and summarize past medical records: yes  Discuss the patient with other providers: yes    Risk of Complications, Morbidity, and/or Mortality  Presenting problems: moderate  Diagnostic procedures: moderate  Management options: low    Patient Progress  Patient progress: stable      Final diagnoses:   Pyelonephritis   Non-intractable vomiting with nausea, unspecified vomiting type            Gail Tariq PA-C  10/02/20 8653

## 2020-10-04 ENCOUNTER — APPOINTMENT (OUTPATIENT)
Dept: ULTRASOUND IMAGING | Facility: HOSPITAL | Age: 50
End: 2020-10-04

## 2020-10-04 ENCOUNTER — HOSPITAL ENCOUNTER (EMERGENCY)
Facility: HOSPITAL | Age: 50
Discharge: HOME | End: 2020-10-05
Attending: STUDENT IN AN ORGANIZED HEALTH CARE EDUCATION/TRAINING PROGRAM | Admitting: STUDENT IN AN ORGANIZED HEALTH CARE EDUCATION/TRAINING PROGRAM

## 2020-10-04 DIAGNOSIS — Z87.440 HISTORY OF UTI: ICD-10-CM

## 2020-10-04 DIAGNOSIS — R10.31 RIGHT LOWER QUADRANT ABDOMINAL PAIN: Primary | ICD-10-CM

## 2020-10-04 LAB
ALBUMIN SERPL-MCNC: 3.6 G/DL (ref 3.5–5.2)
ALBUMIN/GLOB SERPL: 1.2 G/DL
ALP SERPL-CCNC: 94 U/L (ref 39–117)
ALT SERPL W P-5'-P-CCNC: 72 U/L (ref 1–41)
ANION GAP SERPL CALCULATED.3IONS-SCNC: 11.2 MMOL/L (ref 5–15)
AST SERPL-CCNC: 40 U/L (ref 1–40)
BACTERIA SPEC AEROBE CULT: NO GROWTH
BASOPHILS # BLD AUTO: 0.04 10*3/MM3 (ref 0–0.2)
BASOPHILS NFR BLD AUTO: 0.7 % (ref 0–1.5)
BILIRUB SERPL-MCNC: 0.5 MG/DL (ref 0–1.2)
BUN SERPL-MCNC: 15 MG/DL (ref 6–20)
BUN/CREAT SERPL: 16.1 (ref 7–25)
CALCIUM SPEC-SCNC: 9 MG/DL (ref 8.6–10.5)
CHLORIDE SERPL-SCNC: 99 MMOL/L (ref 98–107)
CO2 SERPL-SCNC: 23.8 MMOL/L (ref 22–29)
CREAT SERPL-MCNC: 0.93 MG/DL (ref 0.76–1.27)
DEPRECATED RDW RBC AUTO: 43.8 FL (ref 37–54)
EOSINOPHIL # BLD AUTO: 0.1 10*3/MM3 (ref 0–0.4)
EOSINOPHIL NFR BLD AUTO: 1.6 % (ref 0.3–6.2)
ERYTHROCYTE [DISTWIDTH] IN BLOOD BY AUTOMATED COUNT: 13 % (ref 12.3–15.4)
GFR SERPL CREATININE-BSD FRML MDRD: 85 ML/MIN/1.73
GLOBULIN UR ELPH-MCNC: 3 GM/DL
GLUCOSE SERPL-MCNC: 285 MG/DL (ref 65–99)
HCT VFR BLD AUTO: 43.3 % (ref 37.5–51)
HGB BLD-MCNC: 15.1 G/DL (ref 13–17.7)
IMM GRANULOCYTES # BLD AUTO: 0.01 10*3/MM3 (ref 0–0.05)
IMM GRANULOCYTES NFR BLD AUTO: 0.2 % (ref 0–0.5)
LIPASE SERPL-CCNC: 31 U/L (ref 13–60)
LYMPHOCYTES # BLD AUTO: 1.74 10*3/MM3 (ref 0.7–3.1)
LYMPHOCYTES NFR BLD AUTO: 28.5 % (ref 19.6–45.3)
MCH RBC QN AUTO: 31.9 PG (ref 26.6–33)
MCHC RBC AUTO-ENTMCNC: 34.9 G/DL (ref 31.5–35.7)
MCV RBC AUTO: 91.5 FL (ref 79–97)
MONOCYTES # BLD AUTO: 0.43 10*3/MM3 (ref 0.1–0.9)
MONOCYTES NFR BLD AUTO: 7 % (ref 5–12)
NEUTROPHILS NFR BLD AUTO: 3.78 10*3/MM3 (ref 1.7–7)
NEUTROPHILS NFR BLD AUTO: 62 % (ref 42.7–76)
NRBC BLD AUTO-RTO: 0 /100 WBC (ref 0–0.2)
PLATELET # BLD AUTO: 133 10*3/MM3 (ref 140–450)
PMV BLD AUTO: 11.1 FL (ref 6–12)
POTASSIUM SERPL-SCNC: 3.8 MMOL/L (ref 3.5–5.2)
PROT SERPL-MCNC: 6.6 G/DL (ref 6–8.5)
RBC # BLD AUTO: 4.73 10*6/MM3 (ref 4.14–5.8)
SODIUM SERPL-SCNC: 134 MMOL/L (ref 136–145)
WBC # BLD AUTO: 6.1 10*3/MM3 (ref 3.4–10.8)

## 2020-10-04 PROCEDURE — 96375 TX/PRO/DX INJ NEW DRUG ADDON: CPT

## 2020-10-04 PROCEDURE — 80053 COMPREHEN METABOLIC PANEL: CPT | Performed by: STUDENT IN AN ORGANIZED HEALTH CARE EDUCATION/TRAINING PROGRAM

## 2020-10-04 PROCEDURE — 85025 COMPLETE CBC W/AUTO DIFF WBC: CPT | Performed by: STUDENT IN AN ORGANIZED HEALTH CARE EDUCATION/TRAINING PROGRAM

## 2020-10-04 PROCEDURE — 25010000002 KETOROLAC TROMETHAMINE PER 15 MG: Performed by: STUDENT IN AN ORGANIZED HEALTH CARE EDUCATION/TRAINING PROGRAM

## 2020-10-04 PROCEDURE — 76705 ECHO EXAM OF ABDOMEN: CPT

## 2020-10-04 PROCEDURE — 83690 ASSAY OF LIPASE: CPT | Performed by: STUDENT IN AN ORGANIZED HEALTH CARE EDUCATION/TRAINING PROGRAM

## 2020-10-04 PROCEDURE — 99284 EMERGENCY DEPT VISIT MOD MDM: CPT

## 2020-10-04 PROCEDURE — 96374 THER/PROPH/DIAG INJ IV PUSH: CPT

## 2020-10-04 PROCEDURE — 25010000002 ONDANSETRON PER 1 MG: Performed by: STUDENT IN AN ORGANIZED HEALTH CARE EDUCATION/TRAINING PROGRAM

## 2020-10-04 RX ORDER — ONDANSETRON 2 MG/ML
4 INJECTION INTRAMUSCULAR; INTRAVENOUS ONCE
Status: COMPLETED | OUTPATIENT
Start: 2020-10-04 | End: 2020-10-04

## 2020-10-04 RX ORDER — SODIUM CHLORIDE 0.9 % (FLUSH) 0.9 %
10 SYRINGE (ML) INJECTION AS NEEDED
Status: DISCONTINUED | OUTPATIENT
Start: 2020-10-04 | End: 2020-10-05 | Stop reason: HOSPADM

## 2020-10-04 RX ORDER — KETOROLAC TROMETHAMINE 30 MG/ML
30 INJECTION, SOLUTION INTRAMUSCULAR; INTRAVENOUS ONCE
Status: COMPLETED | OUTPATIENT
Start: 2020-10-04 | End: 2020-10-04

## 2020-10-04 RX ADMIN — SODIUM CHLORIDE 1000 ML: 9 INJECTION, SOLUTION INTRAVENOUS at 23:47

## 2020-10-04 RX ADMIN — ONDANSETRON 4 MG: 2 INJECTION INTRAMUSCULAR; INTRAVENOUS at 23:47

## 2020-10-04 RX ADMIN — KETOROLAC TROMETHAMINE 30 MG: 30 INJECTION, SOLUTION INTRAMUSCULAR; INTRAVENOUS at 23:46

## 2020-10-05 VITALS
WEIGHT: 288.4 LBS | DIASTOLIC BLOOD PRESSURE: 67 MMHG | OXYGEN SATURATION: 97 % | SYSTOLIC BLOOD PRESSURE: 129 MMHG | HEIGHT: 73 IN | HEART RATE: 65 BPM | RESPIRATION RATE: 19 BRPM | BODY MASS INDEX: 38.22 KG/M2 | TEMPERATURE: 98.3 F

## 2020-10-05 LAB
BACTERIA UR QL AUTO: ABNORMAL /HPF
BILIRUB UR QL STRIP: NEGATIVE
CLARITY UR: ABNORMAL
COLOR UR: YELLOW
GLUCOSE UR STRIP-MCNC: ABNORMAL MG/DL
HGB UR QL STRIP.AUTO: ABNORMAL
HOLD SPECIMEN: NORMAL
HOLD SPECIMEN: NORMAL
HYALINE CASTS UR QL AUTO: ABNORMAL /LPF
KETONES UR QL STRIP: NEGATIVE
LEUKOCYTE ESTERASE UR QL STRIP.AUTO: NEGATIVE
NITRITE UR QL STRIP: NEGATIVE
PH UR STRIP.AUTO: 5.5 [PH] (ref 5–8)
PROT UR QL STRIP: ABNORMAL
RBC # UR: ABNORMAL /HPF
REF LAB TEST METHOD: ABNORMAL
SP GR UR STRIP: 1.02 (ref 1–1.03)
SQUAMOUS #/AREA URNS HPF: ABNORMAL /HPF
UROBILINOGEN UR QL STRIP: ABNORMAL
WBC UR QL AUTO: ABNORMAL /HPF
WHOLE BLOOD HOLD SPECIMEN: NORMAL
WHOLE BLOOD HOLD SPECIMEN: NORMAL

## 2020-10-05 PROCEDURE — 81001 URINALYSIS AUTO W/SCOPE: CPT | Performed by: STUDENT IN AN ORGANIZED HEALTH CARE EDUCATION/TRAINING PROGRAM

## 2020-10-05 RX ORDER — KETOROLAC TROMETHAMINE 10 MG/1
10 TABLET, FILM COATED ORAL EVERY 6 HOURS PRN
Qty: 20 TABLET | Refills: 0 | Status: SHIPPED | OUTPATIENT
Start: 2020-10-05 | End: 2021-08-23 | Stop reason: HOSPADM

## 2020-10-05 NOTE — ED PROVIDER NOTES
Subjective   50-year-old male that presents with right-sided abdominal pain.  States this started 2 nights ago when he was seen here where he had a full work-up including CT scanning was told he had a urinary tract infection.  The patient states he believes it is his gallbladder.  States every time he eats he has pain.  He ate a bologna sandwich earlier and that is what caused this exacerbation.  States the pain is severe, constant, sharp and stabbing, and nothing makes better or worse.  States the medicine he got through his IV when he was here on Friday did help him and allowed him to sleep some.          Review of Systems   All other systems reviewed and are negative.      Past Medical History:   Diagnosis Date   • Asthma    • Back pain    • CKD (chronic kidney disease)    • Diabetes mellitus (CMS/[facility])    • Hypertension    • Kidney stone    • Myocardial infarction (CMS/[facility])     MI IN 2006, 2012, 2020   • Pilonidal cyst    • Plantar fasciitis    • Renal disorder        Allergies   Allergen Reactions   • Morphine And Related Anaphylaxis   • Penicillins Hives   • Pradaxa [Dabigatran Etexilate Mesylate] Hives       Past Surgical History:   Procedure Laterality Date   • APPENDECTOMY     • CARDIAC CATHETERIZATION     • COLONOSCOPY     • CORONARY ANGIOPLASTY     • PLANTAR FASCIA RELEASE         History reviewed. No pertinent family history.    Social History     Socioeconomic History   • Marital status:      Spouse name: Not on file   • Number of children: Not on file   • Years of education: Not on file   • Highest education level: Not on file   Tobacco Use   • Smoking status: Never Smoker   • Smokeless tobacco: Never Used   Substance and Sexual Activity   • Alcohol use: No   • Drug use: No   • Sexual activity: Defer           Objective   Physical Exam  Vitals signs and nursing note reviewed.     GEN: No acute distress  Head: Normocephalic, atraumatic  Eyes: Pupils equal round reactive to light  ENT: Posterior  pharynx normal in appearance, oral mucosa is moist  Chest: Nontender to palpation  Cardiovascular: Regular rate  Lungs: Clear to auscultation bilaterally  Abdomen: Soft, nontender, nondistended, no peritoneal signs  Extremities: No edema, normal appearance  Neuro: GCS 15  Psych: Mood and affect are appropriate    Procedures           ED Course                                           MDM  Number of Diagnoses or Management Options  History of UTI:   Right lower quadrant abdominal pain:   Diagnosis management comments: Ultrasound gallbladder showed no evidence of disease.  Urinalysis does appear to be improving.  Will prescribe ketorolac as this does help his symptoms.       Amount and/or Complexity of Data Reviewed  Clinical lab tests: reviewed  Decide to obtain previous medical records or to obtain history from someone other than the patient: yes  Obtain history from someone other than the patient: yes  Review and summarize past medical records: yes  Independent visualization of images, tracings, or specimens: yes        Final diagnoses:   Right lower quadrant abdominal pain   History of UTI            Karel Montoya MD  10/05/20 0145

## 2021-02-10 ENCOUNTER — HOSPITAL ENCOUNTER (OUTPATIENT)
Dept: GENERAL RADIOLOGY | Facility: HOSPITAL | Age: 51
Discharge: HOME | End: 2021-02-10
Admitting: FAMILY MEDICINE

## 2021-02-10 ENCOUNTER — TRANSCRIBE ORDERS (OUTPATIENT)
Dept: GENERAL RADIOLOGY | Facility: HOSPITAL | Age: 51
End: 2021-02-10

## 2021-02-10 DIAGNOSIS — M54.2 NECK PAIN: ICD-10-CM

## 2021-02-10 DIAGNOSIS — M54.9 UPPER BACK PAIN: ICD-10-CM

## 2021-02-10 DIAGNOSIS — M25.512 LEFT SHOULDER PAIN, UNSPECIFIED CHRONICITY: Primary | ICD-10-CM

## 2021-02-10 DIAGNOSIS — M54.2 CERVICALGIA: ICD-10-CM

## 2021-02-10 DIAGNOSIS — M25.512 LEFT SHOULDER PAIN, UNSPECIFIED CHRONICITY: ICD-10-CM

## 2021-02-10 PROCEDURE — 73030 X-RAY EXAM OF SHOULDER: CPT

## 2021-02-10 PROCEDURE — 72070 X-RAY EXAM THORAC SPINE 2VWS: CPT

## 2021-02-10 PROCEDURE — 72040 X-RAY EXAM NECK SPINE 2-3 VW: CPT

## 2021-06-25 PROCEDURE — 96375 TX/PRO/DX INJ NEW DRUG ADDON: CPT

## 2021-06-25 PROCEDURE — 96374 THER/PROPH/DIAG INJ IV PUSH: CPT

## 2021-06-25 PROCEDURE — 99283 EMERGENCY DEPT VISIT LOW MDM: CPT

## 2021-06-26 ENCOUNTER — APPOINTMENT (OUTPATIENT)
Dept: ULTRASOUND IMAGING | Facility: HOSPITAL | Age: 51
End: 2021-06-26

## 2021-06-26 ENCOUNTER — HOSPITAL ENCOUNTER (EMERGENCY)
Facility: HOSPITAL | Age: 51
Discharge: HOME | End: 2021-06-26
Attending: EMERGENCY MEDICINE | Admitting: EMERGENCY MEDICINE

## 2021-06-26 ENCOUNTER — APPOINTMENT (OUTPATIENT)
Dept: CT IMAGING | Facility: HOSPITAL | Age: 51
End: 2021-06-26

## 2021-06-26 VITALS
HEIGHT: 73 IN | BODY MASS INDEX: 40.53 KG/M2 | TEMPERATURE: 97.9 F | DIASTOLIC BLOOD PRESSURE: 99 MMHG | SYSTOLIC BLOOD PRESSURE: 165 MMHG | HEART RATE: 86 BPM | WEIGHT: 305.8 LBS | OXYGEN SATURATION: 96 % | RESPIRATION RATE: 18 BRPM

## 2021-06-26 DIAGNOSIS — R10.9 ABDOMINAL PAIN, UNSPECIFIED ABDOMINAL LOCATION: Primary | ICD-10-CM

## 2021-06-26 LAB
ALBUMIN SERPL-MCNC: 3.7 G/DL (ref 3.5–5.2)
ALBUMIN/GLOB SERPL: 1.1 G/DL
ALP SERPL-CCNC: 99 U/L (ref 39–117)
ALT SERPL W P-5'-P-CCNC: 125 U/L (ref 1–41)
ANION GAP SERPL CALCULATED.3IONS-SCNC: 10.6 MMOL/L (ref 5–15)
AST SERPL-CCNC: 75 U/L (ref 1–40)
BASOPHILS # BLD AUTO: 0.05 10*3/MM3 (ref 0–0.2)
BASOPHILS NFR BLD AUTO: 0.6 % (ref 0–1.5)
BILIRUB SERPL-MCNC: 0.8 MG/DL (ref 0–1.2)
BILIRUB UR QL STRIP: NEGATIVE
BILIRUB UR QL STRIP: NEGATIVE
BUN SERPL-MCNC: 13 MG/DL (ref 6–20)
BUN/CREAT SERPL: 16.9 (ref 7–25)
CALCIUM SPEC-SCNC: 8.5 MG/DL (ref 8.6–10.5)
CHLORIDE SERPL-SCNC: 100 MMOL/L (ref 98–107)
CLARITY UR: CLEAR
CLARITY UR: CLEAR
CO2 SERPL-SCNC: 22.4 MMOL/L (ref 22–29)
COLOR UR: ABNORMAL
COLOR UR: ABNORMAL
CREAT SERPL-MCNC: 0.77 MG/DL (ref 0.76–1.27)
DEPRECATED RDW RBC AUTO: 44.7 FL (ref 37–54)
EOSINOPHIL # BLD AUTO: 0.12 10*3/MM3 (ref 0–0.4)
EOSINOPHIL NFR BLD AUTO: 1.5 % (ref 0.3–6.2)
ERYTHROCYTE [DISTWIDTH] IN BLOOD BY AUTOMATED COUNT: 13.4 % (ref 12.3–15.4)
GFR SERPL CREATININE-BSD FRML MDRD: 106 ML/MIN/1.73
GLOBULIN UR ELPH-MCNC: 3.3 GM/DL
GLUCOSE SERPL-MCNC: 235 MG/DL (ref 65–99)
GLUCOSE UR STRIP-MCNC: ABNORMAL MG/DL
GLUCOSE UR STRIP-MCNC: ABNORMAL MG/DL
HCT VFR BLD AUTO: 50.2 % (ref 37.5–51)
HGB BLD-MCNC: 17.4 G/DL (ref 13–17.7)
HGB UR QL STRIP.AUTO: NEGATIVE
HGB UR QL STRIP.AUTO: NEGATIVE
IMM GRANULOCYTES # BLD AUTO: 0.02 10*3/MM3 (ref 0–0.05)
IMM GRANULOCYTES NFR BLD AUTO: 0.2 % (ref 0–0.5)
KETONES UR QL STRIP: ABNORMAL
KETONES UR QL STRIP: ABNORMAL
LEUKOCYTE ESTERASE UR QL STRIP.AUTO: NEGATIVE
LEUKOCYTE ESTERASE UR QL STRIP.AUTO: NEGATIVE
LIPASE SERPL-CCNC: 20 U/L (ref 13–60)
LYMPHOCYTES # BLD AUTO: 1.96 10*3/MM3 (ref 0.7–3.1)
LYMPHOCYTES NFR BLD AUTO: 23.9 % (ref 19.6–45.3)
MCH RBC QN AUTO: 31.2 PG (ref 26.6–33)
MCHC RBC AUTO-ENTMCNC: 34.7 G/DL (ref 31.5–35.7)
MCV RBC AUTO: 90.1 FL (ref 79–97)
MONOCYTES # BLD AUTO: 0.56 10*3/MM3 (ref 0.1–0.9)
MONOCYTES NFR BLD AUTO: 6.8 % (ref 5–12)
NEUTROPHILS NFR BLD AUTO: 5.49 10*3/MM3 (ref 1.7–7)
NEUTROPHILS NFR BLD AUTO: 67 % (ref 42.7–76)
NITRITE UR QL STRIP: NEGATIVE
NITRITE UR QL STRIP: NEGATIVE
NRBC BLD AUTO-RTO: 0 /100 WBC (ref 0–0.2)
PH UR STRIP.AUTO: <=5 [PH] (ref 5–8)
PH UR STRIP.AUTO: <=5 [PH] (ref 5–8)
PLATELET # BLD AUTO: 124 10*3/MM3 (ref 140–450)
PMV BLD AUTO: 11.1 FL (ref 6–12)
POTASSIUM SERPL-SCNC: 4 MMOL/L (ref 3.5–5.2)
PROT SERPL-MCNC: 7 G/DL (ref 6–8.5)
PROT UR QL STRIP: NEGATIVE
PROT UR QL STRIP: NEGATIVE
RBC # BLD AUTO: 5.57 10*6/MM3 (ref 4.14–5.8)
RBC MORPH BLD: NORMAL
SMALL PLATELETS BLD QL SMEAR: NORMAL
SODIUM SERPL-SCNC: 133 MMOL/L (ref 136–145)
SP GR UR STRIP: >=1.03 (ref 1–1.03)
SP GR UR STRIP: >=1.03 (ref 1–1.03)
UROBILINOGEN UR QL STRIP: ABNORMAL
UROBILINOGEN UR QL STRIP: ABNORMAL
WBC # BLD AUTO: 8.2 10*3/MM3 (ref 3.4–10.8)
WBC MORPH BLD: NORMAL

## 2021-06-26 PROCEDURE — 25010000002 ONDANSETRON PER 1 MG: Performed by: EMERGENCY MEDICINE

## 2021-06-26 PROCEDURE — 74177 CT ABD & PELVIS W/CONTRAST: CPT

## 2021-06-26 PROCEDURE — 80053 COMPREHEN METABOLIC PANEL: CPT | Performed by: EMERGENCY MEDICINE

## 2021-06-26 PROCEDURE — 81003 URINALYSIS AUTO W/O SCOPE: CPT | Performed by: EMERGENCY MEDICINE

## 2021-06-26 PROCEDURE — 25010000002 IOPAMIDOL 61 % SOLUTION: Performed by: EMERGENCY MEDICINE

## 2021-06-26 PROCEDURE — 76705 ECHO EXAM OF ABDOMEN: CPT

## 2021-06-26 PROCEDURE — 96374 THER/PROPH/DIAG INJ IV PUSH: CPT

## 2021-06-26 PROCEDURE — 25010000002 KETOROLAC TROMETHAMINE PER 15 MG: Performed by: EMERGENCY MEDICINE

## 2021-06-26 PROCEDURE — 85025 COMPLETE CBC W/AUTO DIFF WBC: CPT | Performed by: EMERGENCY MEDICINE

## 2021-06-26 PROCEDURE — 25010000002 HYDROMORPHONE 1 MG/ML SOLUTION: Performed by: EMERGENCY MEDICINE

## 2021-06-26 PROCEDURE — 85007 BL SMEAR W/DIFF WBC COUNT: CPT | Performed by: EMERGENCY MEDICINE

## 2021-06-26 PROCEDURE — 96375 TX/PRO/DX INJ NEW DRUG ADDON: CPT

## 2021-06-26 PROCEDURE — 83690 ASSAY OF LIPASE: CPT | Performed by: EMERGENCY MEDICINE

## 2021-06-26 RX ORDER — SODIUM CHLORIDE 0.9 % (FLUSH) 0.9 %
10 SYRINGE (ML) INJECTION AS NEEDED
Status: DISCONTINUED | OUTPATIENT
Start: 2021-06-26 | End: 2021-06-26 | Stop reason: HOSPADM

## 2021-06-26 RX ORDER — VENLAFAXINE 75 MG/1
75 TABLET ORAL DAILY
COMMUNITY
End: 2021-08-23 | Stop reason: HOSPADM

## 2021-06-26 RX ORDER — ONDANSETRON 4 MG/1
4 TABLET, ORALLY DISINTEGRATING ORAL EVERY 8 HOURS PRN
Qty: 12 TABLET | Refills: 0 | Status: SHIPPED | OUTPATIENT
Start: 2021-06-26 | End: 2021-08-30

## 2021-06-26 RX ORDER — ONDANSETRON 2 MG/ML
4 INJECTION INTRAMUSCULAR; INTRAVENOUS ONCE
Status: COMPLETED | OUTPATIENT
Start: 2021-06-26 | End: 2021-06-26

## 2021-06-26 RX ORDER — KETOROLAC TROMETHAMINE 30 MG/ML
10 INJECTION, SOLUTION INTRAMUSCULAR; INTRAVENOUS ONCE
Status: COMPLETED | OUTPATIENT
Start: 2021-06-26 | End: 2021-06-26

## 2021-06-26 RX ORDER — DULAGLUTIDE 1.5 MG/.5ML
1.5 INJECTION, SOLUTION SUBCUTANEOUS WEEKLY
COMMUNITY
End: 2021-08-30

## 2021-06-26 RX ORDER — CLOPIDOGREL BISULFATE 75 MG/1
75 TABLET ORAL DAILY
COMMUNITY
End: 2022-05-16

## 2021-06-26 RX ORDER — HYDROCODONE BITARTRATE AND ACETAMINOPHEN 5; 325 MG/1; MG/1
1 TABLET ORAL EVERY 6 HOURS PRN
Qty: 10 TABLET | Refills: 0 | Status: SHIPPED | OUTPATIENT
Start: 2021-06-26 | End: 2021-08-23 | Stop reason: HOSPADM

## 2021-06-26 RX ADMIN — HYDROMORPHONE HYDROCHLORIDE 1 MG: 1 INJECTION, SOLUTION INTRAMUSCULAR; INTRAVENOUS; SUBCUTANEOUS at 03:28

## 2021-06-26 RX ADMIN — ONDANSETRON 4 MG: 2 INJECTION INTRAMUSCULAR; INTRAVENOUS at 01:19

## 2021-06-26 RX ADMIN — KETOROLAC TROMETHAMINE 10 MG: 30 INJECTION, SOLUTION INTRAMUSCULAR; INTRAVENOUS at 01:19

## 2021-06-26 RX ADMIN — SODIUM CHLORIDE 1000 ML: 9 INJECTION, SOLUTION INTRAVENOUS at 01:19

## 2021-06-26 RX ADMIN — IOPAMIDOL 100 ML: 612 INJECTION, SOLUTION INTRAVENOUS at 01:51

## 2021-06-28 ENCOUNTER — OFFICE VISIT (OUTPATIENT)
Dept: SURGERY | Facility: CLINIC | Age: 51
End: 2021-06-28

## 2021-06-28 VITALS
WEIGHT: 304 LBS | OXYGEN SATURATION: 99 % | SYSTOLIC BLOOD PRESSURE: 132 MMHG | HEART RATE: 81 BPM | DIASTOLIC BLOOD PRESSURE: 82 MMHG | BODY MASS INDEX: 40.29 KG/M2 | HEIGHT: 73 IN | TEMPERATURE: 98.4 F

## 2021-06-28 DIAGNOSIS — R10.11 RIGHT UPPER QUADRANT ABDOMINAL PAIN: Primary | ICD-10-CM

## 2021-06-28 DIAGNOSIS — Z01.818 PRE-OP TESTING: ICD-10-CM

## 2021-06-28 DIAGNOSIS — K52.9 CHRONIC DIARRHEA: ICD-10-CM

## 2021-06-28 DIAGNOSIS — R10.13 EPIGASTRIC PAIN: ICD-10-CM

## 2021-06-28 PROCEDURE — 99244 OFF/OP CNSLTJ NEW/EST MOD 40: CPT | Performed by: SURGERY

## 2021-06-28 RX ORDER — NITROGLYCERIN 0.4 MG/1
0.4 TABLET SUBLINGUAL
COMMUNITY
End: 2022-05-16

## 2021-06-28 RX ORDER — CILOSTAZOL 50 MG/1
TABLET ORAL
COMMUNITY
Start: 2021-05-05 | End: 2022-05-16

## 2021-06-28 RX ORDER — LISINOPRIL AND HYDROCHLOROTHIAZIDE 25; 20 MG/1; MG/1
1 TABLET ORAL DAILY
COMMUNITY
Start: 2021-05-05 | End: 2022-05-16

## 2021-06-28 RX ORDER — HYDROXYZINE HYDROCHLORIDE 10 MG/1
10 TABLET, FILM COATED ORAL 2 TIMES DAILY PRN
COMMUNITY
Start: 2021-06-05 | End: 2023-03-08

## 2021-06-28 RX ORDER — ASPIRIN 325 MG
325 TABLET ORAL DAILY
COMMUNITY
Start: 2021-05-05 | End: 2022-05-16

## 2021-06-28 RX ORDER — ALBUTEROL SULFATE 90 UG/1
1-2 AEROSOL, METERED RESPIRATORY (INHALATION) AS NEEDED
COMMUNITY
Start: 2021-06-08 | End: 2023-03-08

## 2021-06-28 RX ORDER — PANTOPRAZOLE SODIUM 40 MG/1
40 TABLET, DELAYED RELEASE ORAL DAILY
COMMUNITY
Start: 2021-06-08 | End: 2021-11-23 | Stop reason: HOSPADM

## 2021-06-28 RX ORDER — CARVEDILOL 6.25 MG/1
6.25 TABLET ORAL
COMMUNITY
End: 2021-07-07 | Stop reason: HOSPADM

## 2021-06-28 NOTE — PROGRESS NOTES
Patient: Kb Minaya    YOB: 1970    Date: 06/28/2021    Primary Care Provider: Vilma Cassidy MD    Chief Complaint   Patient presents with   • Abdominal Pain   • Nausea       SUBJECTIVE:    History of present illness:  I saw the patient in the office today as a consultation for evaluation and treatment of bouts of right upper quadrant area abdominal pain, nausea, vomiting and diarrhea which has been ongoing for 3 days.  Patient was seen in the emergency department at [facility] 06/25/2021at which time he had an abdominal ultrasound which showed sludge within the gallbladder and coarsened hepatic echogenicity which can indicate hepatocellular disease including steatosis.    Apparently the patient did have a colonoscopy in the fall of last year at [facility] that was reportedly normal.  He states that he had a previous HIDA scan many years ago at [facility] and the ejection fraction was in the middle range.  He does complain of midepigastric and right upper quadrant abdominal discomfort, present over the past several months, worse with fatty meals and heavy meals, not relieved, associated with diarrhea and epigastric discomfort.    CT scan recently as mentioned above did show evidence of sludge in the gallbladder but it also showed the possibility of enteritis.    The following portions of the patient's history were reviewed and updated as appropriate: allergies, current medications, past family history, past medical history, past social history, past surgical history and problem list.    Review of Systems   Constitutional: Negative for chills, fever and unexpected weight change.   HENT: Negative for trouble swallowing and voice change.    Eyes: Negative for visual disturbance.   Respiratory: Negative for apnea, cough, chest tightness, shortness of breath and wheezing.    Cardiovascular: Negative for chest pain, palpitations and leg swelling.    Gastrointestinal: Positive for abdominal pain, diarrhea, nausea and vomiting. Negative for abdominal distention, anal bleeding, blood in stool, constipation and rectal pain.   Endocrine: Negative for cold intolerance and heat intolerance.   Genitourinary: Negative for difficulty urinating, dysuria, flank pain, scrotal swelling and testicular pain.   Musculoskeletal: Negative for back pain, gait problem and joint swelling.   Skin: Negative for color change, rash and wound.   Neurological: Negative for dizziness, syncope, speech difficulty, weakness, numbness and headaches.   Hematological: Negative for adenopathy. Does not bruise/bleed easily.   Psychiatric/Behavioral: Negative for confusion. The patient is not nervous/anxious.        History:  Past Medical History:   Diagnosis Date   • Asthma    • Back pain    • CKD (chronic kidney disease)    • Diabetes mellitus (CMS/HCC)    • Hypertension    • Kidney stone    • Myocardial infarction (CMS/HCC)     MI IN 2006, 2012, 2020   • Pilonidal cyst    • Plantar fasciitis    • Renal disorder        Past Surgical History:   Procedure Laterality Date   • APPENDECTOMY     • CARDIAC CATHETERIZATION     • COLONOSCOPY     • CORONARY ANGIOPLASTY     • PLANTAR FASCIA RELEASE         History reviewed. No pertinent family history.    Social History     Tobacco Use   • Smoking status: Never Smoker   • Smokeless tobacco: Never Used   Substance Use Topics   • Alcohol use: No   • Drug use: No       Allergies:  Allergies   Allergen Reactions   • Clopidogrel Bisulfate Hives   • Morphine And Related Anaphylaxis   • Simvastatin Other (See Comments)     Liver problems   • Penicillins Hives   • Pradaxa [Dabigatran Etexilate Mesylate] Hives       Medications:    Current Outpatient Medications:   •  ALBUTEROL SULFATE HFA IN, Inhale., Disp: , Rfl:   •  aspirin 325 MG tablet, Take 325 mg by mouth Daily., Disp: , Rfl:   •  carvedilol (COREG) 6.25 MG tablet, Take 6.25 mg by mouth 2 (Two) Times a Day  With Meals., Disp: , Rfl:   •  carvedilol (COREG) 6.25 MG tablet, Take 6.25 mg by mouth., Disp: , Rfl:   •  cilostazol (PLETAL) 50 MG tablet, TAKE 1 TABLET BY MOUTH TWICE DAILY 30 MINUTES BEFORE OR 2 HOURS AFTER BREAKFAST AND DINNER, Disp: , Rfl:   •  clindamycin (CLEOCIN) 150 MG capsule, Take 1 capsule by mouth 3 (Three) Times a Day., Disp: 21 capsule, Rfl: 0  •  clindamycin (CLEOCIN) 300 MG capsule, Take 300 mg by mouth 3 (Three) Times a Day., Disp: , Rfl:   •  clopidogrel (PLAVIX) 75 MG tablet, Take 75 mg by mouth Daily., Disp: , Rfl:   •  cyclobenzaprine (FLEXERIL) 10 MG tablet, Take 1 tablet by mouth 3 (Three) Times a Day., Disp: 20 tablet, Rfl: 0  •  cyclobenzaprine (FLEXERIL) 5 MG tablet, Take 1 tablet by mouth 3 (Three) Times a Day As Needed for Muscle Spasms., Disp: 15 tablet, Rfl: 0  •  Dulaglutide (Trulicity) 1.5 MG/0.5ML solution pen-injector, Inject 1.5 mg under the skin into the appropriate area as directed 1 (One) Time Per Week., Disp: , Rfl:   •  glyburide (DIAbeta) 5 MG tablet, Take 5 mg by mouth Daily With Breakfast., Disp: , Rfl:   •  HYDROcodone-acetaminophen (NORCO) 5-325 MG per tablet, Take 1 tablet by mouth Every 6 (Six) Hours As Needed for moderate pain (4-6)., Disp: 12 tablet, Rfl: 0  •  HYDROcodone-acetaminophen (NORCO) 5-325 MG per tablet, Take 1 tablet by mouth Every 6 (Six) Hours As Needed for moderate pain (4-6)., Disp: 10 tablet, Rfl: 0  •  HYDROcodone-acetaminophen (NORCO) 5-325 MG per tablet, Take 1 tablet by mouth Every 6 (Six) Hours As Needed for Severe Pain ., Disp: 10 tablet, Rfl: 0  •  HYDROcodone-acetaminophen (NORCO) 7.5-325 MG per tablet, Take 1 tablet by mouth Every 8 (Eight) Hours As Needed for Moderate Pain (4-6)., Disp: 14 tablet, Rfl: 0  •  hydrOXYzine (ATARAX) 10 MG tablet, Take 10 mg by mouth 2 (Two) Times a Day As Needed. for anxiety, Disp: , Rfl:   •  ketorolac (TORADOL) 10 MG tablet, Take 1 tablet by mouth Every 6 (Six) Hours As Needed for Moderate Pain  or Severe  Pain ., Disp: 20 tablet, Rfl: 0  •  lisinopril 10 MG tablet 20 mg, hydroCHLOROthiazide 25 MG tablet 25 mg, Take  by mouth., Disp: , Rfl:   •  lisinopril-hydrochlorothiazide (PRINZIDE,ZESTORETIC) 20-25 MG per tablet, Take 1 tablet by mouth Daily., Disp: , Rfl:   •  meloxicam (MOBIC) 7.5 MG tablet, Take 1 tablet by mouth Daily., Disp: 14 tablet, Rfl: 0  •  methocarbamol (ROBAXIN) 750 MG tablet, Take 1 tablet by mouth 3 (Three) Times a Day., Disp: 21 tablet, Rfl: 0  •  MethylPREDNISolone (MEDROL, NYIAH,) 4 MG tablet, Take as directed on package instructions., Disp: 21 tablet, Rfl: 0  •  nitroglycerin (NITROSTAT) 0.4 MG SL tablet, Place 0.4 mg under the tongue Every 5 (Five) Minutes As Needed for Chest Pain. Take no more than 3 doses in 15 minutes., Disp: , Rfl:   •  nitroglycerin (NITROSTAT) 0.4 MG SL tablet, Place 0.4 mg under the tongue., Disp: , Rfl:   •  omeprazole (priLOSEC) 20 MG capsule, Take 1 capsule by mouth Daily., Disp: 30 capsule, Rfl: 0  •  ondansetron ODT (ZOFRAN-ODT) 4 MG disintegrating tablet, Take 1 tablet by mouth Every 6 (Six) Hours As Needed for Nausea or Vomiting., Disp: 10 tablet, Rfl: 0  •  ondansetron ODT (ZOFRAN-ODT) 4 MG disintegrating tablet, Take 1 tablet by mouth Every 6 (Six) Hours As Needed for Nausea or Vomiting., Disp: 12 tablet, Rfl: 0  •  ondansetron ODT (ZOFRAN-ODT) 4 MG disintegrating tablet, Place 1 tablet on the tongue Every 8 (Eight) Hours As Needed for Nausea., Disp: 12 tablet, Rfl: 0  •  pantoprazole (PROTONIX) 40 MG EC tablet, Take 40 mg by mouth Daily., Disp: , Rfl:   •  traMADol (ULTRAM) 50 MG tablet, Take 1 tablet by mouth Every 6 (Six) Hours As Needed for Moderate Pain ., Disp: 12 tablet, Rfl: 0  •  venlafaxine (EFFEXOR) 75 MG tablet, Take 75 mg by mouth Daily., Disp: , Rfl:   •  Ventolin  (90 Base) MCG/ACT inhaler, Inhale 1-2 puffs As Needed. every 4 to 6 hours, Disp: , Rfl:     OBJECTIVE:    Vital Signs:   Vitals:    06/28/21 0859   BP: 132/82   Pulse: 81   Temp:  "98.4 °F (36.9 °C)   SpO2: 99%   Weight: (!) 138 kg (304 lb)   Height: 185.4 cm (73\")       Physical Exam:   General Appearance:    Alert, cooperative, in no acute distress   Head:    Normocephalic, without obvious abnormality, atraumatic   Eyes:            Lids and lashes normal, conjunctivae and sclerae normal, no   icterus, no pallor, corneas clear, PERRL   Ears:    Ears appear intact with no abnormalities noted   Throat:   No oral lesions, no thrush, oral mucosa moist   Neck:   No adenopathy, supple, trachea midline, no thyromegaly,  no JVD   Lungs:     Clear to auscultation,respirations regular, even and                  unlabored    Heart:    Regular rhythm and normal rate, normal S1 and S2, no            murmur   Abdomen:     no masses, no organomegaly, soft non-tender, non-distended, no guarding, there is no evidence of tenderness, tender in the right upper quadrant to deep palpation   Extremities:   Moves all extremities well, no edema, no cyanosis, no             redness   Pulses:   Pulses palpable and equal bilaterally   Skin:   No bleeding, bruising or rash   Lymph nodes:   No palpable adenopathy   Neurologic:   Cranial nerves 2 - 12 grossly intact, sensation intact      Results Review:   I reviewed the patient's new clinical results.  I reviewed the patient's new imaging results and agree with the interpretation.  I reviewed the patient's other test results and agree with the interpretation    Review of Systems was reviewed and confirmed as accurate as documented by the MA.    ASSESSMENT/PLAN:    1. Right upper quadrant abdominal pain    2. Chronic diarrhea    3. Epigastric pain        I recommend an upper endoscopy for further evaluation. The procedure was explained as well as the risks which include but are not limited to bleeding, infection, perforation, abdominal pain etc. The patient understands these risks and the procedure and wishes to proceed.     I am also going to get his old HIDA scan and " then order him another HIDA scan at our institution, he may need a repeat colonoscopy with intubation of the terminal ileum in the future with pancolonic and small bowel biopsies.    Electronically signed by Arjun Delgadillo MD  06/28/21 09:11 EDT

## 2021-06-30 PROBLEM — R10.13 EPIGASTRIC PAIN: Status: ACTIVE | Noted: 2021-06-30

## 2021-07-03 ENCOUNTER — LAB (OUTPATIENT)
Dept: LAB | Facility: HOSPITAL | Age: 51
End: 2021-07-03

## 2021-07-03 DIAGNOSIS — Z01.818 PRE-OP TESTING: ICD-10-CM

## 2021-07-03 LAB — SARS-COV-2 RNA NOSE QL NAA+PROBE: NOT DETECTED

## 2021-07-03 PROCEDURE — U0004 COV-19 TEST NON-CDC HGH THRU: HCPCS

## 2021-07-03 PROCEDURE — C9803 HOPD COVID-19 SPEC COLLECT: HCPCS

## 2021-07-06 NOTE — PRE-PROCEDURE INSTRUCTIONS
PAT phone history completed with pt for upcoming procedure on 7/7/21 with Dr. Delgadillo.     PAT PASS GIVEN/REVIEWED WITH PT.  VERBALIZED UNDERSTANDING OF THE FOLLOWING:  DO NOT EAT, DRINK, SMOKE, USE SMOKELESS TOBACCO OR CHEW GUM AFTER MIDNIGHT THE NIGHT BEFORE SURGERY.  THIS ALSO INCLUDES HARD CANDIES AND MINTS.    DO NOT SHAVE THE AREA TO BE OPERATED ON AT LEAST 48 HOURS PRIOR TO THE PROCEDURE.  DO NOT WEAR MAKE UP OR NAIL POLISH.  DO NOT LEAVE IN ANY PIERCING OR WEAR JEWELRY THE DAY OF SURGERY.      DO NOT USE ADHESIVES IF YOU WEAR DENTURES.    DO NOT WEAR EYE CONTACTS; BRING IN YOUR GLASSES.    ONLY TAKE MEDICATION THE MORNING OF YOUR PROCEDURE IF INSTRUCTED BY YOUR SURGEON WITH ENOUGH WATER TO SWALLOW THE MEDICATION.  IF YOUR SURGEON DID NOT SPECIFY WHICH MEDICATIONS TO TAKE, YOU WILL NEED TO CALL THEIR OFFICE FOR FURTHER INSTRUCTIONS AND DO AS THEY INSTRUCT.    LEAVE ANYTHING YOU CONSIDER VALUABLE AT HOME.    YOU WILL NEED TO ARRANGE FOR SOMEONE TO DRIVE YOU HOME AFTER SURGERY.  IT IS RECOMMENDED THAT YOU DO NOT DRIVE, WORK, DRINK ALCOHOL OR MAKE MAJOR DECISIONS FOR AT LEAST 24 HOURS AFTER YOUR PROCEDURE IS COMPLETE.      THE DAY OF YOUR PROCEDURE, BRING IN THE FOLLOWING IF APPLICABLE:   PICTURE ID AND INSURANCE/MEDICARE OR MEDICAID CARDS/ANY CO-PAY THAT MAY BE DUE   COPY OF ADVANCED DIRECTIVE/LIVING WILL/POWER OR    CPAP/BIPAP/INHALERS   SKIN PREP SHEET   YOUR PREADMISSION TESTING PASS (IF NOT A PHONE HISTORY)    COVID self-quarantine instructions reviewed with the pt.  Verbalized understanding.

## 2021-07-07 ENCOUNTER — ANESTHESIA (OUTPATIENT)
Dept: GASTROENTEROLOGY | Facility: HOSPITAL | Age: 51
End: 2021-07-07

## 2021-07-07 ENCOUNTER — ANESTHESIA EVENT (OUTPATIENT)
Dept: GASTROENTEROLOGY | Facility: HOSPITAL | Age: 51
End: 2021-07-07

## 2021-07-07 ENCOUNTER — HOSPITAL ENCOUNTER (OUTPATIENT)
Facility: HOSPITAL | Age: 51
Setting detail: HOSPITAL OUTPATIENT SURGERY
Discharge: HOME | End: 2021-07-07
Attending: SURGERY | Admitting: SURGERY

## 2021-07-07 VITALS
HEART RATE: 68 BPM | HEIGHT: 73 IN | RESPIRATION RATE: 16 BRPM | TEMPERATURE: 98.3 F | SYSTOLIC BLOOD PRESSURE: 117 MMHG | OXYGEN SATURATION: 96 % | DIASTOLIC BLOOD PRESSURE: 78 MMHG | WEIGHT: 304 LBS | BODY MASS INDEX: 40.29 KG/M2

## 2021-07-07 DIAGNOSIS — R10.13 EPIGASTRIC PAIN: ICD-10-CM

## 2021-07-07 LAB — GLUCOSE BLDC GLUCOMTR-MCNC: 251 MG/DL (ref 70–130)

## 2021-07-07 PROCEDURE — 82962 GLUCOSE BLOOD TEST: CPT

## 2021-07-07 PROCEDURE — 43239 EGD BIOPSY SINGLE/MULTIPLE: CPT | Performed by: SURGERY

## 2021-07-07 PROCEDURE — 25010000002 PROPOFOL 200 MG/20ML EMULSION: Performed by: NURSE ANESTHETIST, CERTIFIED REGISTERED

## 2021-07-07 RX ORDER — SODIUM CHLORIDE, SODIUM LACTATE, POTASSIUM CHLORIDE, CALCIUM CHLORIDE 600; 310; 30; 20 MG/100ML; MG/100ML; MG/100ML; MG/100ML
1000 INJECTION, SOLUTION INTRAVENOUS CONTINUOUS
Status: DISCONTINUED | OUTPATIENT
Start: 2021-07-07 | End: 2021-07-07 | Stop reason: HOSPADM

## 2021-07-07 RX ORDER — LIDOCAINE HYDROCHLORIDE 20 MG/ML
INJECTION, SOLUTION INTRAVENOUS AS NEEDED
Status: DISCONTINUED | OUTPATIENT
Start: 2021-07-07 | End: 2021-07-07 | Stop reason: SURG

## 2021-07-07 RX ORDER — PROPOFOL 10 MG/ML
INJECTION, EMULSION INTRAVENOUS AS NEEDED
Status: DISCONTINUED | OUTPATIENT
Start: 2021-07-07 | End: 2021-07-07 | Stop reason: SURG

## 2021-07-07 RX ADMIN — PROPOFOL 100 MG: 10 INJECTION, EMULSION INTRAVENOUS at 09:10

## 2021-07-07 RX ADMIN — SODIUM CHLORIDE, POTASSIUM CHLORIDE, SODIUM LACTATE AND CALCIUM CHLORIDE 1000 ML: 600; 310; 30; 20 INJECTION, SOLUTION INTRAVENOUS at 08:07

## 2021-07-07 RX ADMIN — LIDOCAINE HYDROCHLORIDE 100 MG: 20 INJECTION, SOLUTION INTRAVENOUS at 09:10

## 2021-07-07 NOTE — NURSING NOTE
Ok to go ahead with finger stick of 257 per Ammon Negrete CRNA.  Ok to move forward with procedure with Plavix and  mg taken at 0630 this am per Dr. Delgadillo.

## 2021-07-07 NOTE — ANESTHESIA PREPROCEDURE EVALUATION
Anesthesia Evaluation     Patient summary reviewed and Nursing notes reviewed   NPO Solid Status: > 8 hours  NPO Liquid Status: > 8 hours           Airway   Mallampati: II  TM distance: >3 FB  Neck ROM: limited  Possible difficult intubation  Dental - normal exam     Pulmonary - normal exam   (+) asthma,sleep apnea on CPAP,   Cardiovascular - normal exam    (+) hypertension well controlled 2 medications or greater, past MI  6-12 months,       Neuro/Psych  (+) psychiatric history Anxiety and Depression,     GI/Hepatic/Renal/Endo    (+) morbid obesity,  liver disease fatty liver disease, renal disease stones, diabetes mellitus type 2,     Musculoskeletal     (+) back pain,   Abdominal  - normal exam   Substance History      OB/GYN          Other                      Anesthesia Plan    ASA 4     MAC     intravenous induction     Anesthetic plan, all risks, benefits, and alternatives have been provided, discussed and informed consent has been obtained with: patient.    Plan discussed with CRNA.

## 2021-07-09 LAB
LAB AP CASE REPORT: NORMAL
PATH REPORT.FINAL DX SPEC: NORMAL

## 2021-07-16 ENCOUNTER — APPOINTMENT (OUTPATIENT)
Dept: NUCLEAR MEDICINE | Facility: HOSPITAL | Age: 51
End: 2021-07-16

## 2021-07-21 ENCOUNTER — HOSPITAL ENCOUNTER (OUTPATIENT)
Dept: NUCLEAR MEDICINE | Facility: HOSPITAL | Age: 51
Discharge: HOME | End: 2021-07-21

## 2021-07-21 DIAGNOSIS — R10.11 RIGHT UPPER QUADRANT ABDOMINAL PAIN: ICD-10-CM

## 2021-07-21 PROCEDURE — 78227 HEPATOBIL SYST IMAGE W/DRUG: CPT

## 2021-07-21 PROCEDURE — A9537 TC99M MEBROFENIN: HCPCS | Performed by: SURGERY

## 2021-07-21 PROCEDURE — 25010000002 SINCALIDE PER 5 MCG: Performed by: SURGERY

## 2021-07-21 PROCEDURE — 0 TECHNETIUM TC 99M MEBROFENIN KIT: Performed by: SURGERY

## 2021-07-21 RX ORDER — KIT FOR THE PREPARATION OF TECHNETIUM TC 99M MEBROFENIN 45 MG/10ML
1 INJECTION, POWDER, LYOPHILIZED, FOR SOLUTION INTRAVENOUS
Status: COMPLETED | OUTPATIENT
Start: 2021-07-21 | End: 2021-07-21

## 2021-07-21 RX ADMIN — MEBROFENIN 1 DOSE: 45 INJECTION, POWDER, LYOPHILIZED, FOR SOLUTION INTRAVENOUS at 10:59

## 2021-07-21 RX ADMIN — SINCALIDE 2.8 MCG: 5 INJECTION, POWDER, LYOPHILIZED, FOR SOLUTION INTRAVENOUS at 11:40

## 2021-07-27 ENCOUNTER — TELEPHONE (OUTPATIENT)
Dept: SURGERY | Facility: CLINIC | Age: 51
End: 2021-07-27

## 2021-07-27 NOTE — TELEPHONE ENCOUNTER
Patient didn't show for his appointment . Called him and he said he thought it was tomorrow . Rescheduled him for tomorrow.

## 2021-07-28 ENCOUNTER — APPOINTMENT (OUTPATIENT)
Dept: NUCLEAR MEDICINE | Facility: HOSPITAL | Age: 51
End: 2021-07-28

## 2021-07-28 ENCOUNTER — OFFICE VISIT (OUTPATIENT)
Dept: SURGERY | Facility: CLINIC | Age: 51
End: 2021-07-28

## 2021-07-28 VITALS
WEIGHT: 297 LBS | TEMPERATURE: 98 F | SYSTOLIC BLOOD PRESSURE: 140 MMHG | BODY MASS INDEX: 39.36 KG/M2 | DIASTOLIC BLOOD PRESSURE: 96 MMHG | HEIGHT: 73 IN | OXYGEN SATURATION: 98 % | HEART RATE: 62 BPM

## 2021-07-28 DIAGNOSIS — K82.8 BILIARY DYSKINESIA: ICD-10-CM

## 2021-07-28 DIAGNOSIS — Z01.818 PRE-OP TESTING: Primary | ICD-10-CM

## 2021-07-28 DIAGNOSIS — R10.13 EPIGASTRIC PAIN: Primary | ICD-10-CM

## 2021-07-28 PROCEDURE — 99213 OFFICE O/P EST LOW 20 MIN: CPT | Performed by: SURGERY

## 2021-07-28 NOTE — PROGRESS NOTES
Patient: Kb Minaya    YOB: 1970    Date: 07/28/2021    Primary Care Provider: Vilma Cassidy MD    Chief Complaint   Patient presents with   • Follow-up     EGD        SUBJECTIVE:    History of present illness:  I saw the patient in the office today as a follow-up consultation for recent EGD with biopsy.  Pathology report showed minimal chronic gastritis and intestinal metaplasia.  Patient is also here for follow-up hida scan which was performed 07/21/202, it showed an ejection fraction of 0%.  Patient continues to complain of nausea, diarrhea, abdominal pain and loss of appetite.     He continues to have right upper quadrant midepigastric abdominal discomfort associated with diarrhea, worse after fatty meals, sharp and crampy in nature, can be radiating into the back, can be associated with nausea, present over the past several years.    The patient has had a light heart attack last year but was treated on nonoperative noninterventional manner.  He has seen his cardiologist in February and was told there were no cardiac issues.    The following portions of the patient's history were reviewed and updated as appropriate: allergies, current medications, past family history, past medical history, past social history, past surgical history and problem list.    Review of Systems   Constitutional: Positive for appetite change and unexpected weight change. Negative for chills and fever.   HENT: Negative for trouble swallowing and voice change.    Eyes: Negative for visual disturbance.   Respiratory: Negative for apnea, cough, chest tightness, shortness of breath and wheezing.    Cardiovascular: Negative for chest pain, palpitations and leg swelling.   Gastrointestinal: Positive for abdominal pain, diarrhea and nausea. Negative for abdominal distention, anal bleeding, blood in stool, constipation, rectal pain and vomiting.   Endocrine: Negative for cold intolerance and heat intolerance.  "  Genitourinary: Negative for difficulty urinating, dysuria, flank pain, scrotal swelling and testicular pain.   Musculoskeletal: Negative for back pain, gait problem and joint swelling.   Skin: Negative for color change, rash and wound.   Neurological: Negative for dizziness, syncope, speech difficulty, weakness, numbness and headaches.   Hematological: Negative for adenopathy. Does not bruise/bleed easily.   Psychiatric/Behavioral: Negative for confusion. The patient is not nervous/anxious.        History:  Past Medical History:   Diagnosis Date   • Anxiety and depression    • Asthma    • Back pain    • Blood clot in vein 01/2021    right leg-states has resolved now   • CKD (chronic kidney disease)     pt denies 7/6/21   • Diabetes mellitus (CMS/HCC)    • Fatty liver    • History of nuclear stress test 01/2020    \"I did ok with it\"   • Hypertension    • Kidney stone    • Myocardial infarction (CMS/HCC)     MI IN 2006 (stent x 1), 2012 (no stents), 8/2/2020 (no stents with last MI)   • Pilonidal cyst    • Plantar fasciitis     bilateral   • Sleep apnea     CPAP-\"I don't wear it every night\"   • Wears glasses        Past Surgical History:   Procedure Laterality Date   • APPENDECTOMY     • CARDIAC CATHETERIZATION  2006    stent x 1   • CARDIAC CATHETERIZATION  2012    no stents   • COLONOSCOPY     • ENDOSCOPY     • ENDOSCOPY N/A 7/7/2021    Procedure: ESOPHAGOGASTRODUODENOSCOPY WITH BOPSIES;  Surgeon: Arjun Delgadillo MD;  Location: [facility] ENDOSCOPY;  Service: Gastroenterology;  Laterality: N/A;   • PLANTAR FASCIA RELEASE      right   • SKIN BIOPSY      benign       History reviewed. No pertinent family history.    Social History     Tobacco Use   • Smoking status: Never Smoker   • Smokeless tobacco: Never Used   Vaping Use   • Vaping Use: Never used   Substance Use Topics   • Alcohol use: Yes     Comment: maybe 1 drink per year (rarely)   • Drug use: No       Allergies:  Allergies   Allergen Reactions   • Morphine " And Related Anaphylaxis   • Simvastatin Other (See Comments)     Liver problems   • Penicillins Hives   • Pradaxa [Dabigatran Etexilate Mesylate] Hives       Medications:    Current Outpatient Medications:   •  aspirin 325 MG tablet, Take 325 mg by mouth Daily., Disp: , Rfl:   •  carvedilol (COREG) 6.25 MG tablet, Take 6.25 mg by mouth 2 (Two) Times a Day With Meals., Disp: , Rfl:   •  cilostazol (PLETAL) 50 MG tablet, TAKE 1 TABLET BY MOUTH TWICE DAILY 30 MINUTES BEFORE OR 2 HOURS AFTER BREAKFAST AND DINNER, Disp: , Rfl:   •  clopidogrel (PLAVIX) 75 MG tablet, Take 75 mg by mouth Daily., Disp: , Rfl:   •  Dulaglutide (Trulicity) 1.5 MG/0.5ML solution pen-injector, Inject 1.5 mg under the skin into the appropriate area as directed 1 (One) Time Per Week., Disp: , Rfl:   •  glyburide (DIAbeta) 5 MG tablet, Take 10 mg by mouth 2 (Two) Times a Day With Meals., Disp: , Rfl:   •  HYDROcodone-acetaminophen (NORCO) 5-325 MG per tablet, Take 1 tablet by mouth Every 6 (Six) Hours As Needed for Severe Pain ., Disp: 10 tablet, Rfl: 0  •  hydrOXYzine (ATARAX) 10 MG tablet, Take 10 mg by mouth 2 (Two) Times a Day As Needed. for anxiety, Disp: , Rfl:   •  ketorolac (TORADOL) 10 MG tablet, Take 1 tablet by mouth Every 6 (Six) Hours As Needed for Moderate Pain  or Severe Pain ., Disp: 20 tablet, Rfl: 0  •  lisinopril-hydrochlorothiazide (PRINZIDE,ZESTORETIC) 20-25 MG per tablet, Take 1 tablet by mouth Daily., Disp: , Rfl:   •  meloxicam (MOBIC) 7.5 MG tablet, Take 1 tablet by mouth Daily., Disp: 14 tablet, Rfl: 0  •  methocarbamol (ROBAXIN) 750 MG tablet, Take 1 tablet by mouth 3 (Three) Times a Day., Disp: 21 tablet, Rfl: 0  •  MethylPREDNISolone (MEDROL, NIYAH,) 4 MG tablet, Take as directed on package instructions., Disp: 21 tablet, Rfl: 0  •  nitroglycerin (NITROSTAT) 0.4 MG SL tablet, Place 0.4 mg under the tongue., Disp: , Rfl:   •  ondansetron ODT (ZOFRAN-ODT) 4 MG disintegrating tablet, Place 1 tablet on the tongue Every 8  "(Eight) Hours As Needed for Nausea., Disp: 12 tablet, Rfl: 0  •  pantoprazole (PROTONIX) 40 MG EC tablet, Take 40 mg by mouth Daily., Disp: , Rfl:   •  venlafaxine (EFFEXOR) 75 MG tablet, Take 75 mg by mouth Daily., Disp: , Rfl:   •  Ventolin  (90 Base) MCG/ACT inhaler, Inhale 1-2 puffs As Needed. every 4 to 6 hours, Disp: , Rfl:     OBJECTIVE:    Vital Signs:   Vitals:    07/28/21 1446   BP: 140/96   Pulse: 62   Temp: 98 °F (36.7 °C)   TempSrc: Temporal   SpO2: 98%   Weight: 135 kg (297 lb)   Height: 185.4 cm (73\")       Physical Exam:   General Appearance:    Alert, cooperative, in no acute distress   Head:    Normocephalic, without obvious abnormality, atraumatic   Eyes:            Lids and lashes normal, conjunctivae and sclerae normal, no   icterus, no pallor, corneas clear, PERRLA   Ears:    Ears appear intact with no abnormalities noted   Throat:   No oral lesions, no thrush, oral mucosa moist   Neck:   No adenopathy, supple, trachea midline, no thyromegaly, no   carotid bruit, no JVD   Lungs:     Clear to auscultation,respirations regular, even and                  unlabored    Heart:    Regular rhythm and normal rate, normal S1 and S2, no            murmur   Abdomen:     no masses, no organomegaly, soft non-tender, non-distended, no guarding, there is evidence of right upper quadrant tenderness, no peritoneal signs   Extremities:   Moves all extremities well, no edema, no cyanosis, no             redness   Pulses:   Pulses palpable and equal bilaterally   Skin:   No bleeding, bruising or rash   Lymph nodes:   No palpable adenopathy   Neurologic:   Cranial nerves 2 - 12 grossly intact, sensation intact      Results Review:   I reviewed the patient's new clinical results.  I reviewed the patient's new imaging results and agree with the interpretation.  I reviewed the patient's other test results and agree with the interpretation    Review of Systems was reviewed and confirmed as accurate as documented " by the MA.    ASSESSMENT/PLAN:    1. Epigastric pain    2. Biliary dyskinesia        I had a detailed and extensive discussion with the patient in the office and they understand that they need to undergo laparoscopic cholecystectomy with intraoperative cholangiography or possible open cholecystectomy. Full risks and benefits of operative versus nonoperative intervention were discussed with the patient and these included things such as nonresolution of symptoms and possible worsening of symptoms without surgical intervention versus infection, bleeding, open cholecystectomy, common bile duct injury, postoperative biliary leakage, need for drain placement, possible inability to perform cholangiography due to inflammation, postoperative abscess, etc with surgical intervention. The patient understands, agrees, and wishes to proceed with the surgical treatment plan as mentioned above. The patient had no questions for me at the end of the discussion.  I did draw a picture of the anatomy for the patient and used this in my informed consent.     I discussed the patients findings and my recommendations with patient.    Electronically signed by Arjun Delgadillo MD  07/28/21

## 2021-08-04 ENCOUNTER — TRANSCRIBE ORDERS (OUTPATIENT)
Dept: ADMINISTRATIVE | Facility: HOSPITAL | Age: 51
End: 2021-08-04

## 2021-08-04 DIAGNOSIS — M54.2 CERVICALGIA: ICD-10-CM

## 2021-08-04 DIAGNOSIS — M54.16 LUMBAR RADICULOPATHY: Primary | ICD-10-CM

## 2021-08-07 ENCOUNTER — LAB (OUTPATIENT)
Dept: LAB | Facility: HOSPITAL | Age: 51
End: 2021-08-07

## 2021-08-07 DIAGNOSIS — Z01.818 PRE-OP TESTING: ICD-10-CM

## 2021-08-07 LAB — SARS-COV-2 RNA PNL SPEC NAA+PROBE: NOT DETECTED

## 2021-08-07 PROCEDURE — C9803 HOPD COVID-19 SPEC COLLECT: HCPCS

## 2021-08-07 PROCEDURE — U0004 COV-19 TEST NON-CDC HGH THRU: HCPCS

## 2021-08-10 ENCOUNTER — OUTSIDE FACILITY SERVICE (OUTPATIENT)
Dept: SURGERY | Facility: CLINIC | Age: 51
End: 2021-08-10

## 2021-08-10 DIAGNOSIS — K82.8 BILIARY DYSKINESIA: Primary | ICD-10-CM

## 2021-08-10 PROCEDURE — 47379 UNLISTED LAPS PX LIVER: CPT | Performed by: SURGERY

## 2021-08-10 PROCEDURE — 47563 LAPARO CHOLECYSTECTOMY/GRAPH: CPT | Performed by: SURGERY

## 2021-08-10 RX ORDER — HYDROCODONE BITARTRATE AND ACETAMINOPHEN 7.5; 325 MG/1; MG/1
1 TABLET ORAL EVERY 6 HOURS PRN
Qty: 15 TABLET | Refills: 0 | Status: SHIPPED | OUTPATIENT
Start: 2021-08-10 | End: 2021-08-23 | Stop reason: HOSPADM

## 2021-08-18 NOTE — PROGRESS NOTES
"Patient: Kb Minaya    YOB: 1970    Date: 08/23/2021    Primary Care Provider: Vilma Cassidy MD    Chief Complaint   Patient presents with   • Post-op     lap peter       History of present illness:  I saw the patient in the office today as a followup from their recent laparoscopic cholecystectomy and liver wedge biopsy which was done 08/10/2021.  Gallbladder pathology report showed chronic cholecystitis focal cholesterolosis and fragment of cirrhotic liver with steatohepatitis.  Liver wedge pathology report showed steatohepatitis, grade 2 of 3, stage 4 of 4 cirrhosis.  They state that they have done well and are having no complaints.    The patient did have biliary dyskinesia and states that he feels markedly better after surgical intervention with laparoscopic cholecystectomy.  At the time of laparoscopic cholecystectomy it was noted that the patient did have cirrhosis, wedge biopsy of the liver was performed that showed evidence of steatohepatitis as mentioned above.    His HIDA scan showed evidence of a 0% gallbladder ejection fraction preoperatively.    Vital Signs:   Vitals:    08/23/21 0830   BP: 126/78   Pulse: 82   Temp: 97.8 °F (36.6 °C)   SpO2: 98%   Weight: 135 kg (297 lb 9.9 oz)   Height: 185.4 cm (72.99\")       Physical Exam:   General Appearance:    Alert, cooperative, in no acute distress   Abdomen:     no masses, no organomegaly, soft non-tender, non-distended, no guarding, wounds are well healed     Assessment / Plan :    1. Postoperative visit    2. Steatohepatitis        I did discuss the situation with the patient today in the office and they have done well from their recent laparoscopic cholecystectomy and liver wedge biopsy.  I have released the patient back to normal activity, they understand that they need to be careful about heavy lifting.  I need to see the patient back in the office only if they are having further problems, they know to call me if they " are.    I have set him up to see Dr. Bell of the GI service for further evaluation and management of his chronic steatohepatitis which was fairly well advanced.  He does not have a history significant for alcohol intake.    Electronically signed by Arjun Delgadillo MD  08/23/21        Portions of this note has been scribed for Arjun Delgadillo MD by Peggy Ramos. 8/23/2021  09:42 EDT

## 2021-08-23 ENCOUNTER — OFFICE VISIT (OUTPATIENT)
Dept: SURGERY | Facility: CLINIC | Age: 51
End: 2021-08-23

## 2021-08-23 VITALS
BODY MASS INDEX: 39.44 KG/M2 | OXYGEN SATURATION: 98 % | HEIGHT: 73 IN | WEIGHT: 297.62 LBS | TEMPERATURE: 97.8 F | SYSTOLIC BLOOD PRESSURE: 126 MMHG | HEART RATE: 82 BPM | DIASTOLIC BLOOD PRESSURE: 78 MMHG

## 2021-08-23 DIAGNOSIS — Z48.89 POSTOPERATIVE VISIT: Primary | ICD-10-CM

## 2021-08-23 DIAGNOSIS — K75.81 STEATOHEPATITIS: Primary | ICD-10-CM

## 2021-08-23 DIAGNOSIS — K75.81 STEATOHEPATITIS: ICD-10-CM

## 2021-08-23 PROCEDURE — 99024 POSTOP FOLLOW-UP VISIT: CPT | Performed by: SURGERY

## 2021-08-27 ENCOUNTER — HOSPITAL ENCOUNTER (OUTPATIENT)
Dept: MRI IMAGING | Facility: HOSPITAL | Age: 51
Discharge: HOME | End: 2021-08-27

## 2021-08-27 DIAGNOSIS — M54.16 LUMBAR RADICULOPATHY: ICD-10-CM

## 2021-08-27 DIAGNOSIS — M54.2 CERVICALGIA: ICD-10-CM

## 2021-08-27 PROCEDURE — 72148 MRI LUMBAR SPINE W/O DYE: CPT

## 2021-08-27 PROCEDURE — 72141 MRI NECK SPINE W/O DYE: CPT

## 2021-08-29 ENCOUNTER — HOSPITAL ENCOUNTER (EMERGENCY)
Facility: HOSPITAL | Age: 51
Discharge: HOME | End: 2021-08-30
Attending: EMERGENCY MEDICINE | Admitting: EMERGENCY MEDICINE

## 2021-08-29 ENCOUNTER — APPOINTMENT (OUTPATIENT)
Dept: GENERAL RADIOLOGY | Facility: HOSPITAL | Age: 51
End: 2021-08-29

## 2021-08-29 ENCOUNTER — APPOINTMENT (OUTPATIENT)
Dept: CT IMAGING | Facility: HOSPITAL | Age: 51
End: 2021-08-29

## 2021-08-29 DIAGNOSIS — S40.012A CONTUSION OF LEFT SHOULDER, INITIAL ENCOUNTER: ICD-10-CM

## 2021-08-29 DIAGNOSIS — S50.02XA CONTUSION OF LEFT ELBOW, INITIAL ENCOUNTER: ICD-10-CM

## 2021-08-29 DIAGNOSIS — R55 SYNCOPE, UNSPECIFIED SYNCOPE TYPE: Primary | ICD-10-CM

## 2021-08-29 LAB
ALBUMIN SERPL-MCNC: 3.6 G/DL (ref 3.5–5.2)
ALBUMIN/GLOB SERPL: 1.1 G/DL
ALP SERPL-CCNC: 132 U/L (ref 39–117)
ALT SERPL W P-5'-P-CCNC: 81 U/L (ref 1–41)
ANION GAP SERPL CALCULATED.3IONS-SCNC: 12.4 MMOL/L (ref 5–15)
AST SERPL-CCNC: 46 U/L (ref 1–40)
BASOPHILS # BLD AUTO: 0.03 10*3/MM3 (ref 0–0.2)
BASOPHILS NFR BLD AUTO: 0.4 % (ref 0–1.5)
BILIRUB SERPL-MCNC: 0.5 MG/DL (ref 0–1.2)
BUN SERPL-MCNC: 13 MG/DL (ref 6–20)
BUN/CREAT SERPL: 13.7 (ref 7–25)
CALCIUM SPEC-SCNC: 9.3 MG/DL (ref 8.6–10.5)
CHLORIDE SERPL-SCNC: 100 MMOL/L (ref 98–107)
CO2 SERPL-SCNC: 22.6 MMOL/L (ref 22–29)
CREAT SERPL-MCNC: 0.95 MG/DL (ref 0.76–1.27)
DEPRECATED RDW RBC AUTO: 40.2 FL (ref 37–54)
EOSINOPHIL # BLD AUTO: 0.08 10*3/MM3 (ref 0–0.4)
EOSINOPHIL NFR BLD AUTO: 1.2 % (ref 0.3–6.2)
ERYTHROCYTE [DISTWIDTH] IN BLOOD BY AUTOMATED COUNT: 12.5 % (ref 12.3–15.4)
GFR SERPL CREATININE-BSD FRML MDRD: 83 ML/MIN/1.73
GLOBULIN UR ELPH-MCNC: 3.4 GM/DL
GLUCOSE SERPL-MCNC: 258 MG/DL (ref 65–99)
HCT VFR BLD AUTO: 46.2 % (ref 37.5–51)
HGB BLD-MCNC: 16.1 G/DL (ref 13–17.7)
HOLD SPECIMEN: NORMAL
HOLD SPECIMEN: NORMAL
IMM GRANULOCYTES # BLD AUTO: 0.02 10*3/MM3 (ref 0–0.05)
IMM GRANULOCYTES NFR BLD AUTO: 0.3 % (ref 0–0.5)
LYMPHOCYTES # BLD AUTO: 1.85 10*3/MM3 (ref 0.7–3.1)
LYMPHOCYTES NFR BLD AUTO: 26.9 % (ref 19.6–45.3)
MAGNESIUM SERPL-MCNC: 1.8 MG/DL (ref 1.6–2.6)
MCH RBC QN AUTO: 30.7 PG (ref 26.6–33)
MCHC RBC AUTO-ENTMCNC: 34.8 G/DL (ref 31.5–35.7)
MCV RBC AUTO: 88.2 FL (ref 79–97)
MONOCYTES # BLD AUTO: 0.52 10*3/MM3 (ref 0.1–0.9)
MONOCYTES NFR BLD AUTO: 7.6 % (ref 5–12)
NEUTROPHILS NFR BLD AUTO: 4.38 10*3/MM3 (ref 1.7–7)
NEUTROPHILS NFR BLD AUTO: 63.6 % (ref 42.7–76)
NRBC BLD AUTO-RTO: 0 /100 WBC (ref 0–0.2)
PLATELET # BLD AUTO: 159 10*3/MM3 (ref 140–450)
PMV BLD AUTO: 10.8 FL (ref 6–12)
POTASSIUM SERPL-SCNC: 4.1 MMOL/L (ref 3.5–5.2)
PROT SERPL-MCNC: 7 G/DL (ref 6–8.5)
RBC # BLD AUTO: 5.24 10*6/MM3 (ref 4.14–5.8)
SODIUM SERPL-SCNC: 135 MMOL/L (ref 136–145)
TROPONIN T SERPL-MCNC: <0.01 NG/ML (ref 0–0.03)
WBC # BLD AUTO: 6.88 10*3/MM3 (ref 3.4–10.8)
WHOLE BLOOD HOLD SPECIMEN: NORMAL
WHOLE BLOOD HOLD SPECIMEN: NORMAL

## 2021-08-29 PROCEDURE — 80053 COMPREHEN METABOLIC PANEL: CPT | Performed by: EMERGENCY MEDICINE

## 2021-08-29 PROCEDURE — 93005 ELECTROCARDIOGRAM TRACING: CPT | Performed by: EMERGENCY MEDICINE

## 2021-08-29 PROCEDURE — 71046 X-RAY EXAM CHEST 2 VIEWS: CPT

## 2021-08-29 PROCEDURE — 85025 COMPLETE CBC W/AUTO DIFF WBC: CPT | Performed by: EMERGENCY MEDICINE

## 2021-08-29 PROCEDURE — 73030 X-RAY EXAM OF SHOULDER: CPT

## 2021-08-29 PROCEDURE — 70450 CT HEAD/BRAIN W/O DYE: CPT

## 2021-08-29 PROCEDURE — 83735 ASSAY OF MAGNESIUM: CPT | Performed by: EMERGENCY MEDICINE

## 2021-08-29 PROCEDURE — 73080 X-RAY EXAM OF ELBOW: CPT

## 2021-08-29 PROCEDURE — 99283 EMERGENCY DEPT VISIT LOW MDM: CPT

## 2021-08-29 PROCEDURE — 84484 ASSAY OF TROPONIN QUANT: CPT | Performed by: EMERGENCY MEDICINE

## 2021-08-29 RX ORDER — SODIUM CHLORIDE 0.9 % (FLUSH) 0.9 %
10 SYRINGE (ML) INJECTION AS NEEDED
Status: DISCONTINUED | OUTPATIENT
Start: 2021-08-29 | End: 2021-08-30 | Stop reason: HOSPADM

## 2021-08-30 ENCOUNTER — OFFICE VISIT (OUTPATIENT)
Dept: GASTROENTEROLOGY | Facility: CLINIC | Age: 51
End: 2021-08-30

## 2021-08-30 VITALS
TEMPERATURE: 97.1 F | DIASTOLIC BLOOD PRESSURE: 82 MMHG | WEIGHT: 295.8 LBS | BODY MASS INDEX: 39.2 KG/M2 | SYSTOLIC BLOOD PRESSURE: 110 MMHG | HEIGHT: 73 IN

## 2021-08-30 VITALS
TEMPERATURE: 98.6 F | HEIGHT: 73 IN | BODY MASS INDEX: 38.17 KG/M2 | DIASTOLIC BLOOD PRESSURE: 94 MMHG | SYSTOLIC BLOOD PRESSURE: 151 MMHG | HEART RATE: 85 BPM | RESPIRATION RATE: 17 BRPM | WEIGHT: 288 LBS | OXYGEN SATURATION: 96 %

## 2021-08-30 DIAGNOSIS — R11.0 NAUSEA: ICD-10-CM

## 2021-08-30 DIAGNOSIS — K21.9 GASTROESOPHAGEAL REFLUX DISEASE WITHOUT ESOPHAGITIS: ICD-10-CM

## 2021-08-30 DIAGNOSIS — D69.6 ACQUIRED THROMBOCYTOPENIA (HCC): ICD-10-CM

## 2021-08-30 DIAGNOSIS — R10.31 RIGHT LOWER QUADRANT ABDOMINAL PAIN: ICD-10-CM

## 2021-08-30 DIAGNOSIS — R79.89 ELEVATED LFTS: ICD-10-CM

## 2021-08-30 DIAGNOSIS — E66.09 CLASS 2 OBESITY DUE TO EXCESS CALORIES WITH BODY MASS INDEX (BMI) OF 39.0 TO 39.9 IN ADULT, UNSPECIFIED WHETHER SERIOUS COMORBIDITY PRESENT: ICD-10-CM

## 2021-08-30 DIAGNOSIS — Z86.010 PERSONAL HISTORY OF COLONIC POLYPS: ICD-10-CM

## 2021-08-30 DIAGNOSIS — K75.81 NASH (NONALCOHOLIC STEATOHEPATITIS): ICD-10-CM

## 2021-08-30 DIAGNOSIS — R19.4 CHANGE IN BOWEL HABITS: ICD-10-CM

## 2021-08-30 DIAGNOSIS — K74.60 CIRRHOSIS OF LIVER WITHOUT ASCITES, UNSPECIFIED HEPATIC CIRRHOSIS TYPE (HCC): Primary | ICD-10-CM

## 2021-08-30 DIAGNOSIS — R10.11 RIGHT UPPER QUADRANT ABDOMINAL PAIN: ICD-10-CM

## 2021-08-30 PROCEDURE — 99204 OFFICE O/P NEW MOD 45 MIN: CPT | Performed by: INTERNAL MEDICINE

## 2021-08-30 RX ORDER — SODIUM CHLORIDE 9 MG/ML
70 INJECTION, SOLUTION INTRAVENOUS CONTINUOUS PRN
Status: CANCELLED | OUTPATIENT
Start: 2021-08-30

## 2021-08-30 RX ORDER — VENLAFAXINE HYDROCHLORIDE 150 MG/1
150 CAPSULE, EXTENDED RELEASE ORAL DAILY
COMMUNITY
End: 2022-11-14

## 2021-08-30 RX ORDER — SODIUM, POTASSIUM,MAG SULFATES 17.5-3.13G
2 SOLUTION, RECONSTITUTED, ORAL ORAL ONCE
Qty: 354 ML | Refills: 0 | Status: SHIPPED | OUTPATIENT
Start: 2021-08-30 | End: 2021-08-30

## 2021-08-30 RX ORDER — DICYCLOMINE HCL 20 MG
20 TABLET ORAL 3 TIMES DAILY PRN
Qty: 60 TABLET | Refills: 1 | Status: SHIPPED | OUTPATIENT
Start: 2021-08-30 | End: 2022-11-14 | Stop reason: SDUPTHER

## 2021-08-30 NOTE — PROGRESS NOTES
New Patient Consult      Date: 2021   Patient Name: Kb Minaya  MRN: [id number]  : 1970     Referring Physician: Arjun Delgadillo MD    Chief Complaint   Patient presents with   • Steatohepatitis       History of Present Illness: Kb Minaya is a 51 y.o. male who is here today to establish care with Gastroenterology for possible cirrhosis.   He states that he was in the emergency room yesterday with the complaints of near syncope.  He had this episode at least 4 times over the 1 year time.  He has been having intermittent abdominal pain mostly RUQ, RLQ and occasionally lower abdomen over 5 years. Associated nausea occasional vomiting. intermittently and loose stool, watery diarrhea. He will have anywhere loose stool 1-3 times per day. This patient deny any hematochezia or melena.  Weight is stable. No odynophagia or dysphagia. There is history of acid reflux on ppi now with decent control on symptoms. There is no history of anemia.  Last EGD was on 2021 by Dr. Diaz, gastroenterology revealed mild gastritis and intestinal metaplasia.  Esophagus reported as normal without any esophageal varices. Last colonoscopy was year ago at [facility], advanced polyos removed as per pt. Polyps removed and advised to repeat colon in one year.     He had a laparoscopic cholecystectomy done on 2021 for a biliary dyskinesia and gallbladder sludge noted with ultrasound done in 2021.  His pathology revealed chronic cholecystitis and focal cholesterolosis.  Laparoscopically liver was nodular suggesting cirrhosis.  Biopsies obtained revealed steatohepatitis grade 2 of the 3 and page 4 with a 4.  Patient is obese, hypertensive and diabetic indicating metabolic syndrome.  He had a last CT scan abdomen pelvis done in 2020 which revealed signs of cirrhosis.  No family history of any liver cirrhosis.  No prior history of any chronic hepatitis.    No family history of colon cancer or any GI  "malignancy. Dad had colon Ca. No history of any abdominal surgery. Denies alcohol abuse or cigarette smoking.   He has a prior history of coronary artery disease and MI in 2020 and 2012.  He had a cardiac stent placed in 2006  He is on ASA/Plavix/ Pletal.     Subjective      Past Medical History:   Past Medical History:   Diagnosis Date   • Anxiety and depression    • Asthma    • Back pain    • Blood clot in vein 01/2021    right leg-states has resolved now   • CKD (chronic kidney disease)     pt denies 7/6/21   • Diabetes mellitus (CMS/HCC)    • Fatty liver    • History of nuclear stress test 01/2020    \"I did ok with it\"   • Hypertension    • Kidney stone    • Myocardial infarction (CMS/HCC)     MI IN 2006 (stent x 1), 2012 (no stents), 8/2/2020 (no stents with last MI)   • Pilonidal cyst    • Plantar fasciitis     bilateral   • Sleep apnea     CPAP-\"I don't wear it every night\"   • Wears glasses        Past Surgical History:   Past Surgical History:   Procedure Laterality Date   • APPENDECTOMY     • CARDIAC CATHETERIZATION  2006    stent x 1   • CARDIAC CATHETERIZATION  2012    no stents   • COLONOSCOPY     • ENDOSCOPY     • ENDOSCOPY N/A 7/7/2021    Procedure: ESOPHAGOGASTRODUODENOSCOPY WITH BOPSIES;  Surgeon: Arjun Delgadillo MD;  [facility];  Service: Gastroenterology;  Laterality: N/A;   • PLANTAR FASCIA RELEASE      right   • SKIN BIOPSY      benign       Family History:   Family History   Problem Relation Age of Onset   • Diabetes Mother    • COPD Mother    • Heart failure Mother    • Heart disease Father    • Lung cancer Father        Social History:   Social History     Socioeconomic History   • Marital status:      Spouse name: Not on file   • Number of children: Not on file   • Years of education: Not on file   • Highest education level: Not on file   Tobacco Use   • Smoking status: Never Smoker   • Smokeless tobacco: Never Used   Vaping Use   • Vaping Use: Never used   Substance " and Sexual Activity   • Alcohol use: Yes     Comment: maybe 1 drink per year (rarely)   • Drug use: No   • Sexual activity: Defer         Current Outpatient Medications:   •  aspirin 325 MG tablet, Take 325 mg by mouth Daily., Disp: , Rfl:   •  carvedilol (COREG) 6.25 MG tablet, Take 6.25 mg by mouth 2 (Two) Times a Day With Meals., Disp: , Rfl:   •  cilostazol (PLETAL) 50 MG tablet, TAKE 1 TABLET BY MOUTH TWICE DAILY 30 MINUTES BEFORE OR 2 HOURS AFTER BREAKFAST AND DINNER, Disp: , Rfl:   •  clopidogrel (PLAVIX) 75 MG tablet, Take 75 mg by mouth Daily., Disp: , Rfl:   •  glyburide (DIAbeta) 5 MG tablet, Take 10 mg by mouth 2 (Two) Times a Day With Meals., Disp: , Rfl:   •  hydrOXYzine (ATARAX) 10 MG tablet, Take 10 mg by mouth 2 (Two) Times a Day As Needed. for anxiety, Disp: , Rfl:   •  lisinopril-hydrochlorothiazide (PRINZIDE,ZESTORETIC) 20-25 MG per tablet, Take 1 tablet by mouth Daily., Disp: , Rfl:   •  nitroglycerin (NITROSTAT) 0.4 MG SL tablet, Place 0.4 mg under the tongue., Disp: , Rfl:   •  pantoprazole (PROTONIX) 40 MG EC tablet, Take 40 mg by mouth Daily., Disp: , Rfl:   •  venlafaxine XR (EFFEXOR-XR) 150 MG 24 hr capsule, Take 150 mg by mouth Daily., Disp: , Rfl:   •  Ventolin  (90 Base) MCG/ACT inhaler, Inhale 1-2 puffs As Needed. every 4 to 6 hours, Disp: , Rfl:   •  Dulaglutide (Trulicity) 1.5 MG/0.5ML solution pen-injector, Inject 1.5 mg under the skin into the appropriate area as directed 1 (One) Time Per Week., Disp: , Rfl:   No current facility-administered medications for this visit.    Allergies   Allergen Reactions   • Morphine And Related Anaphylaxis   • Simvastatin Other (See Comments)     Liver problems   • Penicillins Hives   • Pradaxa [Dabigatran Etexilate Mesylate] Hives       Review of Systems:   Review of Systems   Constitutional: Negative for appetite change, fatigue, fever and unexpected weight loss.   HENT: Negative for trouble swallowing.    Respiratory: Negative for cough,  "shortness of breath and wheezing.    Cardiovascular: Negative for chest pain, palpitations and leg swelling.   Gastrointestinal: Positive for abdominal pain, diarrhea, nausea, GERD and indigestion. Negative for abdominal distention, anal bleeding, blood in stool, constipation, rectal pain and vomiting.   Genitourinary: Negative for dysuria, frequency and hematuria.   Musculoskeletal: Negative for back pain and joint swelling.   Skin: Negative for color change, rash and skin lesions.   Neurological: Negative for dizziness, syncope, speech difficulty, weakness, headache and memory problem.   Hematological: Negative for adenopathy. Does not bruise/bleed easily.   Psychiatric/Behavioral: Negative for agitation, behavioral problems, suicidal ideas and depressed mood.       The following portions of the patient's history were reviewed and updated as appropriate: allergies, current medications, past family history, past medical history, past social history, past surgical history and problem list.    Objective     Physical Exam:  Vital Signs:   Vitals:    08/30/21 1052   BP: 110/82   Temp: 97.1 °F (36.2 °C)   TempSrc: Infrared   Weight: 134 kg (295 lb 12.8 oz)   Height: 185.4 cm (73\")       Physical Exam  Vitals and nursing note reviewed.   Constitutional:       Appearance: Normal appearance. He is well-developed. He is obese.   HENT:      Head: Normocephalic and atraumatic.      Right Ear: External ear normal.      Left Ear: External ear normal.   Eyes:      Conjunctiva/sclera: Conjunctivae normal.   Neck:      Thyroid: No thyromegaly.      Trachea: No tracheal deviation.   Cardiovascular:      Rate and Rhythm: Normal rate and regular rhythm.      Heart sounds: No murmur heard.     Pulmonary:      Effort: Pulmonary effort is normal. No respiratory distress.      Breath sounds: Normal breath sounds.   Abdominal:      General: Bowel sounds are normal. There is no distension.      Palpations: Abdomen is soft. There is no " mass.      Tenderness: There is no abdominal tenderness (Minimal tenderness in the right upper quadrant epigastric region right lower quadrant).      Hernia: No hernia is present.   Musculoskeletal:         General: Normal range of motion.      Cervical back: Normal range of motion and neck supple.   Skin:     General: Skin is warm and dry.   Neurological:      General: No focal deficit present.      Mental Status: He is alert and oriented to person, place, and time.      Cranial Nerves: No cranial nerve deficit.      Sensory: No sensory deficit.   Psychiatric:         Mood and Affect: Mood normal.         Behavior: Behavior normal.         Thought Content: Thought content normal.         Judgment: Judgment normal.         Results Review:   I have reviewed the patient's new clinical and imaging results and agree with the interpretation.     Admission on 08/29/2021, Discharged on 08/30/2021   Component Date Value Ref Range Status   • Glucose 08/29/2021 258* 65 - 99 mg/dL Final    Glucose >180, Hemoglobin A1C recommended.   • BUN 08/29/2021 13  6 - 20 mg/dL Final   • Creatinine 08/29/2021 0.95  0.76 - 1.27 mg/dL Final   • Sodium 08/29/2021 135* 136 - 145 mmol/L Final   • Potassium 08/29/2021 4.1  3.5 - 5.2 mmol/L Final   • Chloride 08/29/2021 100  98 - 107 mmol/L Final   • CO2 08/29/2021 22.6  22.0 - 29.0 mmol/L Final   • Calcium 08/29/2021 9.3  8.6 - 10.5 mg/dL Final   • Total Protein 08/29/2021 7.0  6.0 - 8.5 g/dL Final   • Albumin 08/29/2021 3.60  3.50 - 5.20 g/dL Final   • ALT (SGPT) 08/29/2021 81* 1 - 41 U/L Final   • AST (SGOT) 08/29/2021 46* 1 - 40 U/L Final   • Alkaline Phosphatase 08/29/2021 132* 39 - 117 U/L Final   • Total Bilirubin 08/29/2021 0.5  0.0 - 1.2 mg/dL Final   • eGFR Non African Amer 08/29/2021 83  >60 mL/min/1.73 Final   • Globulin 08/29/2021 3.4  gm/dL Final   • A/G Ratio 08/29/2021 1.1  g/dL Final   • BUN/Creatinine Ratio 08/29/2021 13.7  7.0 - 25.0 Final   • Anion Gap 08/29/2021 12.4  5.0 -  15.0 mmol/L Final   • Magnesium 08/29/2021 1.8  1.6 - 2.6 mg/dL Final   • Troponin T 08/29/2021 <0.010  0.000 - 0.030 ng/mL Final   • Extra Tube 08/29/2021 Hold for add-ons.   Final    Auto resulted.   • Extra Tube 08/29/2021 hold for add-on   Final    Auto resulted   • Extra Tube 08/29/2021 Hold for add-ons.   Final    Auto resulted.   • Extra Tube 08/29/2021 hold for add-on   Final    Auto resulted   • WBC 08/29/2021 6.88  3.40 - 10.80 10*3/mm3 Final   • RBC 08/29/2021 5.24  4.14 - 5.80 10*6/mm3 Final   • Hemoglobin 08/29/2021 16.1  13.0 - 17.7 g/dL Final   • Hematocrit 08/29/2021 46.2  37.5 - 51.0 % Final   • MCV 08/29/2021 88.2  79.0 - 97.0 fL Final   • MCH 08/29/2021 30.7  26.6 - 33.0 pg Final   • MCHC 08/29/2021 34.8  31.5 - 35.7 g/dL Final   • RDW 08/29/2021 12.5  12.3 - 15.4 % Final   • RDW-SD 08/29/2021 40.2  37.0 - 54.0 fl Final   • MPV 08/29/2021 10.8  6.0 - 12.0 fL Final   • Platelets 08/29/2021 159  140 - 450 10*3/mm3 Final   • Neutrophil % 08/29/2021 63.6  42.7 - 76.0 % Final   • Lymphocyte % 08/29/2021 26.9  19.6 - 45.3 % Final   • Monocyte % 08/29/2021 7.6  5.0 - 12.0 % Final   • Eosinophil % 08/29/2021 1.2  0.3 - 6.2 % Final   • Basophil % 08/29/2021 0.4  0.0 - 1.5 % Final   • Immature Grans % 08/29/2021 0.3  0.0 - 0.5 % Final   • Neutrophils, Absolute 08/29/2021 4.38  1.70 - 7.00 10*3/mm3 Final   • Lymphocytes, Absolute 08/29/2021 1.85  0.70 - 3.10 10*3/mm3 Final   • Monocytes, Absolute 08/29/2021 0.52  0.10 - 0.90 10*3/mm3 Final   • Eosinophils, Absolute 08/29/2021 0.08  0.00 - 0.40 10*3/mm3 Final   • Basophils, Absolute 08/29/2021 0.03  0.00 - 0.20 10*3/mm3 Final   • Immature Grans, Absolute 08/29/2021 0.02  0.00 - 0.05 10*3/mm3 Final   • nRBC 08/29/2021 0.0  0.0 - 0.2 /100 WBC Final   Lab on 08/07/2021   Component Date Value Ref Range Status   • COVID19 08/07/2021 Not Detected  Not Detected - Ref. Range Final   Admission on 07/07/2021, Discharged on 07/07/2021   Component Date Value Ref Range  Status   • Glucose 07/07/2021 251* 70 - 130 mg/dL Final    Serial Number: [id number]:  [id number]   • Case Report 07/07/2021    Final                    Value:Surgical Pathology Report                         Case: [id number]                                  Authorizing Provider:  Arjun Delgadillo MD        Collected:           07/07/2021 09:13 AM          Ordering Location:     [facility]    Received:            07/07/2021 02:07 PM                                 [facility]                                                                    Pathologist:           Lamberto Martin MD                                                          Specimen:    Gastric, Antrum, ANTRUM BIOPSY                                                            • Final Diagnosis 07/07/2021    Final                    Value:This result contains rich text formatting which cannot be displayed here.   Lab on 07/03/2021   Component Date Value Ref Range Status   • SARS-CoV-2 SYED 07/03/2021 Not Detected  Not Detected Final   Admission on 06/26/2021, Discharged on 06/26/2021   Component Date Value Ref Range Status   • Glucose 06/26/2021 235* 65 - 99 mg/dL Final    Glucose >180, Hemoglobin A1C recommended.   • BUN 06/26/2021 13  6 - 20 mg/dL Final   • Creatinine 06/26/2021 0.77  0.76 - 1.27 mg/dL Final   • Sodium 06/26/2021 133* 136 - 145 mmol/L Final   • Potassium 06/26/2021 4.0  3.5 - 5.2 mmol/L Final    Slight hemolysis detected by analyzer. Results may be affected.   • Chloride 06/26/2021 100  98 - 107 mmol/L Final   • CO2 06/26/2021 22.4  22.0 - 29.0 mmol/L Final   • Calcium 06/26/2021 8.5* 8.6 - 10.5 mg/dL Final   • Total Protein 06/26/2021 7.0  6.0 - 8.5 g/dL Final   • Albumin 06/26/2021 3.70  3.50 - 5.20 g/dL Final   • ALT (SGPT) 06/26/2021 125* 1 - 41 U/L Final   • AST (SGOT) 06/26/2021 75* 1 - 40 U/L Final    Slight hemolysis detected by analyzer. Results may be affected.   • Alkaline Phosphatase 06/26/2021 99  39 - 117  U/L Final   • Total Bilirubin 06/26/2021 0.8  0.0 - 1.2 mg/dL Final   • eGFR Non  Amer 06/26/2021 106  >60 mL/min/1.73 Final   • Globulin 06/26/2021 3.3  gm/dL Final   • A/G Ratio 06/26/2021 1.1  g/dL Final   • BUN/Creatinine Ratio 06/26/2021 16.9  7.0 - 25.0 Final   • Anion Gap 06/26/2021 10.6  5.0 - 15.0 mmol/L Final   • Lipase 06/26/2021 20  13 - 60 U/L Final   • Color, UA 06/26/2021 Dark Yellow* Yellow, Straw Final   • Appearance, UA 06/26/2021 Clear  Clear Final   • pH, UA 06/26/2021 <=5.0  5.0 - 8.0 Final   • Specific Gravity, UA 06/26/2021 >=1.030  1.005 - 1.030 Final   • Glucose, UA 06/26/2021 >=1000 mg/dL (3+)* Negative Final   • Ketones, UA 06/26/2021 Trace* Negative Final   • Bilirubin, UA 06/26/2021 Negative  Negative Final   • Blood, UA 06/26/2021 Negative  Negative Final   • Protein, UA 06/26/2021 Negative  Negative Final   • Leuk Esterase, UA 06/26/2021 Negative  Negative Final   • Nitrite, UA 06/26/2021 Negative  Negative Final   • Urobilinogen, UA 06/26/2021 1.0 E.U./dL  0.2 - 1.0 E.U./dL Final   • WBC 06/26/2021 8.20  3.40 - 10.80 10*3/mm3 Final   • RBC 06/26/2021 5.57  4.14 - 5.80 10*6/mm3 Final   • Hemoglobin 06/26/2021 17.4  13.0 - 17.7 g/dL Final   • Hematocrit 06/26/2021 50.2  37.5 - 51.0 % Final   • MCV 06/26/2021 90.1  79.0 - 97.0 fL Final   • MCH 06/26/2021 31.2  26.6 - 33.0 pg Final   • MCHC 06/26/2021 34.7  31.5 - 35.7 g/dL Final   • RDW 06/26/2021 13.4  12.3 - 15.4 % Final   • RDW-SD 06/26/2021 44.7  37.0 - 54.0 fl Final   • MPV 06/26/2021 11.1  6.0 - 12.0 fL Final   • Platelets 06/26/2021 124* 140 - 450 10*3/mm3 Final   • Neutrophil % 06/26/2021 67.0  42.7 - 76.0 % Final   • Lymphocyte % 06/26/2021 23.9  19.6 - 45.3 % Final   • Monocyte % 06/26/2021 6.8  5.0 - 12.0 % Final   • Eosinophil % 06/26/2021 1.5  0.3 - 6.2 % Final   • Basophil % 06/26/2021 0.6  0.0 - 1.5 % Final   • Immature Grans % 06/26/2021 0.2  0.0 - 0.5 % Final   • Neutrophils, Absolute 06/26/2021 5.49  1.70 - 7.00  10*3/mm3 Final   • Lymphocytes, Absolute 06/26/2021 1.96  0.70 - 3.10 10*3/mm3 Final   • Monocytes, Absolute 06/26/2021 0.56  0.10 - 0.90 10*3/mm3 Final   • Eosinophils, Absolute 06/26/2021 0.12  0.00 - 0.40 10*3/mm3 Final   • Basophils, Absolute 06/26/2021 0.05  0.00 - 0.20 10*3/mm3 Final   • Immature Grans, Absolute 06/26/2021 0.02  0.00 - 0.05 10*3/mm3 Final   • nRBC 06/26/2021 0.0  0.0 - 0.2 /100 WBC Final   • Color, UA 06/26/2021 Dark Yellow* Yellow, Straw Final   • Appearance, UA 06/26/2021 Clear  Clear Final   • pH, UA 06/26/2021 <=5.0  5.0 - 8.0 Final   • Specific Gravity, UA 06/26/2021 >=1.030  1.005 - 1.030 Final   • Glucose, UA 06/26/2021 >=1000 mg/dL (3+)* Negative Final   • Ketones, UA 06/26/2021 Trace* Negative Final   • Bilirubin, UA 06/26/2021 Negative  Negative Final   • Blood, UA 06/26/2021 Negative  Negative Final   • Protein, UA 06/26/2021 Negative  Negative Final   • Leuk Esterase, UA 06/26/2021 Negative  Negative Final   • Nitrite, UA 06/26/2021 Negative  Negative Final   • Urobilinogen, UA 06/26/2021 1.0 E.U./dL  0.2 - 1.0 E.U./dL Final   • RBC Morphology 06/26/2021 Normal  Normal Final   • WBC Morphology 06/26/2021 Normal  Normal Final   • Platelet Estimate 06/26/2021 Decreased  Normal Final      XR Chest 2 View    Result Date: 8/30/2021  Unremarkable chest exam.  This report was finalized on 8/30/2021 8:15 AM by Elmo Valle MD.    XR Shoulder 2+ View Left    Result Date: 8/30/2021  Unremarkable exam.    This report was finalized on 8/30/2021 8:14 AM by Elmo Valle MD.    XR Elbow 3+ View Left    Result Date: 8/30/2021  Degenerative changes.  No acute bony abnormality.    This report was finalized on 8/30/2021 8:15 AM by Elmo Valle MD.    CT Head Without Contrast    Result Date: 8/29/2021  Unremarkable. Authenticated by Jani Vann M.D. on 08/29/2021 11:18:58 PM    MRI Cervical Spine Without Contrast    Result Date: 8/28/2021  Degenerative changes and multilevel spinal stenosis as  described. No cord edema or myelomalacia identified.    This report was finalized on 8/28/2021 9:04 AM by Dr Jordan Adan DO.    MRI Lumbar Spine Without Contrast    Result Date: 8/28/2021  Multilevel facet hypertrophy resulting in spinal stenosis at L1-L2, L2-L3, and L3-L4. Moderate neuroforaminal stenoses bilaterally at L4-L5.  This report was finalized on 8/28/2021 8:58 AM by Dr Jordan Adan DO.    CT Abdomen Pelvis With Contrast    Result Date: 6/26/2021  IMPRESSION: 1. A few loops of small bowel within the central abdomen which are dilated measuring up to 3.2 cm containing intraluminal fluid.  Findings may indicate changes of infectious or inflammatory process including enteritis or even early development of ileus.  Finding is best identified on coronal image 44 of series 3 and axial image 70 of series 2. 2. Minor nodularity of the periphery of the liver with hepatic steatosis.  Findings may indicate changes of cirrhotic liver morphology, recommend correlation with laboratory values including of her function tests. Authenticated by Elmo Olsen DO on 06/26/2021 02:24:12 AM    US Gallbladder    Result Date: 6/26/2021  IMPRESSION: 1.  Sludge layering dependently within the lumen of the gallbladder. 2.  Coarsened hepatic echogenicity a finding which can indicate hepatocellular disease including steatosis. Authenticated by Elmo Olsen DO on 06/26/2021 04:31:26 AM    NM HIDA Scan With Pharmacological Intervention    Result Date: 7/21/2021  Abnormal hepatobiliary scan with a gallbladder ejection fraction of 0%.  This report was finalized on 7/21/2021 12:25 PM by Chi Patel M.D..      Assessment / Plan      Assessment & Plan:  1. Cirrhosis of liver without ascites, unspecified hepatic cirrhosis type (CMS/HCC); MELD; to be calculated with today's lab work  2. NEW (nonalcoholic steatohepatitis)  3. Class 2 obesity due to excess calories with body mass index (BMI) of 39.0 to 39.9 in adult, unspecified whether  serious comorbidity presen  4. Elevated LFTs  5. Acquired thrombocytopenia (CMS/HCC)  Patient history, prior work-up, clinical finding is not more in favor of compensated liver cirrhosis secondary to NEW without ascites  He had a elevated liver enzymes over 8 years now.  Last blood work on record was in 2014 with elevated liver enzymes and thrombocytopenia.  His recent EGD done in July 2021 by Dr. Diaz did not reveal any esophageal varices.  Ultrasound abdomen in July 2021 did not reveal any liver lesions.  Liver biopsy done during the laparoscopic cholecystectomy revealed a grade 2 through 3 steatohepatitis with the stage 4 fibrosis suggesting cirrhosis.  He is nonalcoholic and non-smoker.    We had a detailed discussion regarding her dietary changes and lifestyle changes to prevent the course of the disease and progression of the disease.   As per patient he lost about 30 pounds over the 1 year time.  I have encouraged him to continue to lose weight and keep some realistic goal to lose at least 20 to 30 pounds over the next 6 months time.  Patient was on Metformin that would have been helpful however he developed intolerance and that has been stopped.  We had a discussion regarding the risk and benefits of taking high risk vitamin E .  Patient is agreeable to proceed with the therapy and advised to take vitamin E 400 units p.o. twice daily.   He needs a repeat ultrasound abdomen for HCC surveillance in January 2022  He needs a surveillance EGD in 2023  Low-salt diet discussed with the patient  We will get basic work-up to rule out any other etiology for his cirrhosis.  We will follow-up in 3 months time    6. Gastroesophageal reflux disease without esophagitis  Patient has been having significant reflux symptoms.  Currently on a Protonix 40 g p.o. daily with decent control of his symptoms.   Reflux diet discussed with the patient.  We will continue PPI at current dose for now with the aim to reduce the dose to  20 mg over the next 3 to 6 months time.    7. Personal history of colonic polyps  As per patient he had a colonoscopy done a year ago at [facility].  He had 5 polyps removed one of them was large and was advised to repeat colonoscopy in 1 year time.  No report available to interpret however patient appears to have had advanced polyp.  We will schedule him for surveillance colonoscopy now    The indications, technique, alternatives and potential risk and complications were discussed with the patient including but not limited to bleeding, bowel perforations, missing lesions and anesthetic complications. The patient understands and wishes to proceed with the procedure and has given their verbal consent. Written patient education information was given to the patient.   The patient will call if they have further questions before procedure.       8. Change in bowel habits  9. Right upper quadrant abdominal pain  10. Right lower quadrant abdominal pain  11. Nausea  Patient has a functional issues including ongoing nausea, right-sided abdominal pain change in bowel habit in the form of loose stools.  His biliary dyskinesia is likely a part of his global functional symptoms rather than an isolated pathology causing his symptoms.  He does have a significant pain in the right upper quadrant right lower quadrant and suprapubic region.  His nausea symptoms may improve with gallbladder surgery however his lower GI symptoms still may persist including the abdominal pain.    He had a CT scan abdomen pelvis done in July 2021 and 2020.  He also had ultrasound abdomen in July 2020.  He had a recent EGD and had a last colonoscopy a year ago.  His duodenal biopsy negative for celiac disease    We will get a random colon biopsies and small bowel biopsies during the colonoscopy to rule out microscopic colitis and IBD although unlikely    His symptoms are likely secondary to irritable bowel syndrome and to some extent diabetic neuropathy  associated.  I have discussed the dietary changes including avoiding cheese, dairy food, avoiding carbonated beverages.   I am going to start him on a antispasmodic as needed  We will reassess him in a next 3 months time with the above changes  Will consider antidiarrheal next visit.  Zofran as needed as needed        Follow Up:   No follow-ups on file.    Yaneth Bell MD  [facility]  8/30/2021  11:26 EDT    Please note that portions of this note may have been completed with a voice recognition program.

## 2021-09-03 ENCOUNTER — PATIENT ROUNDING (BHMG ONLY) (OUTPATIENT)
Dept: GASTROENTEROLOGY | Facility: CLINIC | Age: 51
End: 2021-09-03

## 2021-09-03 NOTE — PROGRESS NOTES
September 3, 2021    Hello, may I speak with Kb Minaya?    My name is Li Liriano    I am  with [facility]  [address]  [facility] [address] [phone].    Before we get started may I verify your date of birth? 1970    I am calling to officially welcome you to our practice and ask about your recent visit. Is this a good time to talk? Yes    Tell me about your visit with us. What things went well?  Went very good.       We're always looking for ways to make our patients' experiences even better. Do you have recommendations on ways we may improve?  No    Overall were you satisfied with your first visit to our practice? Yes       I appreciate you taking the time to speak with me today. Is there anything else I can do for you? Yes, I have a question re: Vit E. I told him I would have CMA call him back.      Thank you, and have a great day.

## 2021-09-07 DIAGNOSIS — R10.10 PAIN OF UPPER ABDOMEN: Primary | ICD-10-CM

## 2021-09-07 DIAGNOSIS — R19.7 DIARRHEA, UNSPECIFIED TYPE: ICD-10-CM

## 2021-09-08 ENCOUNTER — LAB (OUTPATIENT)
Dept: LAB | Facility: HOSPITAL | Age: 51
End: 2021-09-08

## 2021-09-08 PROCEDURE — 86704 HEP B CORE ANTIBODY TOTAL: CPT | Performed by: INTERNAL MEDICINE

## 2021-09-08 PROCEDURE — 82947 ASSAY GLUCOSE BLOOD QUANT: CPT | Performed by: INTERNAL MEDICINE

## 2021-09-08 PROCEDURE — 82247 BILIRUBIN TOTAL: CPT | Performed by: INTERNAL MEDICINE

## 2021-09-08 PROCEDURE — 82465 ASSAY BLD/SERUM CHOLESTEROL: CPT | Performed by: INTERNAL MEDICINE

## 2021-09-08 PROCEDURE — 84478 ASSAY OF TRIGLYCERIDES: CPT | Performed by: INTERNAL MEDICINE

## 2021-09-08 PROCEDURE — 82103 ALPHA-1-ANTITRYPSIN TOTAL: CPT | Performed by: INTERNAL MEDICINE

## 2021-09-08 PROCEDURE — 86038 ANTINUCLEAR ANTIBODIES: CPT | Performed by: INTERNAL MEDICINE

## 2021-09-08 PROCEDURE — 86803 HEPATITIS C AB TEST: CPT | Performed by: INTERNAL MEDICINE

## 2021-09-08 PROCEDURE — 84450 TRANSFERASE (AST) (SGOT): CPT | Performed by: INTERNAL MEDICINE

## 2021-09-08 PROCEDURE — 87340 HEPATITIS B SURFACE AG IA: CPT | Performed by: INTERNAL MEDICINE

## 2021-09-08 PROCEDURE — 82977 ASSAY OF GGT: CPT | Performed by: INTERNAL MEDICINE

## 2021-09-08 PROCEDURE — 84466 ASSAY OF TRANSFERRIN: CPT | Performed by: INTERNAL MEDICINE

## 2021-09-08 PROCEDURE — 83883 ASSAY NEPHELOMETRY NOT SPEC: CPT | Performed by: INTERNAL MEDICINE

## 2021-09-08 PROCEDURE — 82172 ASSAY OF APOLIPOPROTEIN: CPT | Performed by: INTERNAL MEDICINE

## 2021-09-08 PROCEDURE — 83516 IMMUNOASSAY NONANTIBODY: CPT | Performed by: INTERNAL MEDICINE

## 2021-09-08 PROCEDURE — 82728 ASSAY OF FERRITIN: CPT | Performed by: INTERNAL MEDICINE

## 2021-09-08 PROCEDURE — 86708 HEPATITIS A ANTIBODY: CPT | Performed by: INTERNAL MEDICINE

## 2021-09-08 PROCEDURE — 85610 PROTHROMBIN TIME: CPT | Performed by: INTERNAL MEDICINE

## 2021-09-08 PROCEDURE — 83010 ASSAY OF HAPTOGLOBIN QUANT: CPT | Performed by: INTERNAL MEDICINE

## 2021-09-08 PROCEDURE — 86706 HEP B SURFACE ANTIBODY: CPT | Performed by: INTERNAL MEDICINE

## 2021-09-08 PROCEDURE — 83540 ASSAY OF IRON: CPT | Performed by: INTERNAL MEDICINE

## 2021-09-08 PROCEDURE — 84460 ALANINE AMINO (ALT) (SGPT): CPT | Performed by: INTERNAL MEDICINE

## 2021-09-20 ENCOUNTER — HOSPITAL ENCOUNTER (OUTPATIENT)
Dept: CT IMAGING | Facility: HOSPITAL | Age: 51
Discharge: HOME | End: 2021-09-20
Admitting: INTERNAL MEDICINE

## 2021-09-20 DIAGNOSIS — R19.7 DIARRHEA, UNSPECIFIED TYPE: ICD-10-CM

## 2021-09-20 DIAGNOSIS — R10.10 PAIN OF UPPER ABDOMEN: ICD-10-CM

## 2021-09-20 PROCEDURE — 74160 CT ABDOMEN W/CONTRAST: CPT

## 2021-09-20 PROCEDURE — 25010000002 IOPAMIDOL 61 % SOLUTION: Performed by: INTERNAL MEDICINE

## 2021-09-20 RX ADMIN — IOPAMIDOL 100 ML: 612 INJECTION, SOLUTION INTRAVENOUS at 09:30

## 2021-10-26 ENCOUNTER — TELEPHONE (OUTPATIENT)
Dept: GASTROENTEROLOGY | Facility: CLINIC | Age: 51
End: 2021-10-26

## 2021-10-26 ENCOUNTER — PREP FOR SURGERY (OUTPATIENT)
Dept: OTHER | Facility: HOSPITAL | Age: 51
End: 2021-10-26

## 2021-10-26 DIAGNOSIS — Z86.010 PERSONAL HISTORY OF COLONIC POLYPS: Primary | ICD-10-CM

## 2021-10-26 RX ORDER — SODIUM CHLORIDE 9 MG/ML
70 INJECTION, SOLUTION INTRAVENOUS CONTINUOUS PRN
Status: CANCELLED | OUTPATIENT
Start: 2021-10-26

## 2021-10-26 RX ORDER — POLYETHYLENE GLYCOL 3350 17 G/17G
POWDER, FOR SOLUTION ORAL
Qty: 238 G | Refills: 0 | Status: SHIPPED | OUTPATIENT
Start: 2021-10-26 | End: 2021-11-23 | Stop reason: HOSPADM

## 2021-10-26 RX ORDER — BISACODYL 5 MG/1
TABLET, DELAYED RELEASE ORAL
Qty: 4 TABLET | Refills: 0 | Status: SHIPPED | OUTPATIENT
Start: 2021-10-26 | End: 2021-11-23 | Stop reason: HOSPADM

## 2021-10-26 NOTE — TELEPHONE ENCOUNTER
----- Message from Martha Hurt MA sent at 10/26/2021 11:23 AM EDT -----  Patient is scheduled for colon on 11/23.. he needs a different prep sent to pharmacy and I will mail the new instructions.

## 2021-11-02 PROBLEM — Z86.0100 PERSONAL HISTORY OF COLONIC POLYPS: Status: ACTIVE | Noted: 2021-11-02

## 2021-11-02 PROBLEM — Z86.010 PERSONAL HISTORY OF COLONIC POLYPS: Status: ACTIVE | Noted: 2021-11-02

## 2021-11-22 RX ORDER — VITAMIN E 268 MG
400 CAPSULE ORAL 2 TIMES DAILY
COMMUNITY

## 2021-11-22 RX ORDER — DULAGLUTIDE 1.5 MG/.5ML
1.5 INJECTION, SOLUTION SUBCUTANEOUS WEEKLY
COMMUNITY
End: 2022-03-02

## 2021-11-23 ENCOUNTER — ANESTHESIA EVENT (OUTPATIENT)
Dept: GASTROENTEROLOGY | Facility: HOSPITAL | Age: 51
End: 2021-11-23

## 2021-11-23 ENCOUNTER — HOSPITAL ENCOUNTER (OUTPATIENT)
Facility: HOSPITAL | Age: 51
Setting detail: HOSPITAL OUTPATIENT SURGERY
Discharge: HOME | End: 2021-11-23
Attending: INTERNAL MEDICINE | Admitting: INTERNAL MEDICINE

## 2021-11-23 ENCOUNTER — ANESTHESIA (OUTPATIENT)
Dept: GASTROENTEROLOGY | Facility: HOSPITAL | Age: 51
End: 2021-11-23

## 2021-11-23 VITALS
WEIGHT: 285 LBS | BODY MASS INDEX: 37.77 KG/M2 | RESPIRATION RATE: 16 BRPM | HEART RATE: 80 BPM | DIASTOLIC BLOOD PRESSURE: 97 MMHG | HEIGHT: 73 IN | OXYGEN SATURATION: 96 % | TEMPERATURE: 98 F | SYSTOLIC BLOOD PRESSURE: 149 MMHG

## 2021-11-23 DIAGNOSIS — Z86.010 PERSONAL HISTORY OF COLONIC POLYPS: ICD-10-CM

## 2021-11-23 DIAGNOSIS — R19.4 CHANGE IN BOWEL HABIT: ICD-10-CM

## 2021-11-23 LAB — GLUCOSE BLDC GLUCOMTR-MCNC: 128 MG/DL (ref 70–130)

## 2021-11-23 PROCEDURE — 25010000002 PROPOFOL 10 MG/ML EMULSION: Performed by: NURSE ANESTHETIST, CERTIFIED REGISTERED

## 2021-11-23 PROCEDURE — 82962 GLUCOSE BLOOD TEST: CPT

## 2021-11-23 PROCEDURE — 45380 COLONOSCOPY AND BIOPSY: CPT | Performed by: INTERNAL MEDICINE

## 2021-11-23 RX ORDER — SIMETHICONE 20 MG/.3ML
EMULSION ORAL AS NEEDED
Status: DISCONTINUED | OUTPATIENT
Start: 2021-11-23 | End: 2021-11-23 | Stop reason: HOSPADM

## 2021-11-23 RX ORDER — PROPOFOL 10 MG/ML
VIAL (ML) INTRAVENOUS AS NEEDED
Status: DISCONTINUED | OUTPATIENT
Start: 2021-11-23 | End: 2021-11-23 | Stop reason: SURG

## 2021-11-23 RX ORDER — ONDANSETRON 2 MG/ML
4 INJECTION INTRAMUSCULAR; INTRAVENOUS ONCE AS NEEDED
Status: CANCELLED | OUTPATIENT
Start: 2021-11-23 | End: 2021-11-23

## 2021-11-23 RX ORDER — SODIUM CHLORIDE 9 MG/ML
70 INJECTION, SOLUTION INTRAVENOUS CONTINUOUS PRN
Status: DISCONTINUED | OUTPATIENT
Start: 2021-11-23 | End: 2021-11-23 | Stop reason: HOSPADM

## 2021-11-23 RX ADMIN — SODIUM CHLORIDE 70 ML/HR: 9 INJECTION, SOLUTION INTRAVENOUS at 10:09

## 2021-11-23 RX ADMIN — PROPOFOL 400 MG: 10 INJECTION, EMULSION INTRAVENOUS at 11:33

## 2021-11-23 NOTE — ANESTHESIA PREPROCEDURE EVALUATION
Anesthesia Evaluation     Patient summary reviewed and Nursing notes reviewed   no history of anesthetic complications:  NPO Solid Status: > 8 hours  NPO Liquid Status: > 8 hours           Airway   Mallampati: II  TM distance: >3 FB  Neck ROM: limited  Possible difficult intubation  Dental - normal exam     Pulmonary    (+) asthma,shortness of breath, sleep apnea on CPAP, decreased breath sounds,   Cardiovascular - normal exam    PT is on anticoagulation therapy  Patient on routine beta blocker    (+) hypertension well controlled 2 medications or greater, past MI  6-12 months, cardiac stents more than 12 months ago angina, STOKES, PVD, DVT,       Neuro/Psych  (+) psychiatric history Anxiety and Depression,     GI/Hepatic/Renal/Endo    (+) obesity, morbid obesity, GERD,  liver disease fatty liver disease, renal disease stones, diabetes mellitus type 2,     Musculoskeletal     (+) back pain,   Abdominal  - normal exam   Substance History      OB/GYN          Other        ROS/Med Hx Other: Non compliant with cpap   Labs reviewed                    Anesthesia Plan    ASA 4     MAC   (Risks and benefits discussed including risk of aspiration, recall and dental damage. All patient questions answered.    Will continue with plan of care.)  intravenous induction     Anesthetic plan, all risks, benefits, and alternatives have been provided, discussed and informed consent has been obtained with: patient.    Plan discussed with CRNA.

## 2021-11-23 NOTE — ANESTHESIA POSTPROCEDURE EVALUATION
Patient: Kb Minaya    Procedure Summary     Date: 11/23/21 Room / Location: [facility] ENDOSCOPY 2 / [facility] ENDOSCOPY    Anesthesia Start: 1109 Anesthesia Stop: 1135    Procedure: COLONOSCOPY with biopsies (N/A Anus) Diagnosis:       Personal history of colonic polyps      (Personal history of colonic polyps [Z86.010])    Surgeons: Yaneth Bell MD Provider: Jules Peres CRNA    Anesthesia Type: MAC ASA Status: 4          Anesthesia Type: MAC    Vitals  Vitals Value Taken Time   /97 11/23/21 1201   Temp 98 °F (36.7 °C) 11/23/21 1137   Pulse 80 11/23/21 1201   Resp 16 11/23/21 1201   SpO2 96 % 11/23/21 1201           Post Anesthesia Care and Evaluation    Patient location during evaluation: PHASE II  Patient participation: complete - patient participated  Level of consciousness: awake  Pain score: 1  Pain management: adequate  Airway patency: patent  Anesthetic complications: No anesthetic complications  PONV Status: controlled  Cardiovascular status: acceptable and stable  Respiratory status: acceptable  Hydration status: acceptable

## 2021-11-27 LAB
LAB AP CASE REPORT: NORMAL
PATH REPORT.FINAL DX SPEC: NORMAL

## 2021-12-01 ENCOUNTER — OFFICE VISIT (OUTPATIENT)
Dept: GASTROENTEROLOGY | Facility: CLINIC | Age: 51
End: 2021-12-01

## 2021-12-01 VITALS
TEMPERATURE: 97.8 F | SYSTOLIC BLOOD PRESSURE: 140 MMHG | HEIGHT: 73 IN | WEIGHT: 292 LBS | BODY MASS INDEX: 38.7 KG/M2 | DIASTOLIC BLOOD PRESSURE: 80 MMHG

## 2021-12-01 DIAGNOSIS — R19.4 CHANGE IN BOWEL HABITS: ICD-10-CM

## 2021-12-01 DIAGNOSIS — K74.60 CIRRHOSIS OF LIVER WITHOUT ASCITES, UNSPECIFIED HEPATIC CIRRHOSIS TYPE (HCC): ICD-10-CM

## 2021-12-01 DIAGNOSIS — K75.81 NASH (NONALCOHOLIC STEATOHEPATITIS): Primary | ICD-10-CM

## 2021-12-01 DIAGNOSIS — K21.9 GASTROESOPHAGEAL REFLUX DISEASE WITHOUT ESOPHAGITIS: ICD-10-CM

## 2021-12-01 PROCEDURE — 99213 OFFICE O/P EST LOW 20 MIN: CPT | Performed by: INTERNAL MEDICINE

## 2021-12-01 NOTE — PROGRESS NOTES
Follow Up Note     Date: 2021   Patient Name: Kb Minaya  MRN: [id number]  : 1970     Referring Physician: Vilma Cassidy MD    Chief Complaint:    Chief Complaint   Patient presents with   • Follow-up   • Cirrhosis       Interval History:   2021  Kb Minaya is a 51 y.o. male who is here today for follow up for follow-up of his cirrhosis.  He states that his abdominal pain and diarrhea has resolved now.  He is here for follow-up after his recent colonoscopy.    2021  Kb Minaya is a 51 y.o. male who is here today to establish care with Gastroenterology for possible cirrhosis.   He states that he was in the emergency room yesterday with the complaints of near syncope.  He had this episode at least 4 times over the 1 year time.  He has been having intermittent abdominal pain mostly RUQ, RLQ and occasionally lower abdomen over 5 years. Associated nausea occasional vomiting. intermittently and loose stool, watery diarrhea. He will have anywhere loose stool 1-3 times per day. This patient deny any hematochezia or melena.  Weight is stable. No odynophagia or dysphagia. There is history of acid reflux on ppi now with decent control on symptoms. There is no history of anemia.  Last EGD was on 2021 by Dr. Diaz, gastroenterology revealed mild gastritis and intestinal metaplasia.  Esophagus reported as normal without any esophageal varices. Last colonoscopy was year ago at [place], advanced polyos removed as per pt. Polyps removed and advised to repeat colon in one year.      He had a laparoscopic cholecystectomy done on 2021 for a biliary dyskinesia and gallbladder sludge noted with ultrasound done in 2021.  His pathology revealed chronic cholecystitis and focal cholesterolosis.  Laparoscopically liver was nodular suggesting cirrhosis.  Biopsies obtained revealed steatohepatitis grade 2 of the 3 and page 4 with a 4.  Patient is obese, hypertensive  "and diabetic indicating metabolic syndrome.  He had a last CT scan abdomen pelvis done in October 2020 which revealed signs of cirrhosis.  No family history of any liver cirrhosis.  No prior history of any chronic hepatitis.     No family history of colon cancer or any GI malignancy. Dad had colon Ca. No history of any abdominal surgery. Denies alcohol abuse or cigarette smoking.   He has a prior history of coronary artery disease and MI in 2020 and 2012.  He had a cardiac stent placed in 2006  He is on ASA/Plavix/ Pletal.   Subjective      Past Medical History:   Past Medical History:   Diagnosis Date   • Ankle fracture, left    • Anxiety    • Anxiety and depression    • Asthma    • Back pain    • Blood clot in vein 01/2021    right leg-states has resolved now   • Cirrhosis (HCC)    • CKD (chronic kidney disease)     pt denies 7/6/21   • Depression    • Diabetes mellitus (HCC)    • Fatty liver    • GERD (gastroesophageal reflux disease)    • H/O echocardiogram 02/2021   • History of nuclear stress test 01/2020    \"I did ok with it\"   • Hypertension    • Kidney stone    • Myocardial infarction (HCC)     MI IN 2006 (stent x 1), 2012 (no stents), 8/2/2020 (no stents with last MI).  Just saw Dr. Duenas recently for a f/u.  (assessed 11/22/21)   • Pilonidal cyst    • Plantar fasciitis     bilateral   • Seasonal allergies    • Sleep apnea     CPAP-\"I don't wear it every night\"   • Wears glasses    • Wrist fracture, left      Past Surgical History:   Past Surgical History:   Procedure Laterality Date   • APPENDECTOMY     • CARDIAC CATHETERIZATION  2006    stent x 1   • CARDIAC CATHETERIZATION  2012    no stents   • CHOLECYSTECTOMY  08/23/2021   • COLONOSCOPY     • COLONOSCOPY N/A 11/23/2021    Procedure: COLONOSCOPY with biopsies;  Surgeon: Yaneth Bell MD;  Location: [facility] ENDOSCOPY;  Service: Gastroenterology;  Laterality: N/A;   • ENDOSCOPY     • ENDOSCOPY N/A 7/7/2021    Procedure: ESOPHAGOGASTRODUODENOSCOPY " WITH BOPSIES;  Surgeon: Arjun Delgadillo MD;  Location: [facility] ENDOSCOPY;  Service: Gastroenterology;  Laterality: N/A;   • PLANTAR FASCIA RELEASE      right   • SKIN BIOPSY      benign       Family History:   Family History   Problem Relation Age of Onset   • Diabetes Mother    • COPD Mother    • Heart failure Mother    • Heart disease Father    • Lung cancer Father        Social History:   Social History     Socioeconomic History   • Marital status:    Tobacco Use   • Smoking status: Never Smoker   • Smokeless tobacco: Never Used   Vaping Use   • Vaping Use: Never used   Substance and Sexual Activity   • Alcohol use: Not Currently     Comment: maybe 1 drink per year (rarely)   • Drug use: No   • Sexual activity: Defer       Medications:     Current Outpatient Medications:   •  aspirin 325 MG tablet, Take 325 mg by mouth Daily., Disp: , Rfl:   •  carvedilol (COREG) 6.25 MG tablet, Take 6.25 mg by mouth 2 (Two) Times a Day With Meals., Disp: , Rfl:   •  cilostazol (PLETAL) 50 MG tablet, TAKE 1 TABLET BY MOUTH TWICE DAILY 30 MINUTES BEFORE OR 2 HOURS AFTER BREAKFAST AND DINNER, Disp: , Rfl:   •  clopidogrel (PLAVIX) 75 MG tablet, Take 75 mg by mouth Daily., Disp: , Rfl:   •  dicyclomine (BENTYL) 20 MG tablet, Take 1 tablet by mouth 3 (Three) Times a Day As Needed (abdominal pain)., Disp: 60 tablet, Rfl: 1  •  Dulaglutide (Trulicity) 1.5 MG/0.5ML solution pen-injector, Inject 1.5 mg under the skin into the appropriate area as directed 1 (One) Time Per Week., Disp: , Rfl:   •  glyburide (DIAbeta) 5 MG tablet, Take 10 mg by mouth 2 (Two) Times a Day With Meals., Disp: , Rfl:   •  hydrOXYzine (ATARAX) 10 MG tablet, Take 10 mg by mouth 2 (Two) Times a Day As Needed. for anxiety, Disp: , Rfl:   •  lisinopril-hydrochlorothiazide (PRINZIDE,ZESTORETIC) 20-25 MG per tablet, Take 1 tablet by mouth Daily., Disp: , Rfl:   •  nitroglycerin (NITROSTAT) 0.4 MG SL tablet, Place 0.4 mg under the tongue., Disp: , Rfl:   •   "venlafaxine XR (EFFEXOR-XR) 150 MG 24 hr capsule, Take 150 mg by mouth Daily., Disp: , Rfl:   •  Ventolin  (90 Base) MCG/ACT inhaler, Inhale 1-2 puffs As Needed. every 4 to 6 hours, Disp: , Rfl:   •  vitamin E 400 UNIT capsule, Take 400 Units by mouth 2 (Two) Times a Day., Disp: , Rfl:     Allergies:   Allergies   Allergen Reactions   • Morphine And Related Anaphylaxis   • Simvastatin Other (See Comments)     Liver problems   • Penicillins Hives   • Pradaxa [Dabigatran Etexilate Mesylate] Hives       Review of Systems:   Review of Systems   Constitutional: Negative for appetite change, fatigue, fever and unexpected weight loss.   HENT: Negative for trouble swallowing.    Gastrointestinal: Negative for abdominal distention, abdominal pain, anal bleeding, blood in stool, constipation, diarrhea, nausea, rectal pain, vomiting, GERD and indigestion.       The following portions of the patient's history were reviewed and updated as appropriate: allergies, current medications, past family history, past medical history, past social history, past surgical history and problem list.    Objective     Physical Exam:  Vital Signs:   Vitals:    12/01/21 1532   BP: 140/80   Temp: 97.8 °F (36.6 °C)   TempSrc: Infrared   Weight: 132 kg (292 lb)   Height: 185.4 cm (73\")       Physical Exam  Constitutional:       Appearance: He is obese.   HENT:      Head: Normocephalic and atraumatic.   Eyes:      Conjunctiva/sclera: Conjunctivae normal.   Abdominal:      General: Abdomen is flat. There is no distension.      Palpations: There is no mass.      Tenderness: There is no abdominal tenderness. There is no guarding or rebound.      Hernia: No hernia is present.   Musculoskeletal:      Cervical back: Normal range of motion and neck supple.   Neurological:      Mental Status: He is alert.         Results Review:   I reviewed the patient's new clinical results.    Admission on 11/23/2021, Discharged on 11/23/2021   Component Date Value " Ref Range Status   • Glucose 11/23/2021 128  70 - 130 mg/dL Final    Serial Number: [id number]:  [id number]   • Case Report 11/23/2021    Final                    Value:Surgical Pathology Report                         Case: [id number]                                  Authorizing Provider:  Yaneth Bell MD  Collected:           11/23/2021 11:15 AM          Ordering Location:     [facility]    Received:            11/23/2021 01:50 PM                                 [facility]                                                                    Pathologist:           Lis Hebert MD                                                        Specimens:   1) - Small Intestine, terminal ileum biopsy                                                         2) - Large Intestine, Left / Descending Colon, random colon biopsies                      • Final Diagnosis 11/23/2021    Final                    Value:This result contains rich text formatting which cannot be displayed here.      CT Abdomen With Contrast    Result Date: 9/20/2021  Cholecystectomy with a small amount of fluid in the gallbladder fossa. If there is clinical concern for bile leak, a hepatobiliary scan is recommended.  838.92 mGy.cm   This study was performed with techniques to keep radiation doses as low as reasonably achievable (ALARA). Individualized dose reduction techniques using automated exposure control or adjustment of mA and/or kV according to the patient size were employed.     Images were reviewed, interpreted, and dictated by Dr. Chi Patel M.D. Transcribed by Geetha Alejandra PA-C.  This report was finalized on 9/20/2021 1:21 PM by Chi Patel M.D..    11/23/2021  - Diverticulosis in the sigmoid colon.  - Non-bleeding internal hemorrhoids.  - The examined portion of the ileum was normal. Biopsied.  - Biopsies were taken with a cold forceps from the right colon, left colon and transverse colon for  evaluation of  microscopic colitis.  Assessment / Plan      1. Compensated cirrhosis of liver without ascites, unspecified hepatic cirrhosis type (CMS/HCC); MELD; 7 (8/2021)  2. NEW (nonalcoholic steatohepatitis) with compensated cirrhosis   3. Class 2 obesity due to excess calories with body mass index (BMI) of 39.0 to 39.9 in adult, unspecified whether serious comorbidity presen  12/1/2021  Unfortunately instead of losing weight patient gained about 5 pounds since the last visit.  He is not immune to hepatitis a and B hep C antibody is negative.  BRENDA, anti-smooth muscle antibody, antimitochondrial antibody negative.  Iron studies studies reveal a normal iron saturation ruling out hemochromatosis.  Alpha-1 antitrypsin levels were normal.  His New FibroSure test done revealed F1-F2 fibrosis, N1 steatohepatitis, S3 steatosis which is not consistent with the radiologic findings and the liver biopsy findings.  However patient lab studies, CT scan findings and liver biopsy are more in favor of cirrhosis  Advised to take a combined hep a and B vaccine at PCPs office or health center.  Counseled on avoid gaining any further weight.  We will follow-up with repeat labs in 6 months time.  Ultrasound abdomen for HCC surveillance in January 2022 and EGD in 2023 for esophageal variceal surveillance.  Continue vitamin E for now    8/30/2021  Patient history, prior work-up, clinical finding is not more in favor of compensated liver cirrhosis secondary to NEW without ascites  He had a elevated liver enzymes over 8 years now.  Last blood work on record was in 2014 with elevated liver enzymes and thrombocytopenia.  His recent EGD done in July 2021 by Dr. Diaz did not reveal any esophageal varices.  Ultrasound abdomen in July 2021 did not reveal any liver lesions.  Liver biopsy done during the laparoscopic cholecystectomy revealed a grade 2 through 3 steatohepatitis with the stage 4 fibrosis suggesting cirrhosis.  He is  nonalcoholic and non-smoker.     We had a detailed discussion regarding her dietary changes and lifestyle changes to prevent the course of the disease and progression of the disease.   As per patient he lost about 30 pounds over the 1 year time.  I have encouraged him to continue to lose weight and keep some realistic goal to lose at least 20 to 30 pounds over the next 6 months time.  Patient was on Metformin that would have been helpful however he developed intolerance and that has been stopped.  We had a discussion regarding the risk and benefits of taking high risk vitamin E .  Patient is agreeable to proceed with the therapy and advised to take vitamin E 400 units p.o. twice daily.        4. Gastroesophageal reflux disease without esophagitis  12/1/2021  His Protonix dose was reduced to 20 mg p.o. daily and he is not having any significant reflux symptoms now, will make this as demand in the next 3 months time.      5. Personal history of colonic polyps  12/1/2021  Colonoscopy done on 11/23/2021 did not reveal any colon polyps.  No endoscopic signs of any colitis chronic biopsies and terminal ileum biopsies were normal. Due to his advanced polyp removed in the past will repeat colonoscopy in 5 years time in 2026  5 yearsa due to large polyp removed before. As per patient he had a colonoscopy done a year ago at [place].  He had 5 polyps removed one of them was large and was advised to repeat colonoscopy in 1 year time.       6.  Change in bowel habit  12/1/2021  Recent colonoscopy done did not reveal any signs of colitis colonic biopsies and ileal biopsies were normal.  His abdominal pain and diarrhea has resolved now.  We will closely monitor for now.  Some of his symptoms most likely associated with his diabetes mellitus.  He had a CT scan abdomen pelvis done in July 2021 and 2020.  He also had ultrasound abdomen in July 2020.  He had a recent EGD and had a last colonoscopy a year ago.  His duodenal biopsy  negative for celiac disease         Follow Up:   No follow-ups on file.    Yaneth Bell MD  [facility] [place]  12/1/2021  15:34 EST     Please note that portions of this note may have been completed with a voice recognition program.

## 2022-01-10 ENCOUNTER — HOSPITAL ENCOUNTER (OUTPATIENT)
Dept: ULTRASOUND IMAGING | Facility: HOSPITAL | Age: 52
Discharge: HOME | End: 2022-01-10
Admitting: INTERNAL MEDICINE

## 2022-01-10 DIAGNOSIS — K74.60 CIRRHOSIS OF LIVER WITHOUT ASCITES, UNSPECIFIED HEPATIC CIRRHOSIS TYPE: ICD-10-CM

## 2022-01-10 DIAGNOSIS — K75.81 NASH (NONALCOHOLIC STEATOHEPATITIS): ICD-10-CM

## 2022-01-10 PROCEDURE — 76700 US EXAM ABDOM COMPLETE: CPT

## 2022-01-14 DIAGNOSIS — K75.81 NASH (NONALCOHOLIC STEATOHEPATITIS): Primary | ICD-10-CM

## 2022-01-14 DIAGNOSIS — K76.9 LIVER LESION: ICD-10-CM

## 2022-01-14 DIAGNOSIS — K74.60 CIRRHOSIS OF LIVER WITHOUT ASCITES, UNSPECIFIED HEPATIC CIRRHOSIS TYPE: ICD-10-CM

## 2022-01-17 ENCOUNTER — TELEPHONE (OUTPATIENT)
Dept: GASTROENTEROLOGY | Facility: CLINIC | Age: 52
End: 2022-01-17

## 2022-01-17 NOTE — TELEPHONE ENCOUNTER
----- Message from Yaneth Bell MD sent at 1/14/2022  4:07 PM EST -----    Let him know that his ultrasound liver showed some liver lesion.   He needs an MRI scan to further clarify that has been ordered. He also needs a lab work that I have ordered.

## 2022-02-07 ENCOUNTER — HOSPITAL ENCOUNTER (OUTPATIENT)
Dept: MRI IMAGING | Facility: HOSPITAL | Age: 52
End: 2022-02-07

## 2022-02-20 ENCOUNTER — APPOINTMENT (OUTPATIENT)
Dept: CT IMAGING | Facility: HOSPITAL | Age: 52
End: 2022-02-20

## 2022-02-20 ENCOUNTER — HOSPITAL ENCOUNTER (EMERGENCY)
Facility: HOSPITAL | Age: 52
Discharge: HOME | End: 2022-02-20
Attending: EMERGENCY MEDICINE | Admitting: EMERGENCY MEDICINE

## 2022-02-20 ENCOUNTER — APPOINTMENT (OUTPATIENT)
Dept: GENERAL RADIOLOGY | Facility: HOSPITAL | Age: 52
End: 2022-02-20

## 2022-02-20 VITALS
OXYGEN SATURATION: 97 % | SYSTOLIC BLOOD PRESSURE: 148 MMHG | TEMPERATURE: 99.1 F | DIASTOLIC BLOOD PRESSURE: 94 MMHG | WEIGHT: 290 LBS | HEART RATE: 86 BPM | RESPIRATION RATE: 18 BRPM | HEIGHT: 73 IN | BODY MASS INDEX: 38.43 KG/M2

## 2022-02-20 DIAGNOSIS — I85.01 BLEEDING ESOPHAGEAL VARICES, UNSPECIFIED ESOPHAGEAL VARICES TYPE: ICD-10-CM

## 2022-02-20 DIAGNOSIS — Z87.19 HISTORY OF CIRRHOSIS OF LIVER: ICD-10-CM

## 2022-02-20 DIAGNOSIS — R10.11 RIGHT UPPER QUADRANT ABDOMINAL PAIN: Primary | ICD-10-CM

## 2022-02-20 DIAGNOSIS — M25.511 ACUTE PAIN OF RIGHT SHOULDER: ICD-10-CM

## 2022-02-20 DIAGNOSIS — R19.7 DIARRHEA, UNSPECIFIED TYPE: ICD-10-CM
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
## 2022-02-20 LAB
ALBUMIN SERPL-MCNC: 3.7 G/DL (ref 3.5–5.2)
ALBUMIN/GLOB SERPL: 1.1 G/DL
ALP SERPL-CCNC: 124 U/L (ref 39–117)
ALT SERPL W P-5'-P-CCNC: 82 U/L (ref 1–41)
ANION GAP SERPL CALCULATED.3IONS-SCNC: 11.4 MMOL/L (ref 5–15)
APTT PPP: 29.1 SECONDS (ref 24.5–37.2)
AST SERPL-CCNC: 47 U/L (ref 1–40)
BASOPHILS # BLD AUTO: 0.03 10*3/MM3 (ref 0–0.2)
BASOPHILS NFR BLD AUTO: 0.4 % (ref 0–1.5)
BILIRUB SERPL-MCNC: 0.9 MG/DL (ref 0–1.2)
BILIRUB UR QL STRIP: ABNORMAL
BUN SERPL-MCNC: 14 MG/DL (ref 6–20)
BUN/CREAT SERPL: 16.5 (ref 7–25)
CALCIUM SPEC-SCNC: 8.8 MG/DL (ref 8.6–10.5)
CHLORIDE SERPL-SCNC: 102 MMOL/L (ref 98–107)
CLARITY UR: CLEAR
CO2 SERPL-SCNC: 22.6 MMOL/L (ref 22–29)
COLOR UR: ABNORMAL
CREAT SERPL-MCNC: 0.85 MG/DL (ref 0.76–1.27)
DEPRECATED RDW RBC AUTO: 44 FL (ref 37–54)
EOSINOPHIL # BLD AUTO: 0.03 10*3/MM3 (ref 0–0.4)
EOSINOPHIL NFR BLD AUTO: 0.4 % (ref 0.3–6.2)
ERYTHROCYTE [DISTWIDTH] IN BLOOD BY AUTOMATED COUNT: 13.6 % (ref 12.3–15.4)
GFR SERPL CREATININE-BSD FRML MDRD: 94 ML/MIN/1.73
GLOBULIN UR ELPH-MCNC: 3.4 GM/DL
GLUCOSE SERPL-MCNC: 219 MG/DL (ref 65–99)
GLUCOSE UR STRIP-MCNC: ABNORMAL MG/DL
HCT VFR BLD AUTO: 48.7 % (ref 37.5–51)
HGB BLD-MCNC: 17.3 G/DL (ref 13–17.7)
HGB UR QL STRIP.AUTO: NEGATIVE
IMM GRANULOCYTES # BLD AUTO: 0.03 10*3/MM3 (ref 0–0.05)
IMM GRANULOCYTES NFR BLD AUTO: 0.4 % (ref 0–0.5)
INR PPP: 1 (ref 0.9–1.1)
KETONES UR QL STRIP: ABNORMAL
LEUKOCYTE ESTERASE UR QL STRIP.AUTO: NEGATIVE
LIPASE SERPL-CCNC: 17 U/L (ref 13–60)
LYMPHOCYTES # BLD AUTO: 0.84 10*3/MM3 (ref 0.7–3.1)
LYMPHOCYTES NFR BLD AUTO: 12.4 % (ref 19.6–45.3)
MCH RBC QN AUTO: 31.7 PG (ref 26.6–33)
MCHC RBC AUTO-ENTMCNC: 35.5 G/DL (ref 31.5–35.7)
MCV RBC AUTO: 89.2 FL (ref 79–97)
MONOCYTES # BLD AUTO: 0.26 10*3/MM3 (ref 0.1–0.9)
MONOCYTES NFR BLD AUTO: 3.8 % (ref 5–12)
NEUTROPHILS NFR BLD AUTO: 5.58 10*3/MM3 (ref 1.7–7)
NEUTROPHILS NFR BLD AUTO: 82.6 % (ref 42.7–76)
NITRITE UR QL STRIP: NEGATIVE
NRBC BLD AUTO-RTO: 0 /100 WBC (ref 0–0.2)
PH UR STRIP.AUTO: 5.5 [PH] (ref 5–8)
PLATELET # BLD AUTO: 99 10*3/MM3 (ref 140–450)
PMV BLD AUTO: 11.3 FL (ref 6–12)
POTASSIUM SERPL-SCNC: 4.1 MMOL/L (ref 3.5–5.2)
PROT SERPL-MCNC: 7.1 G/DL (ref 6–8.5)
PROT UR QL STRIP: ABNORMAL
PROTHROMBIN TIME: 13.7 SECONDS (ref 12–15.1)
RBC # BLD AUTO: 5.46 10*6/MM3 (ref 4.14–5.8)
SODIUM SERPL-SCNC: 136 MMOL/L (ref 136–145)
SP GR UR STRIP: >=1.03 (ref 1–1.03)
TROPONIN T SERPL-MCNC: <0.01 NG/ML (ref 0–0.03)
UROBILINOGEN UR QL STRIP: ABNORMAL
WBC NRBC COR # BLD: 6.77 10*3/MM3 (ref 3.4–10.8)

## 2022-02-20 PROCEDURE — 73030 X-RAY EXAM OF SHOULDER: CPT

## 2022-02-20 PROCEDURE — 93005 ELECTROCARDIOGRAM TRACING: CPT | Performed by: PHYSICIAN ASSISTANT

## 2022-02-20 PROCEDURE — 83690 ASSAY OF LIPASE: CPT | Performed by: PHYSICIAN ASSISTANT

## 2022-02-20 PROCEDURE — 74177 CT ABD & PELVIS W/CONTRAST: CPT

## 2022-02-20 PROCEDURE — 84484 ASSAY OF TROPONIN QUANT: CPT | Performed by: PHYSICIAN ASSISTANT

## 2022-02-20 PROCEDURE — 25010000002 IOPAMIDOL 61 % SOLUTION: Performed by: EMERGENCY MEDICINE

## 2022-02-20 PROCEDURE — 81003 URINALYSIS AUTO W/O SCOPE: CPT | Performed by: PHYSICIAN ASSISTANT

## 2022-02-20 PROCEDURE — 99283 EMERGENCY DEPT VISIT LOW MDM: CPT

## 2022-02-20 PROCEDURE — 70450 CT HEAD/BRAIN W/O DYE: CPT

## 2022-02-20 PROCEDURE — 80053 COMPREHEN METABOLIC PANEL: CPT | Performed by: PHYSICIAN ASSISTANT

## 2022-02-20 PROCEDURE — 85610 PROTHROMBIN TIME: CPT | Performed by: PHYSICIAN ASSISTANT

## 2022-02-20 PROCEDURE — 85025 COMPLETE CBC W/AUTO DIFF WBC: CPT | Performed by: PHYSICIAN ASSISTANT

## 2022-02-20 PROCEDURE — 71275 CT ANGIOGRAPHY CHEST: CPT

## 2022-02-20 PROCEDURE — 85730 THROMBOPLASTIN TIME PARTIAL: CPT | Performed by: PHYSICIAN ASSISTANT

## 2022-02-20 RX ORDER — SODIUM CHLORIDE 0.9 % (FLUSH) 0.9 %
10 SYRINGE (ML) INJECTION AS NEEDED
Status: DISCONTINUED | OUTPATIENT
Start: 2022-02-20 | End: 2022-02-21 | Stop reason: HOSPADM

## 2022-02-20 RX ADMIN — SODIUM CHLORIDE 500 ML: 9 INJECTION, SOLUTION INTRAVENOUS at 20:41

## 2022-02-20 RX ADMIN — IOPAMIDOL 100 ML: 612 INJECTION, SOLUTION INTRAVENOUS at 21:54

## 2022-02-21 NOTE — DISCHARGE INSTRUCTIONS
They see a small amount of esophageal varices on your CT scan.  Since you did not want to be made, you have had to follow-up very closely with your GI provider.  Call Dr. Bell for earliest available appointment.  Take your medications as directed.  Follow the primary care provider as needed.  They may need additional work-up if your symptoms persist.  Drink plenty of fluids to stay well-hydrated.  Follow-up with your orthopedist if your shoulder pain persist or worsens, may need additional imaging.  Return to the ER for any change, worsening symptoms, or any additional concerns including but not limited to severe or worsening abdominal pain, additional episodes of hematemesis, bloody bowel movements, dizziness, syncope, chest pain, shortness of breath

## 2022-02-21 NOTE — ED PROVIDER NOTES
Subjective   Patient is a 52-year-old male with a history of anxiety, depression, asthma, right-sided DVT on Plavix, nonalcoholic cirrhosis, chronic kidney disease, GERD, diabetes, kidney stones, and sleep apnea presenting to the ER for evaluation of multiple symptoms.  Patient states last night he was coming down his stairs.  He states he felt like he was going to fast and ended up hitting his right side and shoulder into the banister.  States he has had pain in his shoulder since then and the pain in his right upper abdomen.  He describes the pain is abdomen is burning.  He states he did have one episode of emesis this morning that appeared to be tinged with some blood.  He states he is also had diarrhea today.  He denies any blood in his stools.  He states his cirrhosis was due to medications he was on in the past, denies any alcohol use.  Denies any syncope, known head trauma, vision loss or changes, extremity weakness, chest pain or shortness of breath, dysuria, hematuria, or any other symptoms.  He denies any known fever, had a temp of 99.5 on arrival here today.          Review of Systems   Constitutional: Negative for chills and fever.   HENT: Negative.    Eyes: Negative.    Respiratory: Negative.    Cardiovascular: Negative.    Gastrointestinal: Positive for abdominal pain, diarrhea and vomiting. Negative for nausea.   Genitourinary: Negative.    Musculoskeletal: Positive for arthralgias and myalgias.   Skin: Negative.    Allergic/Immunologic: Negative for immunocompromised state.   Neurological: Negative.    Psychiatric/Behavioral: Negative.        Past Medical History:   Diagnosis Date   • Ankle fracture, left    • Anxiety    • Anxiety and depression    • Asthma    • Back pain    • Blood clot in vein 01/2021    right leg-states has resolved now   • Cirrhosis (HCC)    • CKD (chronic kidney disease)     pt denies 7/6/21   • Depression    • Diabetes mellitus (HCC)    • Fatty liver    • GERD (gastroesophageal  "reflux disease)    • H/O echocardiogram 02/2021   • History of nuclear stress test 01/2020    \"I did ok with it\"   • Hypertension    • Kidney stone    • Myocardial infarction (HCC)     MI IN 2006 (stent x 1), 2012 (no stents), 8/2/2020 (no stents with last MI).  Just saw Dr. Duenas recently for a f/u.  (assessed 11/22/21)   • Pilonidal cyst    • Plantar fasciitis     bilateral   • Seasonal allergies    • Sleep apnea     CPAP-\"I don't wear it every night\"   • Wears glasses    • Wrist fracture, left        Allergies   Allergen Reactions   • Morphine And Related Anaphylaxis   • Simvastatin Other (See Comments)     Liver problems   • Penicillins Hives   • Pradaxa [Dabigatran Etexilate Mesylate] Hives       Past Surgical History:   Procedure Laterality Date   • APPENDECTOMY     • CARDIAC CATHETERIZATION  2006    stent x 1   • CARDIAC CATHETERIZATION  2012    no stents   • CHOLECYSTECTOMY  08/23/2021   • COLONOSCOPY     • COLONOSCOPY N/A 11/23/2021    Procedure: COLONOSCOPY with biopsies;  Surgeon: Yaneth Bell MD;  Location: [facility] ENDOSCOPY;  Service: Gastroenterology;  Laterality: N/A;   • ENDOSCOPY     • ENDOSCOPY N/A 7/7/2021    Procedure: ESOPHAGOGASTRODUODENOSCOPY WITH BOPSIES;  Surgeon: Arjun Delgadillo MD;  Location: [facility] ENDOSCOPY;  Service: Gastroenterology;  Laterality: N/A;   • PLANTAR FASCIA RELEASE      right   • SKIN BIOPSY      benign       Family History   Problem Relation Age of Onset   • Diabetes Mother    • COPD Mother    • Heart failure Mother    • Heart disease Father    • Lung cancer Father        Social History     Socioeconomic History   • Marital status:    Tobacco Use   • Smoking status: Never Smoker   • Smokeless tobacco: Never Used   Vaping Use   • Vaping Use: Never used   Substance and Sexual Activity   • Alcohol use: Not Currently     Comment: maybe 1 drink per year (rarely)   • Drug use: No   • Sexual activity: Defer           Objective   Physical Exam  Vitals and nursing " "note reviewed.     /94 (BP Location: Right arm, Patient Position: Sitting)   Pulse 87   Temp 99.5 °F (37.5 °C) (Oral)   Resp 16   Ht 185.4 cm (73\")   Wt 132 kg (290 lb)   SpO2 98%   BMI 38.26 kg/m²     GEN: No acute distress, sitting up in stretcher.  He is awake and alert.  Does not appear septic or toxic.  He is answering questions appropriately.  Head: Normocephalic, atraumatic  Eyes: EOM intact  ENT: Mask in place per protocol   Chest: Nontender to palpation  Cardiovascular: Regular rate and rhythm, no chest wall tenderness  Lungs: Clear to auscultation bilaterally without adventitious sounds  Abdomen: Large but nondistended.  No obvious ecchymosis.  Patient does have some tenderness in the epigastric region of the abdomen but no other focal guarding  Extremities: No edema, normal appearance, patient has decreased range of motion of his right shoulder secondary to pain but he can abduct to almost 90 degrees.  There is no specific focal tenderness of the shoulder, elbow or wrist.  Normal range of motion in all other extremities..  Radial and posterior tibialis pulses are 2+ and equal  Neuro: GCS 15  Psych: Mood and affect are appropriate    Procedures           ED Course  ED Course as of 02/20/22 2323   Sun Feb 20, 2022 2048 EKG interpreted by me: Sinus rhythm, normal rate, no acute ST/T wave changes, some low-voltage QRS complexes, this is an atypical EKG [MP]   2050 WBC: 6.77 [LA]   2050 Hemoglobin: 17.3 [LA]   2050 Platelets(!): 99 [LA]   2050 Neutrophil Rel %(!): 82.6 [LA]   2050 Lymphocyte Rel %(!): 12.4 [LA]   2050 Monocyte Rel %(!): 3.8 [LA]   2051 Eosinophil Rel %: 0.4 [LA]   2051 Basophil Rel %: 0.4 [LA]   2051 Immature Granulocyte Rel %: 0.4 [LA]   2051 Neutrophils Absolute: 5.58 [LA]   2051 Lymphocytes Absolute: 0.84 [LA]   2051 Monocytes Absolute: 0.26 [LA]   2051 Eosinophils Absolute: 0.03 [LA]   2051 Basophils Absolute: 0.03 [LA]   2051 Immature Grans, Absolute: 0.03 [LA]   2051 nRBC: " 0.0 [LA]   2051 Color, UA(!): Dark Yellow [LA]   2051 Appearance, UA: Clear [LA]   2051 pH, UA: 5.5 [LA]   2051 Specific Gravity, UA: >=1.030 [LA]   2051 Glucose(!): >=1000 mg/dL (3+) [LA]   2051 Ketones, UA(!): Trace [LA]   2051 Bilirubin, UA(!): Small (1+) [LA]   2051 Blood, UA: Negative [LA]   2051 Protein, UA(!): Trace [LA]   2051 Leukocytes, UA: Negative [LA]   2051 Nitrite, UA: Negative [LA]   2051 Urobilinogen, UA: 1.0 E.U./dL [LA]   2112 Glucose(!): 219 [LA]   2112 BUN: 14 [LA]   2112 Creatinine: 0.85 [LA]   2112 Sodium: 136 [LA]   2112 Potassium: 4.1 [LA]   2112 Chloride: 102 [LA]   2112 CO2: 22.6 [LA]   2112 Calcium: 8.8 [LA]   2112 Total Protein: 7.1 [LA]   2112 Albumin: 3.70 [LA]   2112 ALT (SGPT)(!): 82 [LA]   2112 AST (SGOT)(!): 47 [LA]   2112 Alkaline Phosphatase(!): 124 [LA]   2112 Total Bilirubin: 0.9 [LA]   2112 eGFR Non  Am: 94 [LA]   2112 Globulin: 3.4 [LA]   2112 A/G Ratio: 1.1 [LA]   2112 BUN/Creatinine Ratio: 16.5 [LA]   2112 Anion Gap: 11.4 [LA]   2225 Narrative & Impression  FINAL REPORT     TECHNIQUE:  Multiple axial CT images were performed from the foramen magnum  to the vertex. This study was performed with techniques to keep  radiation doses as low as reasonably achievable (ALARA).  Individualized dose reduction techniques using automated  exposure control or adjustment of mA and/or kV according to the  patient's size were employed.     CLINICAL HISTORY:  Presyncope, vomiting     FINDINGS:  No acute intracranial hemorrhage or large acute cortical  infarct.  The brain volume is normal for patient's age.  Ventricles are normal in size and configuration.  No midline  shift.  The basal cisterns are patent.  No skull fracture.  The  visualized paranasal sinuses and mastoid air cells are clear.     IMPRESSION:  No acute intracranial hemorrhage or large acute cortical infarct.     Authenticated by Johnathan Parker MD on 02/20/2022 10:12:20 PM [LA]   2746 FINAL  REPORT     TECHNIQUE:  Multiple axial CT images were obtained through the chest  following IV contrast using a CTA/PE protocol.  3D/MIP  reconstruction images were also performed. This study was  performed with techniques to keep radiation doses as low as  reasonably achievable (ALARA). Individualized dose reduction  techniques using automated exposure control or adjustment of mA  and/or kV according to the patient's size were employed.     CLINICAL HISTORY:  Hemoptysis/hematemesis, fall, right side pain     FINDINGS:  PAs and aorta: No pulmonary embolus.  Thoracic aorta is  unremarkable.  No significant coronary artery calcifications.  Heart/mediastinum: No evidence for right heart strain.  No  pericardial effusion.  The heart is normal in size.  Lungs: Mild  atelectasis.  Otherwise the lungs are clear.  Lymph nodes: No  pathologically enlarged thoracic lymph nodes.  Pleura: No  pneumothorax or pleural effusion.  Chest Wall: No chest wall  contusion.  Bones: No acute fracture.     IMPRESSION:  No pulmonary embolus.  No acute findings in the chest.     Authenticated by Johnathan Parker MD on 02/20/2022 10:23:04 PM [LA]   1666 Narrative & Impression  FINAL REPORT     TECHNIQUE:  Axial CT images were performed from the lung bases through the  symphysis pubis after the administration of intravenous  contrast.  This study was performed with techniques to keep  radiation doses as low as reasonably achievable (ALARA).  Individualized dose reduction techniques using automated  exposure control or adjustment of mA and/or kV according to the  patient's size were employed.     CLINICAL HISTORY:  Fall, right upper abdominal pain, hematemesis/hemoptysis     FINDINGS:  ABDOMEN/PELVIS:  Liver, gallbladder and bile ducts: There is  cirrhosis.  There has been a prior cholecystectomy.  There is no  definite biliary duct dilatation.  Adrenal glands: The adrenal  glands are morphologically unremarkable without suspicious  lesion.   Kidneys, ureter and urinary bladder: No suspicious  renal lesion.  No hydronephrosis.  Urinary bladder is  unremarkable.  Spleen: The spleen is enlarged measuring up to 15  cm.  Pancreas: The pancreas is grossly unremarkable.  GI systems  and mesentery: There are small esophageal varices.  No evidence  of bowel obstruction.  The appendix is not visualized but there  are no secondary signs of appendicitis.    No significant  mesenteric inflammation.  Lymph nodes: No definite  pathologically enlarged abdominal or pelvic lymph nodes present.  Vessels: The aorta and abdominal arteries are grossly patent.  The IVC and portal vein are patent and grossly unremarkable.  Peritoneum: No free intraperitoneal fluid or pneumoperitoneum.  Pelvic viscera: No acute findings.  Body wall: No body wall  contusion. No significant body wall hernias.  Bones: No acute  fracture.     IMPRESSION:  Cirrhosis and splenomegaly.  Portal hypertension and small  esophageal varices.     Authenticated by Johnathan Parker MD on 02/20/2022 10:53:37 PM [LA]   0611 Discussed with Dr. Jones.  Given patient's history and episode of hematemesis with findings of esophageal varices on CT scan we recommended admission to the hospital.  Patient was adamant that he was not going to stay in the hospital and refused admission.  He states he would return if any symptoms worsen.  He has not had any additional episodes of hematemesis.  His vital signs are stable here.  He does have follow-up with Dr. Wallace, discussed that he may need to have his shoulder reassessed since her could be some tendon or ligament injury.  He verbalized understanding and was in agreement with this plan of care. [LA]      ED Course User Index  [LA] Gail Tariq PA-C  [MP] Jose Enrique Jones MD                                                 MDM  Number of Diagnoses or Management Options  Acute pain of right shoulder  Bleeding esophageal varices, unspecified esophageal varices type  (HCC)  Diarrhea, unspecified type  Fall, initial encounter  History of cirrhosis of liver  Right upper quadrant abdominal pain  Diagnosis management comments: On arrival, patient is stable.  He has a temp of 99.5 but no other significant abnormalities.  Differential could include GI bleeding, colitis, peptic ulcer disease, electro abnormalities, shoulder fracture dislocation, cardiac dysrhythmia, and other concerns.  Patient is on anticoagulation.  He has had 1 episode of a blood-tinged emesis earlier today.  Will obtain basic labs including, coags, urinalysis, lipase.  Will obtain EKG.  Will likely obtain CT of the head, chest and abdomen pelvis, x-ray of the right shoulder.  Did review previous charts, patient had an upper endoscopy within the last 6 months with no significant findings.    Patient's labs are stable.  His hemoglobin is stable, no anemia.  No significant leukocytosis.  No elevation of troponin, his liver enzymes are mildly elevated but appear similar in comparison to previous his urine had glucose and bilirubin but no signs of infection EKG was noted by the attending.  CT scans were read by the radiologist.  CT of the head and chest were unremarkable.  CT the abdomen pelvis revealed cirrhosis and splenomegaly, portal hypertension and esophageal varices.  X-ray of the shoulder did not reveal any acute fracture.  Given patient's history and the episode of hematemesis, believe patient should be at least monitored in the hospital. Discussed with Dr. Jones as well.  I discussed this with the patient and he did refuse admission.  His vital signs been stable and he has had no additional episodes of emesis.  Given this, we will discharge him with very close follow-up with his GI physician and primary care provider with very strict return precautions.  He verbalized understanding and was in agreement with this plan of care.       Amount and/or Complexity of Data Reviewed  Clinical lab tests: reviewed and  ordered  Tests in the radiology section of CPT®: reviewed and ordered  Discussion of test results with the performing providers: yes  Decide to obtain previous medical records or to obtain history from someone other than the patient: yes  Review and summarize past medical records: yes  Discuss the patient with other providers: yes    Risk of Complications, Morbidity, and/or Mortality  Presenting problems: moderate  Diagnostic procedures: moderate  Management options: low    Patient Progress  Patient progress: stable      Final diagnoses:   Right upper quadrant abdominal pain   History of cirrhosis of liver   Bleeding esophageal varices, unspecified esophageal varices type (HCC)   Fall, initial encounter   Acute pain of right shoulder   Diarrhea, unspecified type       ED Disposition  ED Disposition     ED Disposition Condition Comment    Discharge Stable           Vilma Cassidy MD  [address] [facility] [address] [phone]    Schedule an appointment as soon as possible for a visit       Yaneth Bell MD  [address] [facility] [address]  [address]  [facility] [address] [phone]    Schedule an appointment as soon as possible for a visit            Medication List      No changes were made to your prescriptions during this visit.          Gail Tariq PA-C  02/20/22 5180

## 2022-03-01 ENCOUNTER — LAB (OUTPATIENT)
Dept: LAB | Facility: HOSPITAL | Age: 52
End: 2022-03-01

## 2022-03-01 ENCOUNTER — HOSPITAL ENCOUNTER (OUTPATIENT)
Dept: MRI IMAGING | Facility: HOSPITAL | Age: 52
Discharge: HOME | End: 2022-03-01

## 2022-03-01 DIAGNOSIS — K74.60 CIRRHOSIS OF LIVER WITHOUT ASCITES, UNSPECIFIED HEPATIC CIRRHOSIS TYPE: ICD-10-CM

## 2022-03-01 DIAGNOSIS — K76.9 LIVER LESION: ICD-10-CM

## 2022-03-01 DIAGNOSIS — K75.81 NASH (NONALCOHOLIC STEATOHEPATITIS): ICD-10-CM

## 2022-03-01 LAB
ALBUMIN SERPL-MCNC: 3.6 G/DL (ref 3.5–5.2)
ALBUMIN/GLOB SERPL: 1.1 G/DL
ALP SERPL-CCNC: 118 U/L (ref 39–117)
ALT SERPL W P-5'-P-CCNC: 116 U/L (ref 1–41)
ANION GAP SERPL CALCULATED.3IONS-SCNC: 9.3 MMOL/L (ref 5–15)
AST SERPL-CCNC: 74 U/L (ref 1–40)
BASOPHILS # BLD AUTO: 0.04 10*3/MM3 (ref 0–0.2)
BASOPHILS NFR BLD AUTO: 0.7 % (ref 0–1.5)
BILIRUB SERPL-MCNC: 0.5 MG/DL (ref 0–1.2)
BUN SERPL-MCNC: 9 MG/DL (ref 6–20)
BUN/CREAT SERPL: 11 (ref 7–25)
CALCIUM SPEC-SCNC: 8.8 MG/DL (ref 8.6–10.5)
CHLORIDE SERPL-SCNC: 106 MMOL/L (ref 98–107)
CO2 SERPL-SCNC: 23.7 MMOL/L (ref 22–29)
CREAT SERPL-MCNC: 0.82 MG/DL (ref 0.76–1.27)
DEPRECATED RDW RBC AUTO: 44 FL (ref 37–54)
EGFRCR SERPLBLD CKD-EPI 2021: 104.4 ML/MIN/1.73
EOSINOPHIL # BLD AUTO: 0.1 10*3/MM3 (ref 0–0.4)
EOSINOPHIL NFR BLD AUTO: 1.6 % (ref 0.3–6.2)
ERYTHROCYTE [DISTWIDTH] IN BLOOD BY AUTOMATED COUNT: 12.9 % (ref 12.3–15.4)
GLOBULIN UR ELPH-MCNC: 3.3 GM/DL
GLUCOSE SERPL-MCNC: 190 MG/DL (ref 65–99)
HCT VFR BLD AUTO: 50.1 % (ref 37.5–51)
HGB BLD-MCNC: 17 G/DL (ref 13–17.7)
IMM GRANULOCYTES # BLD AUTO: 0 10*3/MM3 (ref 0–0.05)
IMM GRANULOCYTES NFR BLD AUTO: 0 % (ref 0–0.5)
INR PPP: 0.96 (ref 0.9–1.1)
LYMPHOCYTES # BLD AUTO: 1.89 10*3/MM3 (ref 0.7–3.1)
LYMPHOCYTES NFR BLD AUTO: 30.9 % (ref 19.6–45.3)
MCH RBC QN AUTO: 31.7 PG (ref 26.6–33)
MCHC RBC AUTO-ENTMCNC: 33.9 G/DL (ref 31.5–35.7)
MCV RBC AUTO: 93.5 FL (ref 79–97)
MONOCYTES # BLD AUTO: 0.33 10*3/MM3 (ref 0.1–0.9)
MONOCYTES NFR BLD AUTO: 5.4 % (ref 5–12)
NEUTROPHILS NFR BLD AUTO: 3.76 10*3/MM3 (ref 1.7–7)
NEUTROPHILS NFR BLD AUTO: 61.4 % (ref 42.7–76)
NRBC BLD AUTO-RTO: 0 /100 WBC (ref 0–0.2)
PLATELET # BLD AUTO: 166 10*3/MM3 (ref 140–450)
PMV BLD AUTO: 11.4 FL (ref 6–12)
POTASSIUM SERPL-SCNC: 4.6 MMOL/L (ref 3.5–5.2)
PROT SERPL-MCNC: 6.9 G/DL (ref 6–8.5)
PROTHROMBIN TIME: 13.3 SECONDS (ref 12–15.1)
RBC # BLD AUTO: 5.36 10*6/MM3 (ref 4.14–5.8)
SODIUM SERPL-SCNC: 139 MMOL/L (ref 136–145)
WBC NRBC COR # BLD: 6.12 10*3/MM3 (ref 3.4–10.8)

## 2022-03-01 PROCEDURE — 0 GADOBENATE DIMEGLUMINE 529 MG/ML SOLUTION: Performed by: INTERNAL MEDICINE

## 2022-03-01 PROCEDURE — 85025 COMPLETE CBC W/AUTO DIFF WBC: CPT

## 2022-03-01 PROCEDURE — 82105 ALPHA-FETOPROTEIN SERUM: CPT | Performed by: INTERNAL MEDICINE

## 2022-03-01 PROCEDURE — 74183 MRI ABD W/O CNTR FLWD CNTR: CPT

## 2022-03-01 PROCEDURE — 80053 COMPREHEN METABOLIC PANEL: CPT

## 2022-03-01 PROCEDURE — 85610 PROTHROMBIN TIME: CPT

## 2022-03-01 PROCEDURE — A9577 INJ MULTIHANCE: HCPCS | Performed by: INTERNAL MEDICINE

## 2022-03-01 RX ADMIN — GADOBENATE DIMEGLUMINE 27 ML: 529 INJECTION, SOLUTION INTRAVENOUS at 09:46

## 2022-03-02 ENCOUNTER — OFFICE VISIT (OUTPATIENT)
Dept: GASTROENTEROLOGY | Facility: CLINIC | Age: 52
End: 2022-03-02

## 2022-03-02 VITALS
DIASTOLIC BLOOD PRESSURE: 84 MMHG | WEIGHT: 288 LBS | SYSTOLIC BLOOD PRESSURE: 130 MMHG | TEMPERATURE: 97.8 F | HEIGHT: 73 IN | BODY MASS INDEX: 38.17 KG/M2

## 2022-03-02 DIAGNOSIS — R19.7 DIARRHEA, UNSPECIFIED TYPE: ICD-10-CM

## 2022-03-02 DIAGNOSIS — R11.0 NAUSEA: ICD-10-CM

## 2022-03-02 DIAGNOSIS — Z01.818 PREOP TESTING: Primary | ICD-10-CM

## 2022-03-02 DIAGNOSIS — K92.0 HEMATEMESIS WITH NAUSEA: Primary | ICD-10-CM

## 2022-03-02 DIAGNOSIS — R10.11 RIGHT UPPER QUADRANT ABDOMINAL PAIN: ICD-10-CM

## 2022-03-02 PROCEDURE — 99214 OFFICE O/P EST MOD 30 MIN: CPT | Performed by: INTERNAL MEDICINE

## 2022-03-02 RX ORDER — SODIUM CHLORIDE 9 MG/ML
70 INJECTION, SOLUTION INTRAVENOUS CONTINUOUS PRN
Status: CANCELLED | OUTPATIENT
Start: 2022-03-02

## 2022-03-02 RX ORDER — MONTELUKAST SODIUM 4 MG/1
1 TABLET, CHEWABLE ORAL 2 TIMES DAILY
Qty: 60 TABLET | Refills: 5 | Status: SHIPPED | OUTPATIENT
Start: 2022-03-02 | End: 2022-05-16

## 2022-03-02 RX ORDER — SEMAGLUTIDE 1.34 MG/ML
0.5 INJECTION, SOLUTION SUBCUTANEOUS WEEKLY
COMMUNITY

## 2022-03-02 NOTE — PROGRESS NOTES
Follow Up Note     Date: 2022   Patient Name: Kb Minaya  MRN: [id number]  : 1970     Referring Physician: Vilma Cassidy MD    Chief Complaint:    Chief Complaint   Patient presents with   • Follow-up   • Abdominal Pain       Interval History:   3/2/2022  Kb Minaya is a 52 y.o. male who is here today for follow up after his recent visit to the emergency room.  He was in the emergency room a week ago with complaints of nausea vomiting and right side abdominal pain and coffee-ground emesis.  He states that nausea improved however he still has right-sided abdominal pain..  Denies any blood in the stool.      2021  Kb Minaya is a 51 y.o. male who is here today for follow up for follow-up of his cirrhosis.  He states that his abdominal pain and diarrhea has resolved now.  He is here for follow-up after his recent colonoscopy.     2021  Kb Minaay is a 51 y.o. male who is here today to establish care with Gastroenterology for possible cirrhosis.   He states that he was in the emergency room yesterday with the complaints of near syncope.  He had this episode at least 4 times over the 1 year time.  He has been having intermittent abdominal pain mostly RUQ, RLQ and occasionally lower abdomen over 5 years. Associated nausea occasional vomiting. intermittently and loose stool, watery diarrhea. He will have anywhere loose stool 1-3 times per day. This patient deny any hematochezia or melena.  Weight is stable. No odynophagia or dysphagia. There is history of acid reflux on ppi now with decent control on symptoms. There is no history of anemia.  Last EGD was on 2021 by Dr. Diaz, gastroenterology revealed mild gastritis and intestinal metaplasia.  Esophagus reported as normal without any esophageal varices. Last colonoscopy was year ago at [facility], advanced polyos removed as per pt. Polyps removed and advised to repeat colon in one year.      He had a  "laparoscopic cholecystectomy done on 8/8/2021 for a biliary dyskinesia and gallbladder sludge noted with ultrasound done in June 2021.  His pathology revealed chronic cholecystitis and focal cholesterolosis.  Laparoscopically liver was nodular suggesting cirrhosis.  Biopsies obtained revealed steatohepatitis grade 2 of the 3 and page 4 with a 4.  Patient is obese, hypertensive and diabetic indicating metabolic syndrome.  He had a last CT scan abdomen pelvis done in October 2020 which revealed signs of cirrhosis.  No family history of any liver cirrhosis.  No prior history of any chronic hepatitis.     No family history of colon cancer or any GI malignancy. Dad had colon Ca. No history of any abdominal surgery. Denies alcohol abuse or cigarette smoking.   He has a prior history of coronary artery disease and MI in 2020 and 2012.  He had a cardiac stent placed in 2006  He is on ASA/Plavix/ Pletal.     Subjective      Past Medical History:   Past Medical History:   Diagnosis Date   • Ankle fracture, left    • Anxiety    • Anxiety and depression    • Asthma    • Back pain    • Blood clot in vein 01/2021    right leg-states has resolved now   • Cirrhosis (HCC)    • CKD (chronic kidney disease)     pt denies 7/6/21   • Depression    • Diabetes mellitus (HCC)    • Fatty liver    • GERD (gastroesophageal reflux disease)    • H/O echocardiogram 02/2021   • History of nuclear stress test 01/2020    \"I did ok with it\"   • Hypertension    • Kidney stone    • Myocardial infarction (HCC)     MI IN 2006 (stent x 1), 2012 (no stents), 8/2/2020 (no stents with last MI).  Just saw Dr. Duenas recently for a f/u.  (assessed 11/22/21)   • Pilonidal cyst    • Plantar fasciitis     bilateral   • Seasonal allergies    • Sleep apnea     CPAP-\"I don't wear it every night\"   • Wears glasses    • Wrist fracture, left      Past Surgical History:   Past Surgical History:   Procedure Laterality Date   • APPENDECTOMY     • CARDIAC CATHETERIZATION  " 2006    stent x 1   • CARDIAC CATHETERIZATION  2012    no stents   • CHOLECYSTECTOMY  08/23/2021   • COLONOSCOPY     • COLONOSCOPY N/A 11/23/2021    Procedure: COLONOSCOPY with biopsies;  Surgeon: Yaneth Bell MD;  Location: [facility] ENDOSCOPY;  Service: Gastroenterology;  Laterality: N/A;   • ENDOSCOPY     • ENDOSCOPY N/A 7/7/2021    Procedure: ESOPHAGOGASTRODUODENOSCOPY WITH BOPSIES;  Surgeon: Arjun Delgadillo MD;  Location: [facility] ENDOSCOPY;  Service: Gastroenterology;  Laterality: N/A;   • PLANTAR FASCIA RELEASE      right   • SKIN BIOPSY      benign       Family History:   Family History   Problem Relation Age of Onset   • Diabetes Mother    • COPD Mother    • Heart failure Mother    • Heart disease Father    • Lung cancer Father        Social History:   Social History     Socioeconomic History   • Marital status:    Tobacco Use   • Smoking status: Never Smoker   • Smokeless tobacco: Never Used   Vaping Use   • Vaping Use: Never used   Substance and Sexual Activity   • Alcohol use: Not Currently     Comment: maybe 1 drink per year (rarely)   • Drug use: No   • Sexual activity: Defer       Medications:     Current Outpatient Medications:   •  aspirin 325 MG tablet, Take 325 mg by mouth Daily., Disp: , Rfl:   •  carvedilol (COREG) 6.25 MG tablet, Take 6.25 mg by mouth 2 (Two) Times a Day With Meals., Disp: , Rfl:   •  cilostazol (PLETAL) 50 MG tablet, TAKE 1 TABLET BY MOUTH TWICE DAILY 30 MINUTES BEFORE OR 2 HOURS AFTER BREAKFAST AND DINNER, Disp: , Rfl:   •  clopidogrel (PLAVIX) 75 MG tablet, Take 75 mg by mouth Daily., Disp: , Rfl:   •  dicyclomine (BENTYL) 20 MG tablet, Take 1 tablet by mouth 3 (Three) Times a Day As Needed (abdominal pain)., Disp: 60 tablet, Rfl: 1  •  glyburide (DIAbeta) 5 MG tablet, Take 10 mg by mouth 2 (Two) Times a Day With Meals., Disp: , Rfl:   •  hydrOXYzine (ATARAX) 10 MG tablet, Take 10 mg by mouth 2 (Two) Times a Day As Needed. for anxiety, Disp: , Rfl:   •   "lisinopril-hydrochlorothiazide (PRINZIDE,ZESTORETIC) 20-25 MG per tablet, Take 1 tablet by mouth Daily., Disp: , Rfl:   •  nitroglycerin (NITROSTAT) 0.4 MG SL tablet, Place 0.4 mg under the tongue., Disp: , Rfl:   •  Semaglutide,0.25 or 0.5MG/DOS, (Ozempic, 0.25 or 0.5 MG/DOSE,) 2 MG/1.5ML solution pen-injector, Inject  under the skin into the appropriate area as directed 1 (One) Time Per Week., Disp: , Rfl:   •  venlafaxine XR (EFFEXOR-XR) 150 MG 24 hr capsule, Take 150 mg by mouth Daily., Disp: , Rfl:   •  Ventolin  (90 Base) MCG/ACT inhaler, Inhale 1-2 puffs As Needed. every 4 to 6 hours, Disp: , Rfl:   •  vitamin E 400 UNIT capsule, Take 400 Units by mouth 2 (Two) Times a Day., Disp: , Rfl:     Allergies:   Allergies   Allergen Reactions   • Morphine And Related Anaphylaxis   • Simvastatin Other (See Comments)     Liver problems   • Penicillins Hives   • Pradaxa [Dabigatran Etexilate Mesylate] Hives       Review of Systems:   Review of Systems   Constitutional: Negative for appetite change, fatigue, fever and unexpected weight loss.   HENT: Negative for trouble swallowing.    Gastrointestinal: Positive for abdominal pain, diarrhea, nausea and vomiting. Negative for abdominal distention, anal bleeding, blood in stool, constipation, rectal pain, GERD and indigestion.        Coffee-ground emesis       The following portions of the patient's history were reviewed and updated as appropriate: allergies, current medications, past family history, past medical history, past social history, past surgical history and problem list.    Objective     Physical Exam:  Vital Signs:   Vitals:    03/02/22 1434   BP: 130/84   Temp: 97.8 °F (36.6 °C)   TempSrc: Infrared   Weight: 131 kg (288 lb)   Height: 185.4 cm (73\")       Physical Exam  Constitutional:       Appearance: He is obese.   HENT:      Head: Normocephalic and atraumatic.   Eyes:      Conjunctiva/sclera: Conjunctivae normal.   Abdominal:      General: Abdomen is " flat. There is no distension.      Palpations: There is no mass.      Tenderness: There is no abdominal tenderness (Mild tenderness in the right lower quadrant). There is no guarding or rebound.      Hernia: No hernia is present.   Musculoskeletal:      Cervical back: Normal range of motion and neck supple.   Neurological:      Mental Status: He is alert.         Results Review:   I reviewed the patient's new clinical results.    Lab on 03/01/2022   Component Date Value Ref Range Status   • Protime 03/01/2022 13.3  12.0 - 15.1 Seconds Final   • INR 03/01/2022 0.96  0.90 - 1.10 Final   • WBC 03/01/2022 6.12  3.40 - 10.80 10*3/mm3 Final   • RBC 03/01/2022 5.36  4.14 - 5.80 10*6/mm3 Final   • Hemoglobin 03/01/2022 17.0  13.0 - 17.7 g/dL Final   • Hematocrit 03/01/2022 50.1  37.5 - 51.0 % Final   • MCV 03/01/2022 93.5  79.0 - 97.0 fL Final   • MCH 03/01/2022 31.7  26.6 - 33.0 pg Final   • MCHC 03/01/2022 33.9  31.5 - 35.7 g/dL Final   • RDW 03/01/2022 12.9  12.3 - 15.4 % Final   • RDW-SD 03/01/2022 44.0  37.0 - 54.0 fl Final   • MPV 03/01/2022 11.4  6.0 - 12.0 fL Final   • Platelets 03/01/2022 166  140 - 450 10*3/mm3 Final   • Neutrophil % 03/01/2022 61.4  42.7 - 76.0 % Final   • Lymphocyte % 03/01/2022 30.9  19.6 - 45.3 % Final   • Monocyte % 03/01/2022 5.4  5.0 - 12.0 % Final   • Eosinophil % 03/01/2022 1.6  0.3 - 6.2 % Final   • Basophil % 03/01/2022 0.7  0.0 - 1.5 % Final   • Immature Grans % 03/01/2022 0.0  0.0 - 0.5 % Final   • Neutrophils, Absolute 03/01/2022 3.76  1.70 - 7.00 10*3/mm3 Final   • Lymphocytes, Absolute 03/01/2022 1.89  0.70 - 3.10 10*3/mm3 Final   • Monocytes, Absolute 03/01/2022 0.33  0.10 - 0.90 10*3/mm3 Final   • Eosinophils, Absolute 03/01/2022 0.10  0.00 - 0.40 10*3/mm3 Final   • Basophils, Absolute 03/01/2022 0.04  0.00 - 0.20 10*3/mm3 Final   • Immature Grans, Absolute 03/01/2022 0.00  0.00 - 0.05 10*3/mm3 Final   • nRBC 03/01/2022 0.0  0.0 - 0.2 /100 WBC Final   • Glucose 03/01/2022 190* 65  - 99 mg/dL Final   • BUN 03/01/2022 9  6 - 20 mg/dL Final   • Creatinine 03/01/2022 0.82  0.76 - 1.27 mg/dL Final   • Sodium 03/01/2022 139  136 - 145 mmol/L Final   • Potassium 03/01/2022 4.6  3.5 - 5.2 mmol/L Final    Slight hemolysis detected by analyzer. Results may be affected.   • Chloride 03/01/2022 106  98 - 107 mmol/L Final   • CO2 03/01/2022 23.7  22.0 - 29.0 mmol/L Final   • Calcium 03/01/2022 8.8  8.6 - 10.5 mg/dL Final   • Total Protein 03/01/2022 6.9  6.0 - 8.5 g/dL Final   • Albumin 03/01/2022 3.60  3.50 - 5.20 g/dL Final   • ALT (SGPT) 03/01/2022 116* 1 - 41 U/L Final   • AST (SGOT) 03/01/2022 74* 1 - 40 U/L Final   • Alkaline Phosphatase 03/01/2022 118* 39 - 117 U/L Final   • Total Bilirubin 03/01/2022 0.5  0.0 - 1.2 mg/dL Final   • Globulin 03/01/2022 3.3  gm/dL Final   • A/G Ratio 03/01/2022 1.1  g/dL Final   • BUN/Creatinine Ratio 03/01/2022 11.0  7.0 - 25.0 Final   • Anion Gap 03/01/2022 9.3  5.0 - 15.0 mmol/L Final   • eGFR 03/01/2022 104.4  >60.0 mL/min/1.73 Final    National Kidney Foundation and American Society of Nephrology (ASN) Task Force recommended calculation based on the Chronic Kidney Disease Epidemiology Collaboration (CKD-EPI) equation refit without adjustment for race.   Admission on 02/20/2022, Discharged on 02/20/2022   Component Date Value Ref Range Status   • Glucose 02/20/2022 219* 65 - 99 mg/dL Final    Glucose >180, Hemoglobin A1C recommended.   • BUN 02/20/2022 14  6 - 20 mg/dL Final   • Creatinine 02/20/2022 0.85  0.76 - 1.27 mg/dL Final   • Sodium 02/20/2022 136  136 - 145 mmol/L Final   • Potassium 02/20/2022 4.1  3.5 - 5.2 mmol/L Final    Slight hemolysis detected by analyzer. Results may be affected.   • Chloride 02/20/2022 102  98 - 107 mmol/L Final   • CO2 02/20/2022 22.6  22.0 - 29.0 mmol/L Final   • Calcium 02/20/2022 8.8  8.6 - 10.5 mg/dL Final   • Total Protein 02/20/2022 7.1  6.0 - 8.5 g/dL Final   • Albumin 02/20/2022 3.70  3.50 - 5.20 g/dL Final   • ALT  (SGPT) 02/20/2022 82* 1 - 41 U/L Final   • AST (SGOT) 02/20/2022 47* 1 - 40 U/L Final    Slight hemolysis detected by analyzer. Results may be affected.   • Alkaline Phosphatase 02/20/2022 124* 39 - 117 U/L Final   • Total Bilirubin 02/20/2022 0.9  0.0 - 1.2 mg/dL Final   • eGFR Non  Amer 02/20/2022 94  >60 mL/min/1.73 Final   • Globulin 02/20/2022 3.4  gm/dL Final   • A/G Ratio 02/20/2022 1.1  g/dL Final   • BUN/Creatinine Ratio 02/20/2022 16.5  7.0 - 25.0 Final   • Anion Gap 02/20/2022 11.4  5.0 - 15.0 mmol/L Final   • Lipase 02/20/2022 17  13 - 60 U/L Final   • Color, UA 02/20/2022 Dark Yellow* Yellow, Straw Final   • Appearance, UA 02/20/2022 Clear  Clear Final   • pH, UA 02/20/2022 5.5  5.0 - 8.0 Final   • Specific Gravity, UA 02/20/2022 >=1.030  1.005 - 1.030 Final   • Glucose, UA 02/20/2022 >=1000 mg/dL (3+)* Negative Final   • Ketones, UA 02/20/2022 Trace* Negative Final   • Bilirubin, UA 02/20/2022 Small (1+)* Negative Final   • Blood, UA 02/20/2022 Negative  Negative Final   • Protein, UA 02/20/2022 Trace* Negative Final   • Leuk Esterase, UA 02/20/2022 Negative  Negative Final   • Nitrite, UA 02/20/2022 Negative  Negative Final   • Urobilinogen, UA 02/20/2022 1.0 E.U./dL  0.2 - 1.0 E.U./dL Final   • Protime 02/20/2022 13.7  12.0 - 15.1 Seconds Final   • INR 02/20/2022 1.00  0.90 - 1.10 Final   • PTT 02/20/2022 29.1  24.5 - 37.2 seconds Final   • Troponin T 02/20/2022 <0.010  0.000 - 0.030 ng/mL Final   • WBC 02/20/2022 6.77  3.40 - 10.80 10*3/mm3 Final   • RBC 02/20/2022 5.46  4.14 - 5.80 10*6/mm3 Final   • Hemoglobin 02/20/2022 17.3  13.0 - 17.7 g/dL Final   • Hematocrit 02/20/2022 48.7  37.5 - 51.0 % Final   • MCV 02/20/2022 89.2  79.0 - 97.0 fL Final   • MCH 02/20/2022 31.7  26.6 - 33.0 pg Final   • MCHC 02/20/2022 35.5  31.5 - 35.7 g/dL Final   • RDW 02/20/2022 13.6  12.3 - 15.4 % Final   • RDW-SD 02/20/2022 44.0  37.0 - 54.0 fl Final   • MPV 02/20/2022 11.3  6.0 - 12.0 fL Final   • Platelets  02/20/2022 99* 140 - 450 10*3/mm3 Final   • Neutrophil % 02/20/2022 82.6* 42.7 - 76.0 % Final   • Lymphocyte % 02/20/2022 12.4* 19.6 - 45.3 % Final   • Monocyte % 02/20/2022 3.8* 5.0 - 12.0 % Final   • Eosinophil % 02/20/2022 0.4  0.3 - 6.2 % Final   • Basophil % 02/20/2022 0.4  0.0 - 1.5 % Final   • Immature Grans % 02/20/2022 0.4  0.0 - 0.5 % Final   • Neutrophils, Absolute 02/20/2022 5.58  1.70 - 7.00 10*3/mm3 Final   • Lymphocytes, Absolute 02/20/2022 0.84  0.70 - 3.10 10*3/mm3 Final   • Monocytes, Absolute 02/20/2022 0.26  0.10 - 0.90 10*3/mm3 Final   • Eosinophils, Absolute 02/20/2022 0.03  0.00 - 0.40 10*3/mm3 Final   • Basophils, Absolute 02/20/2022 0.03  0.00 - 0.20 10*3/mm3 Final   • Immature Grans, Absolute 02/20/2022 0.03  0.00 - 0.05 10*3/mm3 Final   • nRBC 02/20/2022 0.0  0.0 - 0.2 /100 WBC Final   Results Encounter on 01/14/2022   Component Date Value Ref Range Status   • ALPHA-FETOPROTEIN 03/01/2022 4.43  0 - 8.3 ng/mL Final      XR Shoulder 2+ View Right    Result Date: 2/21/2022  No acute fracture.  Degenerative changes at the AC joint.    Images were reviewed, interpreted, and dictated by Dr. Chi Patel M.D. Transcribed by Geetha Alejandra PA-C.  This report was signed and finalized on 2/21/2022 10:38 AM by Chi Patel M.D..    CT Head Without Contrast    Result Date: 2/20/2022  No acute intracranial hemorrhage or large acute cortical infarct. Authenticated by Johnathan Parker MD on 02/20/2022 10:12:20 PM    MRI Abdomen With & Without Contrast    Result Date: 3/1/2022  1. A 16 mm enhancing mass within the central right liver. Favor hemangioma over hepatocellular carcinoma. Recommend 6-month MR follow-up particularly since lesion is not evident on CT. 2. Findings of cirrhosis with portal hypertension.  This report was signed and finalized on 3/1/2022 3:29 PM by Elmo Valle MD.    US Abdomen Complete    Result Date: 1/10/2022  Findings consistent with cirrhosis with a 1.6 cm  nonspecific nodule in the medial right lobe of the liver. This should be followed up with an MR liver mass protocol for more complete evaluation  This report was finalized on 1/10/2022 1:39 PM by Chi Patel M.D..    CT Abdomen Pelvis With Contrast    Result Date: 2/20/2022  Cirrhosis and splenomegaly.  Portal hypertension and small esophageal varices. Authenticated by Johnathan Parker MD on 02/20/2022 10:53:37 PM    CT Chest Pulmonary Embolism    Result Date: 2/20/2022  No pulmonary embolus.  No acute findings in the chest. Authenticated by Johnathan Parker MD on 02/20/2022 10:23:04 PM      Assessment / Plan        1.  Nausea vomiting with coffee-ground emesis  2.  Change in bowel habit  Suspected IBS with diarrhea  3/2/2022  Patient does have some functional issues including possible irritable bowel causing him nausea, abdominal pain and change in bowel habit.   This could be a Bela-Liz tear versus peptic ulcer disease in the setting of aspirin Plavix use versus rare possibility for variceal bleeding although unlikely.  His lab work reviewed including the hemoglobin which was normal.    He had a CT abdomen pelvis done in the emergency room which revealed a cirrhosis and splenomegaly and also showed signs of portal hypertension with his esophageal varices.  He had a EGD done in July 2021 by Dr. Diaz and was reported as normal.  With patient having hematemesis and CT imaging showing possible esophageal varices we will schedule him for an urgent EGD for further evaluation with possible esophageal banding.  We will continue his Protonix low-dose  We will start him on colestipol 1 g p.o. twice daily.  Imodium half tablet 1 tablet p.o. as needed along with dicyclomine  Patient may need a nonselective beta-blockers based on CT and endoscopy findings  We will hold his aspirin for now until endoscopy is performed we will continue his Plavix given his high risk profile    The indications, technique, alternatives  and potential risk and complications were discussed with the patient including but not limited to bleeding, bowel perforations, missing lesions and anesthetic complications. The patient understands and wishes to proceed with the procedure and has given their verbal consent. Written patient education information was given to the patient.   The patient will call if they have further questions before procedure.     Prior history  3. Compensated cirrhosis of liver without ascites, unspecified hepatic cirrhosis type (CMS/HCC); MELD; 7 (8/2021)  4. NEW (nonalcoholic steatohepatitis) with compensated cirrhosis   5. Class 2 obesity due to excess calories with body mass index (BMI) of 39.0 to 39.9 in adult, unspecified whether serious comorbidity presen    12/1/2021  Unfortunately instead of losing weight patient gained about 5 pounds since the last visit.  He is not immune to hepatitis a and B hep C antibody is negative.  BRENDA, anti-smooth muscle antibody, antimitochondrial antibody negative.  Iron studies studies reveal a normal iron saturation ruling out hemochromatosis.  Alpha-1 antitrypsin levels were normal.  His New FibroSure test done revealed F1-F2 fibrosis, N1 steatohepatitis, S3 steatosis which is not consistent with the radiologic findings and the liver biopsy findings.  However patient lab studies, CT scan findings and liver biopsy are more in favor of cirrhosis  Advised to take a combined hep a and B vaccine at PCPs office or health center.  Counseled on avoid gaining any further weight.  We will follow-up with repeat labs in 6 months time.  Ultrasound abdomen for HCC surveillance in January 2022 and EGD in 2023 for esophageal variceal surveillance.  Continue vitamin E for now     8/30/2021  Patient history, prior work-up, clinical finding is not more in favor of compensated liver cirrhosis secondary to NEW without ascites  He had a elevated liver enzymes over 8 years now.  Last blood work on record was in  2014 with elevated liver enzymes and thrombocytopenia.  His recent EGD done in July 2021 by Dr. Diaz did not reveal any esophageal varices.  Ultrasound abdomen in July 2021 did not reveal any liver lesions.  Liver biopsy done during the laparoscopic cholecystectomy revealed a grade 2 through 3 steatohepatitis with the stage 4 fibrosis suggesting cirrhosis.  He is nonalcoholic and non-smoker.     6. Gastroesophageal reflux disease without esophagitis  12/1/2021  His Protonix dose was reduced to 20 mg p.o. daily and he is not having any significant reflux symptoms now, will make this as demand in the next 3 months time     7. Personal history of colonic polyps  Colonoscopy done on 11/23/2021 did not reveal any colon polyps.  No endoscopic signs of any colitis colonic biopsies and terminal ileum biopsies were normal. Due to his advanced polyp removed in the past will repeat colonoscopy in 5 years time in 2026   As per patient he had a colonoscopy done a year ago at [facility].  He had 5 polyps removed one of them was large and was advised to repeat colonoscopy in 1 year time.         Follow Up:   No follow-ups on file.    Yaneth Bell MD  [facility] [place]  3/2/2022  14:36 EST     Please note that portions of this note may have been completed with a voice recognition program.

## 2022-03-02 NOTE — H&P (VIEW-ONLY)
Follow Up Note     Date: 2022   Patient Name: Kb Minaya  MRN: [id number]  : 1970     Referring Physician: Vilma Cassidy MD    Chief Complaint:    Chief Complaint   Patient presents with   • Follow-up   • Abdominal Pain       Interval History:   3/2/2022  Kb Minaya is a 52 y.o. male who is here today for follow up after his recent visit to the emergency room.  He was in the emergency room a week ago with complaints of nausea vomiting and right side abdominal pain and coffee-ground emesis.  He states that nausea improved however he still has right-sided abdominal pain..  Denies any blood in the stool.      2021  Kb Minaya is a 51 y.o. male who is here today for follow up for follow-up of his cirrhosis.  He states that his abdominal pain and diarrhea has resolved now.  He is here for follow-up after his recent colonoscopy.     2021  Kb Minaya is a 51 y.o. male who is here today to establish care with Gastroenterology for possible cirrhosis.   He states that he was in the emergency room yesterday with the complaints of near syncope.  He had this episode at least 4 times over the 1 year time.  He has been having intermittent abdominal pain mostly RUQ, RLQ and occasionally lower abdomen over 5 years. Associated nausea occasional vomiting. intermittently and loose stool, watery diarrhea. He will have anywhere loose stool 1-3 times per day. This patient deny any hematochezia or melena.  Weight is stable. No odynophagia or dysphagia. There is history of acid reflux on ppi now with decent control on symptoms. There is no history of anemia.  Last EGD was on 2021 by Dr. Diaz, gastroenterology revealed mild gastritis and intestinal metaplasia.  Esophagus reported as normal without any esophageal varices. Last colonoscopy was year ago at [facility], advanced polyos removed as per pt. Polyps removed and advised to repeat colon in one year.      He had a  "laparoscopic cholecystectomy done on 8/8/2021 for a biliary dyskinesia and gallbladder sludge noted with ultrasound done in June 2021.  His pathology revealed chronic cholecystitis and focal cholesterolosis.  Laparoscopically liver was nodular suggesting cirrhosis.  Biopsies obtained revealed steatohepatitis grade 2 of the 3 and page 4 with a 4.  Patient is obese, hypertensive and diabetic indicating metabolic syndrome.  He had a last CT scan abdomen pelvis done in October 2020 which revealed signs of cirrhosis.  No family history of any liver cirrhosis.  No prior history of any chronic hepatitis.     No family history of colon cancer or any GI malignancy. Dad had colon Ca. No history of any abdominal surgery. Denies alcohol abuse or cigarette smoking.   He has a prior history of coronary artery disease and MI in 2020 and 2012.  He had a cardiac stent placed in 2006  He is on ASA/Plavix/ Pletal.     Subjective      Past Medical History:   Past Medical History:   Diagnosis Date   • Ankle fracture, left    • Anxiety    • Anxiety and depression    • Asthma    • Back pain    • Blood clot in vein 01/2021    right leg-states has resolved now   • Cirrhosis (HCC)    • CKD (chronic kidney disease)     pt denies 7/6/21   • Depression    • Diabetes mellitus (HCC)    • Fatty liver    • GERD (gastroesophageal reflux disease)    • H/O echocardiogram 02/2021   • History of nuclear stress test 01/2020    \"I did ok with it\"   • Hypertension    • Kidney stone    • Myocardial infarction (HCC)     MI IN 2006 (stent x 1), 2012 (no stents), 8/2/2020 (no stents with last MI).  Just saw Dr. Duenas recently for a f/u.  (assessed 11/22/21)   • Pilonidal cyst    • Plantar fasciitis     bilateral   • Seasonal allergies    • Sleep apnea     CPAP-\"I don't wear it every night\"   • Wears glasses    • Wrist fracture, left      Past Surgical History:   Past Surgical History:   Procedure Laterality Date   • APPENDECTOMY     • CARDIAC CATHETERIZATION  " 2006    stent x 1   • CARDIAC CATHETERIZATION  2012    no stents   • CHOLECYSTECTOMY  08/23/2021   • COLONOSCOPY     • COLONOSCOPY N/A 11/23/2021    Procedure: COLONOSCOPY with biopsies;  Surgeon: Yaneth Bell MD;  Location: [facility] ENDOSCOPY;  Service: Gastroenterology;  Laterality: N/A;   • ENDOSCOPY     • ENDOSCOPY N/A 7/7/2021    Procedure: ESOPHAGOGASTRODUODENOSCOPY WITH BOPSIES;  Surgeon: Arjun Delgadillo MD;  Location: [facility] ENDOSCOPY;  Service: Gastroenterology;  Laterality: N/A;   • PLANTAR FASCIA RELEASE      right   • SKIN BIOPSY      benign       Family History:   Family History   Problem Relation Age of Onset   • Diabetes Mother    • COPD Mother    • Heart failure Mother    • Heart disease Father    • Lung cancer Father        Social History:   Social History     Socioeconomic History   • Marital status:    Tobacco Use   • Smoking status: Never Smoker   • Smokeless tobacco: Never Used   Vaping Use   • Vaping Use: Never used   Substance and Sexual Activity   • Alcohol use: Not Currently     Comment: maybe 1 drink per year (rarely)   • Drug use: No   • Sexual activity: Defer       Medications:     Current Outpatient Medications:   •  aspirin 325 MG tablet, Take 325 mg by mouth Daily., Disp: , Rfl:   •  carvedilol (COREG) 6.25 MG tablet, Take 6.25 mg by mouth 2 (Two) Times a Day With Meals., Disp: , Rfl:   •  cilostazol (PLETAL) 50 MG tablet, TAKE 1 TABLET BY MOUTH TWICE DAILY 30 MINUTES BEFORE OR 2 HOURS AFTER BREAKFAST AND DINNER, Disp: , Rfl:   •  clopidogrel (PLAVIX) 75 MG tablet, Take 75 mg by mouth Daily., Disp: , Rfl:   •  dicyclomine (BENTYL) 20 MG tablet, Take 1 tablet by mouth 3 (Three) Times a Day As Needed (abdominal pain)., Disp: 60 tablet, Rfl: 1  •  glyburide (DIAbeta) 5 MG tablet, Take 10 mg by mouth 2 (Two) Times a Day With Meals., Disp: , Rfl:   •  hydrOXYzine (ATARAX) 10 MG tablet, Take 10 mg by mouth 2 (Two) Times a Day As Needed. for anxiety, Disp: , Rfl:   •   "lisinopril-hydrochlorothiazide (PRINZIDE,ZESTORETIC) 20-25 MG per tablet, Take 1 tablet by mouth Daily., Disp: , Rfl:   •  nitroglycerin (NITROSTAT) 0.4 MG SL tablet, Place 0.4 mg under the tongue., Disp: , Rfl:   •  Semaglutide,0.25 or 0.5MG/DOS, (Ozempic, 0.25 or 0.5 MG/DOSE,) 2 MG/1.5ML solution pen-injector, Inject  under the skin into the appropriate area as directed 1 (One) Time Per Week., Disp: , Rfl:   •  venlafaxine XR (EFFEXOR-XR) 150 MG 24 hr capsule, Take 150 mg by mouth Daily., Disp: , Rfl:   •  Ventolin  (90 Base) MCG/ACT inhaler, Inhale 1-2 puffs As Needed. every 4 to 6 hours, Disp: , Rfl:   •  vitamin E 400 UNIT capsule, Take 400 Units by mouth 2 (Two) Times a Day., Disp: , Rfl:     Allergies:   Allergies   Allergen Reactions   • Morphine And Related Anaphylaxis   • Simvastatin Other (See Comments)     Liver problems   • Penicillins Hives   • Pradaxa [Dabigatran Etexilate Mesylate] Hives       Review of Systems:   Review of Systems   Constitutional: Negative for appetite change, fatigue, fever and unexpected weight loss.   HENT: Negative for trouble swallowing.    Gastrointestinal: Positive for abdominal pain, diarrhea, nausea and vomiting. Negative for abdominal distention, anal bleeding, blood in stool, constipation, rectal pain, GERD and indigestion.        Coffee-ground emesis       The following portions of the patient's history were reviewed and updated as appropriate: allergies, current medications, past family history, past medical history, past social history, past surgical history and problem list.    Objective     Physical Exam:  Vital Signs:   Vitals:    03/02/22 1434   BP: 130/84   Temp: 97.8 °F (36.6 °C)   TempSrc: Infrared   Weight: 131 kg (288 lb)   Height: 185.4 cm (73\")       Physical Exam  Constitutional:       Appearance: He is obese.   HENT:      Head: Normocephalic and atraumatic.   Eyes:      Conjunctiva/sclera: Conjunctivae normal.   Abdominal:      General: Abdomen is " flat. There is no distension.      Palpations: There is no mass.      Tenderness: There is no abdominal tenderness (Mild tenderness in the right lower quadrant). There is no guarding or rebound.      Hernia: No hernia is present.   Musculoskeletal:      Cervical back: Normal range of motion and neck supple.   Neurological:      Mental Status: He is alert.         Results Review:   I reviewed the patient's new clinical results.    Lab on 03/01/2022   Component Date Value Ref Range Status   • Protime 03/01/2022 13.3  12.0 - 15.1 Seconds Final   • INR 03/01/2022 0.96  0.90 - 1.10 Final   • WBC 03/01/2022 6.12  3.40 - 10.80 10*3/mm3 Final   • RBC 03/01/2022 5.36  4.14 - 5.80 10*6/mm3 Final   • Hemoglobin 03/01/2022 17.0  13.0 - 17.7 g/dL Final   • Hematocrit 03/01/2022 50.1  37.5 - 51.0 % Final   • MCV 03/01/2022 93.5  79.0 - 97.0 fL Final   • MCH 03/01/2022 31.7  26.6 - 33.0 pg Final   • MCHC 03/01/2022 33.9  31.5 - 35.7 g/dL Final   • RDW 03/01/2022 12.9  12.3 - 15.4 % Final   • RDW-SD 03/01/2022 44.0  37.0 - 54.0 fl Final   • MPV 03/01/2022 11.4  6.0 - 12.0 fL Final   • Platelets 03/01/2022 166  140 - 450 10*3/mm3 Final   • Neutrophil % 03/01/2022 61.4  42.7 - 76.0 % Final   • Lymphocyte % 03/01/2022 30.9  19.6 - 45.3 % Final   • Monocyte % 03/01/2022 5.4  5.0 - 12.0 % Final   • Eosinophil % 03/01/2022 1.6  0.3 - 6.2 % Final   • Basophil % 03/01/2022 0.7  0.0 - 1.5 % Final   • Immature Grans % 03/01/2022 0.0  0.0 - 0.5 % Final   • Neutrophils, Absolute 03/01/2022 3.76  1.70 - 7.00 10*3/mm3 Final   • Lymphocytes, Absolute 03/01/2022 1.89  0.70 - 3.10 10*3/mm3 Final   • Monocytes, Absolute 03/01/2022 0.33  0.10 - 0.90 10*3/mm3 Final   • Eosinophils, Absolute 03/01/2022 0.10  0.00 - 0.40 10*3/mm3 Final   • Basophils, Absolute 03/01/2022 0.04  0.00 - 0.20 10*3/mm3 Final   • Immature Grans, Absolute 03/01/2022 0.00  0.00 - 0.05 10*3/mm3 Final   • nRBC 03/01/2022 0.0  0.0 - 0.2 /100 WBC Final   • Glucose 03/01/2022 190* 65  - 99 mg/dL Final   • BUN 03/01/2022 9  6 - 20 mg/dL Final   • Creatinine 03/01/2022 0.82  0.76 - 1.27 mg/dL Final   • Sodium 03/01/2022 139  136 - 145 mmol/L Final   • Potassium 03/01/2022 4.6  3.5 - 5.2 mmol/L Final    Slight hemolysis detected by analyzer. Results may be affected.   • Chloride 03/01/2022 106  98 - 107 mmol/L Final   • CO2 03/01/2022 23.7  22.0 - 29.0 mmol/L Final   • Calcium 03/01/2022 8.8  8.6 - 10.5 mg/dL Final   • Total Protein 03/01/2022 6.9  6.0 - 8.5 g/dL Final   • Albumin 03/01/2022 3.60  3.50 - 5.20 g/dL Final   • ALT (SGPT) 03/01/2022 116* 1 - 41 U/L Final   • AST (SGOT) 03/01/2022 74* 1 - 40 U/L Final   • Alkaline Phosphatase 03/01/2022 118* 39 - 117 U/L Final   • Total Bilirubin 03/01/2022 0.5  0.0 - 1.2 mg/dL Final   • Globulin 03/01/2022 3.3  gm/dL Final   • A/G Ratio 03/01/2022 1.1  g/dL Final   • BUN/Creatinine Ratio 03/01/2022 11.0  7.0 - 25.0 Final   • Anion Gap 03/01/2022 9.3  5.0 - 15.0 mmol/L Final   • eGFR 03/01/2022 104.4  >60.0 mL/min/1.73 Final    National Kidney Foundation and American Society of Nephrology (ASN) Task Force recommended calculation based on the Chronic Kidney Disease Epidemiology Collaboration (CKD-EPI) equation refit without adjustment for race.   Admission on 02/20/2022, Discharged on 02/20/2022   Component Date Value Ref Range Status   • Glucose 02/20/2022 219* 65 - 99 mg/dL Final    Glucose >180, Hemoglobin A1C recommended.   • BUN 02/20/2022 14  6 - 20 mg/dL Final   • Creatinine 02/20/2022 0.85  0.76 - 1.27 mg/dL Final   • Sodium 02/20/2022 136  136 - 145 mmol/L Final   • Potassium 02/20/2022 4.1  3.5 - 5.2 mmol/L Final    Slight hemolysis detected by analyzer. Results may be affected.   • Chloride 02/20/2022 102  98 - 107 mmol/L Final   • CO2 02/20/2022 22.6  22.0 - 29.0 mmol/L Final   • Calcium 02/20/2022 8.8  8.6 - 10.5 mg/dL Final   • Total Protein 02/20/2022 7.1  6.0 - 8.5 g/dL Final   • Albumin 02/20/2022 3.70  3.50 - 5.20 g/dL Final   • ALT  (SGPT) 02/20/2022 82* 1 - 41 U/L Final   • AST (SGOT) 02/20/2022 47* 1 - 40 U/L Final    Slight hemolysis detected by analyzer. Results may be affected.   • Alkaline Phosphatase 02/20/2022 124* 39 - 117 U/L Final   • Total Bilirubin 02/20/2022 0.9  0.0 - 1.2 mg/dL Final   • eGFR Non  Amer 02/20/2022 94  >60 mL/min/1.73 Final   • Globulin 02/20/2022 3.4  gm/dL Final   • A/G Ratio 02/20/2022 1.1  g/dL Final   • BUN/Creatinine Ratio 02/20/2022 16.5  7.0 - 25.0 Final   • Anion Gap 02/20/2022 11.4  5.0 - 15.0 mmol/L Final   • Lipase 02/20/2022 17  13 - 60 U/L Final   • Color, UA 02/20/2022 Dark Yellow* Yellow, Straw Final   • Appearance, UA 02/20/2022 Clear  Clear Final   • pH, UA 02/20/2022 5.5  5.0 - 8.0 Final   • Specific Gravity, UA 02/20/2022 >=1.030  1.005 - 1.030 Final   • Glucose, UA 02/20/2022 >=1000 mg/dL (3+)* Negative Final   • Ketones, UA 02/20/2022 Trace* Negative Final   • Bilirubin, UA 02/20/2022 Small (1+)* Negative Final   • Blood, UA 02/20/2022 Negative  Negative Final   • Protein, UA 02/20/2022 Trace* Negative Final   • Leuk Esterase, UA 02/20/2022 Negative  Negative Final   • Nitrite, UA 02/20/2022 Negative  Negative Final   • Urobilinogen, UA 02/20/2022 1.0 E.U./dL  0.2 - 1.0 E.U./dL Final   • Protime 02/20/2022 13.7  12.0 - 15.1 Seconds Final   • INR 02/20/2022 1.00  0.90 - 1.10 Final   • PTT 02/20/2022 29.1  24.5 - 37.2 seconds Final   • Troponin T 02/20/2022 <0.010  0.000 - 0.030 ng/mL Final   • WBC 02/20/2022 6.77  3.40 - 10.80 10*3/mm3 Final   • RBC 02/20/2022 5.46  4.14 - 5.80 10*6/mm3 Final   • Hemoglobin 02/20/2022 17.3  13.0 - 17.7 g/dL Final   • Hematocrit 02/20/2022 48.7  37.5 - 51.0 % Final   • MCV 02/20/2022 89.2  79.0 - 97.0 fL Final   • MCH 02/20/2022 31.7  26.6 - 33.0 pg Final   • MCHC 02/20/2022 35.5  31.5 - 35.7 g/dL Final   • RDW 02/20/2022 13.6  12.3 - 15.4 % Final   • RDW-SD 02/20/2022 44.0  37.0 - 54.0 fl Final   • MPV 02/20/2022 11.3  6.0 - 12.0 fL Final   • Platelets  02/20/2022 99* 140 - 450 10*3/mm3 Final   • Neutrophil % 02/20/2022 82.6* 42.7 - 76.0 % Final   • Lymphocyte % 02/20/2022 12.4* 19.6 - 45.3 % Final   • Monocyte % 02/20/2022 3.8* 5.0 - 12.0 % Final   • Eosinophil % 02/20/2022 0.4  0.3 - 6.2 % Final   • Basophil % 02/20/2022 0.4  0.0 - 1.5 % Final   • Immature Grans % 02/20/2022 0.4  0.0 - 0.5 % Final   • Neutrophils, Absolute 02/20/2022 5.58  1.70 - 7.00 10*3/mm3 Final   • Lymphocytes, Absolute 02/20/2022 0.84  0.70 - 3.10 10*3/mm3 Final   • Monocytes, Absolute 02/20/2022 0.26  0.10 - 0.90 10*3/mm3 Final   • Eosinophils, Absolute 02/20/2022 0.03  0.00 - 0.40 10*3/mm3 Final   • Basophils, Absolute 02/20/2022 0.03  0.00 - 0.20 10*3/mm3 Final   • Immature Grans, Absolute 02/20/2022 0.03  0.00 - 0.05 10*3/mm3 Final   • nRBC 02/20/2022 0.0  0.0 - 0.2 /100 WBC Final   Results Encounter on 01/14/2022   Component Date Value Ref Range Status   • ALPHA-FETOPROTEIN 03/01/2022 4.43  0 - 8.3 ng/mL Final      XR Shoulder 2+ View Right    Result Date: 2/21/2022  No acute fracture.  Degenerative changes at the AC joint.    Images were reviewed, interpreted, and dictated by Dr. Chi Patel M.D. Transcribed by Geetha Alejandra PA-C.  This report was signed and finalized on 2/21/2022 10:38 AM by Chi Patel M.D..    CT Head Without Contrast    Result Date: 2/20/2022  No acute intracranial hemorrhage or large acute cortical infarct. Authenticated by Johnathan Parker MD on 02/20/2022 10:12:20 PM    MRI Abdomen With & Without Contrast    Result Date: 3/1/2022  1. A 16 mm enhancing mass within the central right liver. Favor hemangioma over hepatocellular carcinoma. Recommend 6-month MR follow-up particularly since lesion is not evident on CT. 2. Findings of cirrhosis with portal hypertension.  This report was signed and finalized on 3/1/2022 3:29 PM by Elmo Valle MD.    US Abdomen Complete    Result Date: 1/10/2022  Findings consistent with cirrhosis with a 1.6 cm  nonspecific nodule in the medial right lobe of the liver. This should be followed up with an MR liver mass protocol for more complete evaluation  This report was finalized on 1/10/2022 1:39 PM by Chi Patel M.D..    CT Abdomen Pelvis With Contrast    Result Date: 2/20/2022  Cirrhosis and splenomegaly.  Portal hypertension and small esophageal varices. Authenticated by Johnathan Parker MD on 02/20/2022 10:53:37 PM    CT Chest Pulmonary Embolism    Result Date: 2/20/2022  No pulmonary embolus.  No acute findings in the chest. Authenticated by Johnathan Parker MD on 02/20/2022 10:23:04 PM      Assessment / Plan        1.  Nausea vomiting with coffee-ground emesis  2.  Change in bowel habit  Suspected IBS with diarrhea  3/2/2022  Patient does have some functional issues including possible irritable bowel causing him nausea, abdominal pain and change in bowel habit.   This could be a Bela-Liz tear versus peptic ulcer disease in the setting of aspirin Plavix use versus rare possibility for variceal bleeding although unlikely.  His lab work reviewed including the hemoglobin which was normal.    He had a CT abdomen pelvis done in the emergency room which revealed a cirrhosis and splenomegaly and also showed signs of portal hypertension with his esophageal varices.  He had a EGD done in July 2021 by Dr. Diaz and was reported as normal.  With patient having hematemesis and CT imaging showing possible esophageal varices we will schedule him for an urgent EGD for further evaluation with possible esophageal banding.  We will continue his Protonix low-dose  We will start him on colestipol 1 g p.o. twice daily.  Imodium half tablet 1 tablet p.o. as needed along with dicyclomine  Patient may need a nonselective beta-blockers based on CT and endoscopy findings  We will hold his aspirin for now until endoscopy is performed we will continue his Plavix given his high risk profile    The indications, technique, alternatives  and potential risk and complications were discussed with the patient including but not limited to bleeding, bowel perforations, missing lesions and anesthetic complications. The patient understands and wishes to proceed with the procedure and has given their verbal consent. Written patient education information was given to the patient.   The patient will call if they have further questions before procedure.     Prior history  3. Compensated cirrhosis of liver without ascites, unspecified hepatic cirrhosis type (CMS/HCC); MELD; 7 (8/2021)  4. NEW (nonalcoholic steatohepatitis) with compensated cirrhosis   5. Class 2 obesity due to excess calories with body mass index (BMI) of 39.0 to 39.9 in adult, unspecified whether serious comorbidity presen    12/1/2021  Unfortunately instead of losing weight patient gained about 5 pounds since the last visit.  He is not immune to hepatitis a and B hep C antibody is negative.  BRENDA, anti-smooth muscle antibody, antimitochondrial antibody negative.  Iron studies studies reveal a normal iron saturation ruling out hemochromatosis.  Alpha-1 antitrypsin levels were normal.  His New FibroSure test done revealed F1-F2 fibrosis, N1 steatohepatitis, S3 steatosis which is not consistent with the radiologic findings and the liver biopsy findings.  However patient lab studies, CT scan findings and liver biopsy are more in favor of cirrhosis  Advised to take a combined hep a and B vaccine at PCPs office or health center.  Counseled on avoid gaining any further weight.  We will follow-up with repeat labs in 6 months time.  Ultrasound abdomen for HCC surveillance in January 2022 and EGD in 2023 for esophageal variceal surveillance.  Continue vitamin E for now     8/30/2021  Patient history, prior work-up, clinical finding is not more in favor of compensated liver cirrhosis secondary to ENW without ascites  He had a elevated liver enzymes over 8 years now.  Last blood work on record was in  2014 with elevated liver enzymes and thrombocytopenia.  His recent EGD done in July 2021 by Dr. Diaz did not reveal any esophageal varices.  Ultrasound abdomen in July 2021 did not reveal any liver lesions.  Liver biopsy done during the laparoscopic cholecystectomy revealed a grade 2 through 3 steatohepatitis with the stage 4 fibrosis suggesting cirrhosis.  He is nonalcoholic and non-smoker.     6. Gastroesophageal reflux disease without esophagitis  12/1/2021  His Protonix dose was reduced to 20 mg p.o. daily and he is not having any significant reflux symptoms now, will make this as demand in the next 3 months time     7. Personal history of colonic polyps  Colonoscopy done on 11/23/2021 did not reveal any colon polyps.  No endoscopic signs of any colitis colonic biopsies and terminal ileum biopsies were normal. Due to his advanced polyp removed in the past will repeat colonoscopy in 5 years time in 2026   As per patient he had a colonoscopy done a year ago at [facility].  He had 5 polyps removed one of them was large and was advised to repeat colonoscopy in 1 year time.         Follow Up:   No follow-ups on file.    Yaneth Bell MD  [facility] [place]  3/2/2022  14:36 EST     Please note that portions of this note may have been completed with a voice recognition program.

## 2022-03-10 PROBLEM — K92.0 HEMATEMESIS WITH NAUSEA: Status: ACTIVE | Noted: 2022-03-10

## 2022-03-10 PROBLEM — R10.11 RIGHT UPPER QUADRANT ABDOMINAL PAIN: Status: ACTIVE | Noted: 2022-03-10

## 2022-03-15 ENCOUNTER — LAB (OUTPATIENT)
Dept: LAB | Facility: HOSPITAL | Age: 52
End: 2022-03-15

## 2022-03-15 DIAGNOSIS — Z01.818 PRE-OP TESTING: Primary | ICD-10-CM

## 2022-03-15 LAB — SARS-COV-2 RNA PNL SPEC NAA+PROBE: NOT DETECTED

## 2022-03-15 PROCEDURE — C9803 HOPD COVID-19 SPEC COLLECT: HCPCS

## 2022-03-15 PROCEDURE — U0004 COV-19 TEST NON-CDC HGH THRU: HCPCS

## 2022-03-16 NOTE — PRE-PROCEDURE INSTRUCTIONS
PAT phone history completed with pt for upcoming procedure on 3/17/22 with Dr. Bell.     PAT PASS GIVEN/REVIEWED WITH PT.  VERBALIZED UNDERSTANDING OF THE FOLLOWING:  DO NOT EAT, DRINK, SMOKE, USE SMOKELESS TOBACCO OR CHEW GUM AFTER MIDNIGHT THE NIGHT BEFORE SURGERY.  THIS ALSO INCLUDES HARD CANDIES AND MINTS.    DO NOT SHAVE THE AREA TO BE OPERATED ON AT LEAST 48 HOURS PRIOR TO THE PROCEDURE.  DO NOT WEAR MAKE UP OR NAIL POLISH.  DO NOT LEAVE IN ANY PIERCING OR WEAR JEWELRY THE DAY OF SURGERY.      DO NOT USE ADHESIVES IF YOU WEAR DENTURES.    DO NOT WEAR EYE CONTACTS; BRING IN YOUR GLASSES.    ONLY TAKE MEDICATION THE MORNING OF YOUR PROCEDURE IF INSTRUCTED BY YOUR SURGEON WITH ENOUGH WATER TO SWALLOW THE MEDICATION.  IF YOUR SURGEON DID NOT SPECIFY WHICH MEDICATIONS TO TAKE, YOU WILL NEED TO CALL THEIR OFFICE FOR FURTHER INSTRUCTIONS AND DO AS THEY INSTRUCT.    LEAVE ANYTHING YOU CONSIDER VALUABLE AT HOME.    YOU WILL NEED TO ARRANGE FOR SOMEONE TO DRIVE YOU HOME AFTER SURGERY.  IT IS RECOMMENDED THAT YOU DO NOT DRIVE, WORK, DRINK ALCOHOL OR MAKE MAJOR DECISIONS FOR AT LEAST 24 HOURS AFTER YOUR PROCEDURE IS COMPLETE.      THE DAY OF YOUR PROCEDURE, BRING IN THE FOLLOWING IF APPLICABLE:   PICTURE ID AND INSURANCE/MEDICARE OR MEDICAID CARDS/ANY CO-PAY THAT MAY BE DUE   COPY OF ADVANCED DIRECTIVE/LIVING WILL/POWER OR    CPAP/BIPAP/INHALERS   SKIN PREP SHEET   YOUR PREADMISSION TESTING PASS (IF NOT A PHONE HISTORY)           COVID self-quarantine instructions reviewed with the pt.  Verbalized understanding.

## 2022-03-17 ENCOUNTER — ANESTHESIA EVENT (OUTPATIENT)
Dept: GASTROENTEROLOGY | Facility: HOSPITAL | Age: 52
End: 2022-03-17

## 2022-03-17 ENCOUNTER — HOSPITAL ENCOUNTER (OUTPATIENT)
Facility: HOSPITAL | Age: 52
Setting detail: HOSPITAL OUTPATIENT SURGERY
Discharge: HOME | End: 2022-03-17
Attending: INTERNAL MEDICINE | Admitting: INTERNAL MEDICINE

## 2022-03-17 ENCOUNTER — ANESTHESIA (OUTPATIENT)
Dept: GASTROENTEROLOGY | Facility: HOSPITAL | Age: 52
End: 2022-03-17

## 2022-03-17 VITALS
OXYGEN SATURATION: 98 % | HEIGHT: 73 IN | BODY MASS INDEX: 37.37 KG/M2 | SYSTOLIC BLOOD PRESSURE: 148 MMHG | DIASTOLIC BLOOD PRESSURE: 101 MMHG | WEIGHT: 282 LBS | HEART RATE: 73 BPM | RESPIRATION RATE: 18 BRPM | TEMPERATURE: 99 F

## 2022-03-17 DIAGNOSIS — R10.11 RIGHT UPPER QUADRANT ABDOMINAL PAIN: ICD-10-CM

## 2022-03-17 DIAGNOSIS — K92.0 HEMATEMESIS WITH NAUSEA: ICD-10-CM

## 2022-03-17 LAB — GLUCOSE BLDC GLUCOMTR-MCNC: 188 MG/DL (ref 70–130)

## 2022-03-17 PROCEDURE — 43239 EGD BIOPSY SINGLE/MULTIPLE: CPT | Performed by: INTERNAL MEDICINE

## 2022-03-17 PROCEDURE — 82962 GLUCOSE BLOOD TEST: CPT

## 2022-03-17 PROCEDURE — 25010000002 PROPOFOL 10 MG/ML EMULSION: Performed by: NURSE ANESTHETIST, CERTIFIED REGISTERED

## 2022-03-17 RX ORDER — LIDOCAINE HYDROCHLORIDE 20 MG/ML
INJECTION, SOLUTION INTRAVENOUS AS NEEDED
Status: DISCONTINUED | OUTPATIENT
Start: 2022-03-17 | End: 2022-03-17 | Stop reason: SURG

## 2022-03-17 RX ORDER — PANTOPRAZOLE SODIUM 20 MG/1
20 TABLET, DELAYED RELEASE ORAL 2 TIMES DAILY
Qty: 60 TABLET | Refills: 5 | Status: SHIPPED | OUTPATIENT
Start: 2022-03-17 | End: 2022-09-26

## 2022-03-17 RX ORDER — PROPOFOL 10 MG/ML
VIAL (ML) INTRAVENOUS AS NEEDED
Status: DISCONTINUED | OUTPATIENT
Start: 2022-03-17 | End: 2022-03-17 | Stop reason: SURG

## 2022-03-17 RX ORDER — SODIUM CHLORIDE 0.9 % (FLUSH) 0.9 %
10 SYRINGE (ML) INJECTION AS NEEDED
Status: DISCONTINUED | OUTPATIENT
Start: 2022-03-17 | End: 2022-03-17 | Stop reason: HOSPADM

## 2022-03-17 RX ORDER — SODIUM CHLORIDE 9 MG/ML
70 INJECTION, SOLUTION INTRAVENOUS CONTINUOUS PRN
Status: DISCONTINUED | OUTPATIENT
Start: 2022-03-17 | End: 2022-03-17 | Stop reason: HOSPADM

## 2022-03-17 RX ORDER — KETAMINE HYDROCHLORIDE 50 MG/ML
INJECTION, SOLUTION, CONCENTRATE INTRAMUSCULAR; INTRAVENOUS AS NEEDED
Status: DISCONTINUED | OUTPATIENT
Start: 2022-03-17 | End: 2022-03-17 | Stop reason: SURG

## 2022-03-17 RX ADMIN — PROPOFOL 150 MG: 10 INJECTION, EMULSION INTRAVENOUS at 15:51

## 2022-03-17 RX ADMIN — KETAMINE HYDROCHLORIDE 25 MG: 50 INJECTION, SOLUTION INTRAMUSCULAR; INTRAVENOUS at 15:51

## 2022-03-17 RX ADMIN — SODIUM CHLORIDE 70 ML/HR: 9 INJECTION, SOLUTION INTRAVENOUS at 13:56

## 2022-03-17 RX ADMIN — LIDOCAINE HYDROCHLORIDE 40 MG: 20 INJECTION, SOLUTION INTRAVENOUS at 15:51

## 2022-03-17 NOTE — ANESTHESIA POSTPROCEDURE EVALUATION
Patient: Kb Minaya    Procedure Summary     Date: 03/17/22 Room / Location: [facility] ENDOSCOPY 2 / [facility] ENDOSCOPY    Anesthesia Start: 1546 Anesthesia Stop:     Procedure: ESOPHAGOGASTRODUODENOSCOPY WITH Biopsy (N/A ) Diagnosis:       Hematemesis with nausea      Right upper quadrant abdominal pain      (Hematemesis with nausea [K92.0])      (Right upper quadrant abdominal pain [R10.11])    Surgeons: Yaneth Bell MD Provider: Raza De León CRNA    Anesthesia Type: MAC ASA Status: 4          Anesthesia Type: MAC    Vitals  HR 81  Sat 97  Resp 15  /89  Temp 98        Post Anesthesia Care and Evaluation    Patient location during evaluation: bedside  Patient participation: complete - patient participated  Level of consciousness: awake and alert and sleepy but conscious  Pain score: 0  Pain management: adequate  Airway patency: patent  Anesthetic complications: No anesthetic complications  PONV Status: none  Cardiovascular status: acceptable  Respiratory status: acceptable and nasal cannula  Hydration status: acceptable

## 2022-03-17 NOTE — ANESTHESIA PREPROCEDURE EVALUATION
Anesthesia Evaluation     Patient summary reviewed and Nursing notes reviewed   no history of anesthetic complications:  NPO Solid Status: > 8 hours  NPO Liquid Status: > 8 hours           Airway   Mallampati: II  TM distance: >3 FB  Neck ROM: limited  Possible difficult intubation  Dental - normal exam     Pulmonary    (+) asthma,shortness of breath, sleep apnea on CPAP, decreased breath sounds,   Cardiovascular - normal exam    PT is on anticoagulation therapy  Patient on routine beta blocker    (+) hypertension well controlled 2 medications or greater, past MI  6-12 months, CAD, cardiac stents more than 12 months ago angina, STOKES, PVD, DVT,       Neuro/Psych  (+) psychiatric history Anxiety and Depression,    GI/Hepatic/Renal/Endo    (+) obesity, morbid obesity, GERD, GI bleeding upper , liver disease fatty liver disease, renal disease stones, diabetes mellitus type 2,     Musculoskeletal     (+) back pain,   Abdominal  - normal exam   Substance History      OB/GYN          Other        ROS/Med Hx Other: Non compliant with cpap   Labs reviewed                  Anesthesia Plan    ASA 4     MAC   (Risks and benefits discussed including risk of aspiration, recall and dental damage. All patient questions answered.    Will continue with plan of care.)  intravenous induction     Anesthetic plan, all risks, benefits, and alternatives have been provided, discussed and informed consent has been obtained with: patient.    Plan discussed with CRNA.

## 2022-03-17 NOTE — DISCHARGE INSTRUCTIONS
Rest today  No pushing,pulling,tugging,heavy lifting, or strenuous activity   No major decision making,driving,or drinking alcoholic beverages for 24 hours due to the sedation you received  Always use good hand hygiene/washing technique  No driving on pain medication.     - Discharge patient to home (ambulatory).   - Low sodium diet.   - Continue present medications.   - To start on non selective beta blockers as op   - PPI daily  - OK to start ASA/Plavix after 24 hours  - Await pathology results.   - Repeat upper endoscopy in 2 years for surveillance.   - Return to GI office in 8 weeks.

## 2022-03-18 ENCOUNTER — TELEPHONE (OUTPATIENT)
Dept: GASTROENTEROLOGY | Facility: CLINIC | Age: 52
End: 2022-03-18

## 2022-03-18 NOTE — TELEPHONE ENCOUNTER
PRIOR AUTHORIZATION FOR PANTOPRAZOLE 20 MG BID HAS BEEN SUBMITTED AND APPROVED FROM 03/18/22-03/17/23

## 2022-03-22 LAB
LAB AP CASE REPORT: NORMAL
PATH REPORT.FINAL DX SPEC: NORMAL

## 2022-05-16 ENCOUNTER — OFFICE VISIT (OUTPATIENT)
Dept: GASTROENTEROLOGY | Facility: CLINIC | Age: 52
End: 2022-05-16

## 2022-05-16 VITALS
WEIGHT: 281.6 LBS | BODY MASS INDEX: 37.32 KG/M2 | HEIGHT: 73 IN | DIASTOLIC BLOOD PRESSURE: 80 MMHG | SYSTOLIC BLOOD PRESSURE: 140 MMHG

## 2022-05-16 DIAGNOSIS — K31.89 PORTAL HYPERTENSIVE GASTROPATHY: ICD-10-CM

## 2022-05-16 DIAGNOSIS — K74.60 CIRRHOSIS OF LIVER WITHOUT ASCITES, UNSPECIFIED HEPATIC CIRRHOSIS TYPE: ICD-10-CM

## 2022-05-16 DIAGNOSIS — K52.9 CHRONIC DIARRHEA: Primary | ICD-10-CM

## 2022-05-16 DIAGNOSIS — K76.6 PORTAL HYPERTENSIVE GASTROPATHY: ICD-10-CM

## 2022-05-16 DIAGNOSIS — I85.00 IDIOPATHIC ESOPHAGEAL VARICES WITHOUT BLEEDING: ICD-10-CM

## 2022-05-16 DIAGNOSIS — K21.9 GASTROESOPHAGEAL REFLUX DISEASE WITHOUT ESOPHAGITIS: ICD-10-CM

## 2022-05-16 PROCEDURE — 99214 OFFICE O/P EST MOD 30 MIN: CPT | Performed by: INTERNAL MEDICINE

## 2022-05-16 NOTE — PROGRESS NOTES
Follow Up Note     Date: 2022   Patient Name: Kb Minaya  MRN: [id number]  : 1970     Referring Physician: Vilma Cassidy MD    Chief Complaint:    Chief Complaint   Patient presents with   • Follow-up   • Nausea       Interval History:   2022  Kb Minaya is a 52 y.o. male who is here today for follow up for his nausea, abdominal pain and diarrhea.  He states that he still has loose stool anywhere 2-3 per day.  As per patient cardio recommended him to stop the colestipol and he stopped it.       time.  He has been having intermittent abdominal pain mostly RUQ, RLQ and occasionally lower abdomen over 5 years. Associated nausea occasional vomiting. intermittently and loose stool, watery diarrhea. He will have anywhere loose stool 1-3 times per day. This patient deny any hematochezia or melena.  Weight is stable. No odynophagia or dysphagia. There is history of acid reflux on ppi now with decent control on symptoms. There is no history of anemia.  Last EGD was on 2021 by Dr. Diaz, gastroenterology revealed mild gastritis and intestinal metaplasia.  Esophagus reported as normal without any esophageal varices. Last colonoscopy was year ago at [facility], advanced polyos removed as per pt. Polyps removed and advised to repeat colon in one year.      He had a laparoscopic cholecystectomy done on 2021 for a biliary dyskinesia and gallbladder sludge noted with ultrasound done in 2021.  His pathology revealed chronic cholecystitis and focal cholesterolosis.  Laparoscopically liver was nodular suggesting cirrhosis.  Biopsies obtained revealed steatohepatitis grade 2 of the 3 and page 4 with a 4.  Patient is obese, hypertensive and diabetic indicating metabolic syndrome.  He had a last CT scan abdomen pelvis done in 2020 which revealed signs of cirrhosis.  No family history of any liver cirrhosis.  No prior history of any chronic hepatitis.     No family  "history of colon cancer or any GI malignancy. Dad had colon Ca. No history of any abdominal surgery. Denies alcohol abuse or cigarette smoking.  He has a prior history of coronary artery disease and MI in 2020 and 2012.  He had a cardiac stent placed in 2006  He is on ASA/Plavix/ Pletal.     Subjective      Past Medical History:   Past Medical History:   Diagnosis Date   • Ankle fracture, left    • Anxiety    • Anxiety and depression    • Asthma    • Back pain    • Blood clot in vein 01/2021    right leg-states has resolved now   • Cirrhosis (HCC)    • CKD (chronic kidney disease)     pt denies 7/6/21   • Coronary artery disease    • Depression    • Diabetes mellitus (HCC)    • Esophageal varices (HCC) 003/01/2022   • Fatty liver    • GERD (gastroesophageal reflux disease)    • H/O echocardiogram 02/2021   • History of nuclear stress test 01/2020    \"I did ok with it\"   • Hypertension    • Kidney stone    • Murmur    • Myocardial infarction (HCC)     MI IN 2006 (stent x 1), 2012 (no stents), 8/2/2020 (no stents with last MI).  Just saw Dr. Duenas recently for a f/u.  (assessed 11/22/21)   • Pilonidal cyst    • Plantar fasciitis     bilateral   • Seasonal allergies    • Sleep apnea     CPAP-\"I don't wear it every night\"   • Urinary frequency    • Wears glasses    • Wrist fracture, left      Past Surgical History:   Past Surgical History:   Procedure Laterality Date   • APPENDECTOMY     • CARDIAC CATHETERIZATION  2006    stent x 1   • CARDIAC CATHETERIZATION  2012    no stents   • CHOLECYSTECTOMY  08/23/2021   • COLONOSCOPY     • COLONOSCOPY N/A 11/23/2021    Procedure: COLONOSCOPY with biopsies;  Surgeon: Yaneth Bell MD;  Location: [facility] ENDOSCOPY;  Service: Gastroenterology;  Laterality: N/A;   • CYST REMOVAL      lower part of spine.   • ENDOSCOPY     • ENDOSCOPY N/A 07/07/2021    Procedure: ESOPHAGOGASTRODUODENOSCOPY WITH BOPSIES;  Surgeon: Arjun Delgadillo MD;  Location: [facility] ENDOSCOPY;  Service: " Gastroenterology;  Laterality: N/A;   • ENDOSCOPY W/ BANDING N/A 03/17/2022    Procedure: ESOPHAGOGASTRODUODENOSCOPY WITH Biopsy;  Surgeon: Yaneth Bell MD;  Location: [facility] ENDOSCOPY;  Service: Gastroenterology;  Laterality: N/A;   • PLANTAR FASCIA RELEASE      right   • SKIN BIOPSY      benign       Family History:   Family History   Problem Relation Age of Onset   • Diabetes Mother    • COPD Mother    • Heart failure Mother    • Heart disease Father    • Lung cancer Father        Social History:   Social History     Socioeconomic History   • Marital status:    Tobacco Use   • Smoking status: Never Smoker   • Smokeless tobacco: Never Used   Vaping Use   • Vaping Use: Never used   Substance and Sexual Activity   • Alcohol use: Not Currently   • Drug use: No   • Sexual activity: Yes     Partners: Female       Medications:     Current Outpatient Medications:   •  carvedilol (COREG) 6.25 MG tablet, Take 6.25 mg by mouth 2 (Two) Times a Day With Meals. Last Wednesday - patient out of medication., Disp: , Rfl:   •  dicyclomine (BENTYL) 20 MG tablet, Take 1 tablet by mouth 3 (Three) Times a Day As Needed (abdominal pain)., Disp: 60 tablet, Rfl: 1  •  glyburide (DIAbeta) 5 MG tablet, Take 10 mg by mouth 2 (Two) Times a Day With Meals., Disp: , Rfl:   •  hydrOXYzine (ATARAX) 10 MG tablet, Take 10 mg by mouth 2 (Two) Times a Day As Needed. for anxiety, Disp: , Rfl:   •  pantoprazole (Protonix) 20 MG EC tablet, Take 1 tablet by mouth 2 (Two) Times a Day., Disp: 60 tablet, Rfl: 5  •  Semaglutide,0.25 or 0.5MG/DOS, (Ozempic, 0.25 or 0.5 MG/DOSE,) 2 MG/1.5ML solution pen-injector, Inject 0.5 mg under the skin into the appropriate area as directed 1 (One) Time Per Week., Disp: , Rfl:   •  venlafaxine XR (EFFEXOR-XR) 150 MG 24 hr capsule, Take 150 mg by mouth Daily., Disp: , Rfl:   •  Ventolin  (90 Base) MCG/ACT inhaler, Inhale 1-2 puffs As Needed. every 4 to 6 hours, Disp: , Rfl:   •  vitamin E 400 UNIT  "capsule, Take 400 Units by mouth 2 (Two) Times a Day., Disp: , Rfl:     Allergies:   Allergies   Allergen Reactions   • Morphine And Related Anaphylaxis   • Simvastatin Other (See Comments)     Liver problems   • Penicillins Hives   • Pradaxa [Dabigatran Etexilate Mesylate] Hives       Review of Systems:   Review of Systems   Constitutional: Negative for appetite change, fatigue, fever and unexpected weight loss.   HENT: Negative for trouble swallowing.    Gastrointestinal: Positive for abdominal pain, diarrhea and nausea. Negative for abdominal distention, anal bleeding, blood in stool, constipation, rectal pain, vomiting, GERD and indigestion.       The following portions of the patient's history were reviewed and updated as appropriate: allergies, current medications, past family history, past medical history, past social history, past surgical history and problem list.    Objective     Physical Exam:  Vital Signs:   Vitals:    05/16/22 1405   BP: 140/80   Weight: 128 kg (281 lb 9.6 oz)   Height: 185.4 cm (73\")       Physical Exam  Constitutional:       Appearance: He is obese.   HENT:      Head: Normocephalic and atraumatic.   Eyes:      Conjunctiva/sclera: Conjunctivae normal.   Abdominal:      General: Abdomen is flat. There is no distension.      Palpations: There is no mass.      Tenderness: There is no abdominal tenderness. There is no guarding or rebound.      Hernia: No hernia is present.      Comments: Large fatty abdominal wall   Musculoskeletal:      Cervical back: Normal range of motion and neck supple.   Neurological:      Mental Status: He is alert.         Results Review:   I reviewed the patient's new clinical results.    Admission on 03/17/2022, Discharged on 03/17/2022   Component Date Value Ref Range Status   • Glucose 03/17/2022 188 (A) 70 - 130 mg/dL Final    Serial Number: [id number]:  [id number]   • Case Report 03/17/2022    Final                    Value:Surgical Pathology Report       "                   Case: [id number]                                  Authorizing Provider:  Yaneth Bell MD  Collected:           03/17/2022 03:54 PM          Ordering Location:     [facility]    Received:            03/18/2022 08:59 AM                                 [facility]                                                                    Pathologist:           Chaitanya Denney MD                                                     Specimens:   1) - Small Intestine, DUODENUM FOR CHRONIC DIARRHEA                                                 2) - Gastric, Body, GASTRIC FOR H PYLORI                                                  • Final Diagnosis 03/17/2022    Final                    Value:This result contains rich text formatting which cannot be displayed here.   Lab on 03/15/2022   Component Date Value Ref Range Status   • COVID19 03/15/2022 Not Detected  Not Detected - Ref. Range Final   Lab on 03/01/2022   Component Date Value Ref Range Status   • Protime 03/01/2022 13.3  12.0 - 15.1 Seconds Final   • INR 03/01/2022 0.96  0.90 - 1.10 Final   • WBC 03/01/2022 6.12  3.40 - 10.80 10*3/mm3 Final   • RBC 03/01/2022 5.36  4.14 - 5.80 10*6/mm3 Final   • Hemoglobin 03/01/2022 17.0  13.0 - 17.7 g/dL Final   • Hematocrit 03/01/2022 50.1  37.5 - 51.0 % Final   • MCV 03/01/2022 93.5  79.0 - 97.0 fL Final   • MCH 03/01/2022 31.7  26.6 - 33.0 pg Final   • MCHC 03/01/2022 33.9  31.5 - 35.7 g/dL Final   • RDW 03/01/2022 12.9  12.3 - 15.4 % Final   • RDW-SD 03/01/2022 44.0  37.0 - 54.0 fl Final   • MPV 03/01/2022 11.4  6.0 - 12.0 fL Final   • Platelets 03/01/2022 166  140 - 450 10*3/mm3 Final   • Neutrophil % 03/01/2022 61.4  42.7 - 76.0 % Final   • Lymphocyte % 03/01/2022 30.9  19.6 - 45.3 % Final   • Monocyte % 03/01/2022 5.4  5.0 - 12.0 % Final   • Eosinophil % 03/01/2022 1.6  0.3 - 6.2 % Final   • Basophil % 03/01/2022 0.7  0.0 - 1.5 % Final   • Immature Grans % 03/01/2022 0.0  0.0 - 0.5 %  Final   • Neutrophils, Absolute 03/01/2022 3.76  1.70 - 7.00 10*3/mm3 Final   • Lymphocytes, Absolute 03/01/2022 1.89  0.70 - 3.10 10*3/mm3 Final   • Monocytes, Absolute 03/01/2022 0.33  0.10 - 0.90 10*3/mm3 Final   • Eosinophils, Absolute 03/01/2022 0.10  0.00 - 0.40 10*3/mm3 Final   • Basophils, Absolute 03/01/2022 0.04  0.00 - 0.20 10*3/mm3 Final   • Immature Grans, Absolute 03/01/2022 0.00  0.00 - 0.05 10*3/mm3 Final   • nRBC 03/01/2022 0.0  0.0 - 0.2 /100 WBC Final   • Glucose 03/01/2022 190 (A) 65 - 99 mg/dL Final   • BUN 03/01/2022 9  6 - 20 mg/dL Final   • Creatinine 03/01/2022 0.82  0.76 - 1.27 mg/dL Final   • Sodium 03/01/2022 139  136 - 145 mmol/L Final   • Potassium 03/01/2022 4.6  3.5 - 5.2 mmol/L Final    Slight hemolysis detected by analyzer. Results may be affected.   • Chloride 03/01/2022 106  98 - 107 mmol/L Final   • CO2 03/01/2022 23.7  22.0 - 29.0 mmol/L Final   • Calcium 03/01/2022 8.8  8.6 - 10.5 mg/dL Final   • Total Protein 03/01/2022 6.9  6.0 - 8.5 g/dL Final   • Albumin 03/01/2022 3.60  3.50 - 5.20 g/dL Final   • ALT (SGPT) 03/01/2022 116 (A) 1 - 41 U/L Final   • AST (SGOT) 03/01/2022 74 (A) 1 - 40 U/L Final   • Alkaline Phosphatase 03/01/2022 118 (A) 39 - 117 U/L Final   • Total Bilirubin 03/01/2022 0.5  0.0 - 1.2 mg/dL Final   • Globulin 03/01/2022 3.3  gm/dL Final   • A/G Ratio 03/01/2022 1.1  g/dL Final   • BUN/Creatinine Ratio 03/01/2022 11.0  7.0 - 25.0 Final   • Anion Gap 03/01/2022 9.3  5.0 - 15.0 mmol/L Final   • eGFR 03/01/2022 104.4  >60.0 mL/min/1.73 Final    National Kidney Foundation and American Society of Nephrology (ASN) Task Force recommended calculation based on the Chronic Kidney Disease Epidemiology Collaboration (CKD-EPI) equation refit without adjustment for race.   Admission on 02/20/2022, Discharged on 02/20/2022   Component Date Value Ref Range Status   • Glucose 02/20/2022 219 (A) 65 - 99 mg/dL Final    Glucose >180, Hemoglobin A1C recommended.   • BUN 02/20/2022  14  6 - 20 mg/dL Final   • Creatinine 02/20/2022 0.85  0.76 - 1.27 mg/dL Final   • Sodium 02/20/2022 136  136 - 145 mmol/L Final   • Potassium 02/20/2022 4.1  3.5 - 5.2 mmol/L Final    Slight hemolysis detected by analyzer. Results may be affected.   • Chloride 02/20/2022 102  98 - 107 mmol/L Final   • CO2 02/20/2022 22.6  22.0 - 29.0 mmol/L Final   • Calcium 02/20/2022 8.8  8.6 - 10.5 mg/dL Final   • Total Protein 02/20/2022 7.1  6.0 - 8.5 g/dL Final   • Albumin 02/20/2022 3.70  3.50 - 5.20 g/dL Final   • ALT (SGPT) 02/20/2022 82 (A) 1 - 41 U/L Final   • AST (SGOT) 02/20/2022 47 (A) 1 - 40 U/L Final    Slight hemolysis detected by analyzer. Results may be affected.   • Alkaline Phosphatase 02/20/2022 124 (A) 39 - 117 U/L Final   • Total Bilirubin 02/20/2022 0.9  0.0 - 1.2 mg/dL Final   • eGFR Non  Amer 02/20/2022 94  >60 mL/min/1.73 Final   • Globulin 02/20/2022 3.4  gm/dL Final   • A/G Ratio 02/20/2022 1.1  g/dL Final   • BUN/Creatinine Ratio 02/20/2022 16.5  7.0 - 25.0 Final   • Anion Gap 02/20/2022 11.4  5.0 - 15.0 mmol/L Final   • Lipase 02/20/2022 17  13 - 60 U/L Final   • Color, UA 02/20/2022 Dark Yellow (A) Yellow, Straw Final   • Appearance, UA 02/20/2022 Clear  Clear Final   • pH, UA 02/20/2022 5.5  5.0 - 8.0 Final   • Specific Gravity, UA 02/20/2022 >=1.030  1.005 - 1.030 Final   • Glucose, UA 02/20/2022 >=1000 mg/dL (3+) (A) Negative Final   • Ketones, UA 02/20/2022 Trace (A) Negative Final   • Bilirubin, UA 02/20/2022 Small (1+) (A) Negative Final   • Blood, UA 02/20/2022 Negative  Negative Final   • Protein, UA 02/20/2022 Trace (A) Negative Final   • Leuk Esterase, UA 02/20/2022 Negative  Negative Final   • Nitrite, UA 02/20/2022 Negative  Negative Final   • Urobilinogen, UA 02/20/2022 1.0 E.U./dL  0.2 - 1.0 E.U./dL Final   • Protime 02/20/2022 13.7  12.0 - 15.1 Seconds Final   • INR 02/20/2022 1.00  0.90 - 1.10 Final   • PTT 02/20/2022 29.1  24.5 - 37.2 seconds Final   • Troponin T 02/20/2022  <0.010  0.000 - 0.030 ng/mL Final   • WBC 02/20/2022 6.77  3.40 - 10.80 10*3/mm3 Final   • RBC 02/20/2022 5.46  4.14 - 5.80 10*6/mm3 Final   • Hemoglobin 02/20/2022 17.3  13.0 - 17.7 g/dL Final   • Hematocrit 02/20/2022 48.7  37.5 - 51.0 % Final   • MCV 02/20/2022 89.2  79.0 - 97.0 fL Final   • MCH 02/20/2022 31.7  26.6 - 33.0 pg Final   • MCHC 02/20/2022 35.5  31.5 - 35.7 g/dL Final   • RDW 02/20/2022 13.6  12.3 - 15.4 % Final   • RDW-SD 02/20/2022 44.0  37.0 - 54.0 fl Final   • MPV 02/20/2022 11.3  6.0 - 12.0 fL Final   • Platelets 02/20/2022 99 (A) 140 - 450 10*3/mm3 Final   • Neutrophil % 02/20/2022 82.6 (A) 42.7 - 76.0 % Final   • Lymphocyte % 02/20/2022 12.4 (A) 19.6 - 45.3 % Final   • Monocyte % 02/20/2022 3.8 (A) 5.0 - 12.0 % Final   • Eosinophil % 02/20/2022 0.4  0.3 - 6.2 % Final   • Basophil % 02/20/2022 0.4  0.0 - 1.5 % Final   • Immature Grans % 02/20/2022 0.4  0.0 - 0.5 % Final   • Neutrophils, Absolute 02/20/2022 5.58  1.70 - 7.00 10*3/mm3 Final   • Lymphocytes, Absolute 02/20/2022 0.84  0.70 - 3.10 10*3/mm3 Final   • Monocytes, Absolute 02/20/2022 0.26  0.10 - 0.90 10*3/mm3 Final   • Eosinophils, Absolute 02/20/2022 0.03  0.00 - 0.40 10*3/mm3 Final   • Basophils, Absolute 02/20/2022 0.03  0.00 - 0.20 10*3/mm3 Final   • Immature Grans, Absolute 02/20/2022 0.03  0.00 - 0.05 10*3/mm3 Final   • nRBC 02/20/2022 0.0  0.0 - 0.2 /100 WBC Final      XR Shoulder 2+ View Right    Result Date: 2/21/2022  No acute fracture.  Degenerative changes at the AC joint.    Images were reviewed, interpreted, and dictated by Dr. Chi Patel M.D. Transcribed by Geetha Alejandra PA-C.  This report was signed and finalized on 2/21/2022 10:38 AM by Chi Patel M.D..    CT Head Without Contrast    Result Date: 2/20/2022  No acute intracranial hemorrhage or large acute cortical infarct. Authenticated by Johnathan Parker MD on 02/20/2022 10:12:20 PM    MRI Abdomen With & Without Contrast    Result Date: 3/1/2022  1. A  16 mm enhancing mass within the central right liver. Favor hemangioma over hepatocellular carcinoma. Recommend 6-month MR follow-up particularly since lesion is not evident on CT. 2. Findings of cirrhosis with portal hypertension.  This report was signed and finalized on 3/1/2022 3:29 PM by Elmo Valle MD.    CT Abdomen Pelvis With Contrast    Result Date: 2/20/2022  Cirrhosis and splenomegaly.  Portal hypertension and small esophageal varices. Authenticated by Johnathan Parker MD on 02/20/2022 10:53:37 PM    CT Chest Pulmonary Embolism    Result Date: 2/20/2022  No pulmonary embolus.  No acute findings in the chest. Authenticated by Johnathan Parker MD on 02/20/2022 10:23:04 PM    EGD  3/17/2022  - Normal oropharynx.  - Z-line regular, 40 cm from the incisors.  - Grade I esophageal varices.  - Erosive gastropathy with stigmata of recent bleeding. Biopsied.  - Mild Portal hypertensive gastropathy.  - Normal duodenal bulb, first portion of the duodenum, second portion of the duodenum and third portion of the  duodenum. Biopsied.  - Likely had coffee ground from erosions    Path; Duodenum normal, Gatsric  h pylori -ve, reactive gastropathy    Assessment / Plan      1.  Nausea vomiting   2.  Change in bowel habit  Suspected IBS with diarrhea/diabetic diarrhea  5/16/2022  History and work-up so far reveals a possible IBS with the diarrhea.  Diabetes mellitus possibly contributing to his symptoms.  As per patient cardiology stopped his colestipol.  Patient is not taking any medication for diarrhea now.  Recent EGD done on 3/17/2022 did not reveal any signs of celiac and duodenal biopsies were negative.    Advised to start him on Imodium half pill p.o. daily.  Hold if there is any constipation   Dicyclomine 20 m p.o. twice daily as needed  We will get a stool for calprotectin and fecal elastase to rule out EPI.    3/2/2022  Patient does have some functional issues including possible irritable bowel causing him nausea,  abdominal pain and change in bowel habit.   This could be a Bela-Liz tear versus peptic ulcer disease in the setting of aspirin Plavix use versus rare possibility for variceal bleeding although unlikely.  His lab work reviewed including the hemoglobin which was normal.     He had a CT abdomen pelvis done in the emergency room which revealed a cirrhosis and splenomegaly and also showed signs of portal hypertension with his esophageal varices.  He had a EGD done in July 2021 by Dr. Diaz and was reported as normal.  With patient having hematemesis and CT imaging showing possible esophageal varices we will schedule him for an urgent EGD for further evaluation with possible esophageal banding.    Prior history  3. Compensated NEW cirrhosis of liver without ascites, unspecified hepatic cirrhosis type (CMS/HCC); MELD; 7 (8/2021), 6 (3/2022)  4.  Portal hypertensive gastropathy  5.  Grade 1 esophageal varices  5/16/2022  EGD done on 3/17/2022 revealed grade 1 esophageal varices and mild portal hypertensive gastropathy.  Patient is currently on carvedilol 6.25 mils p.o. twice daily we will continue the same.  Recent imaging did not reveal any ascites.  Patient lost about 10 pounds since last visit.    Advised to continue to lose weight to less than 200 pounds.  He needs ultrasound abdomen for HCC surveillance and to rule out ascites in 6 months time in August 2022  Needs a surveillance EGD in 2 years time in May 2024    12/1/2021  Unfortunately instead of losing weight patient gained about 5 pounds since the last visit.  He is not immune to hepatitis a and B hep C antibody is negative.  BRENDA, anti-smooth muscle antibody, antimitochondrial antibody negative.  Iron studies studies reveal a normal iron saturation ruling out hemochromatosis.  Alpha-1 antitrypsin levels were normal.  His New FibroSure test done revealed F1-F2 fibrosis, N1 steatohepatitis, S3 steatosis which is not consistent with the radiologic findings  and the liver biopsy findings.  However patient lab studies, CT scan findings and liver biopsy are more in favor of cirrhosis  Advised to take a combined hep a and B vaccine at PCPs office or health center.  Counseled on avoid gaining any further weight.  We will follow-up with repeat labs in 6 months time.  Ultrasound abdomen for HCC surveillance in January 2022 and EGD in 2023 for esophageal variceal surveillance.  Continue vitamin E for now     8/30/2021  Patient history, prior work-up, clinical finding is not more in favor of compensated liver cirrhosis secondary to NEW without ascites  He had a elevated liver enzymes over 8 years now.  Last blood work on record was in 2014 with elevated liver enzymes and thrombocytopenia.  His recent EGD done in July 2021 by Dr. Diaz did not reveal any esophageal varices.  Ultrasound abdomen in July 2021 did not reveal any liver lesions.  Liver biopsy done during the laparoscopic cholecystectomy revealed a grade 2 through 3 steatohepatitis with the stage 4 fibrosis suggesting cirrhosis.  He is nonalcoholic and non-smoker.     6. Gastroesophageal reflux disease without esophagitis  Patient is currently taking Protonix 20 m p.o. twice daily.  We will change the Protonix to as needed for once daily next visit.  No significant reflux symptoms now    Prior history  7. Personal history of colonic polyps  Colonoscopy done on 11/23/2021 did not reveal any colon polyps.  No endoscopic signs of any colitis colonic biopsies and terminal ileum biopsies were normal. Due to his advanced polyp removed in the past will repeat colonoscopy in 5 years time in 2026   As per patient he had a colonoscopy done a year ago at [place].  He had 5 polyps removed one of them was large and was advised to repeat colonoscopy in 1 year time.         Follow Up:   No follow-ups on file.    Yaneth Bell MD  [facility]  5/16/2022  14:08 EDT     Please note that portions of this note  may have been completed with a voice recognition program.

## 2022-07-05 ENCOUNTER — TRANSCRIBE ORDERS (OUTPATIENT)
Dept: ADMINISTRATIVE | Facility: HOSPITAL | Age: 52
End: 2022-07-05

## 2022-07-05 DIAGNOSIS — M54.2 CERVICALGIA: Primary | ICD-10-CM

## 2022-07-06 ENCOUNTER — HOSPITAL ENCOUNTER (OUTPATIENT)
Dept: GENERAL RADIOLOGY | Facility: HOSPITAL | Age: 52
Discharge: HOME | End: 2022-07-06
Admitting: FAMILY MEDICINE

## 2022-07-06 ENCOUNTER — TRANSCRIBE ORDERS (OUTPATIENT)
Dept: GENERAL RADIOLOGY | Facility: HOSPITAL | Age: 52
End: 2022-07-06

## 2022-07-06 ENCOUNTER — OFFICE VISIT (OUTPATIENT)
Dept: PULMONOLOGY | Facility: CLINIC | Age: 52
End: 2022-07-06

## 2022-07-06 VITALS
WEIGHT: 279 LBS | SYSTOLIC BLOOD PRESSURE: 130 MMHG | DIASTOLIC BLOOD PRESSURE: 82 MMHG | HEART RATE: 83 BPM | RESPIRATION RATE: 16 BRPM | BODY MASS INDEX: 36.98 KG/M2 | OXYGEN SATURATION: 95 % | HEIGHT: 73 IN

## 2022-07-06 DIAGNOSIS — G47.33 OBSTRUCTIVE SLEEP APNEA: ICD-10-CM

## 2022-07-06 DIAGNOSIS — R06.83 SNORING: ICD-10-CM

## 2022-07-06 DIAGNOSIS — G47.19 EXCESSIVE DAYTIME SLEEPINESS: ICD-10-CM

## 2022-07-06 DIAGNOSIS — Z01.818 PREOPERATIVE CLEARANCE: ICD-10-CM

## 2022-07-06 DIAGNOSIS — J45.40 MODERATE PERSISTENT ASTHMA WITHOUT COMPLICATION: ICD-10-CM

## 2022-07-06 DIAGNOSIS — E66.9 OBESITY (BMI 30-39.9): ICD-10-CM

## 2022-07-06 DIAGNOSIS — R06.02 SHORTNESS OF BREATH: Primary | ICD-10-CM

## 2022-07-06 DIAGNOSIS — Z01.818 PREOPERATIVE CLEARANCE: Primary | ICD-10-CM

## 2022-07-06 PROCEDURE — 99244 OFF/OP CNSLTJ NEW/EST MOD 40: CPT | Performed by: INTERNAL MEDICINE

## 2022-07-06 PROCEDURE — 71046 X-RAY EXAM CHEST 2 VIEWS: CPT

## 2022-07-06 RX ORDER — BUDESONIDE AND FORMOTEROL FUMARATE DIHYDRATE 160; 4.5 UG/1; UG/1
2 AEROSOL RESPIRATORY (INHALATION)
Qty: 1 EACH | Refills: 5 | Status: SHIPPED | OUTPATIENT
Start: 2022-07-06

## 2022-07-06 RX ORDER — EMPAGLIFLOZIN 25 MG/1
TABLET, FILM COATED ORAL
COMMUNITY
Start: 2022-06-09

## 2022-07-06 RX ORDER — PROCHLORPERAZINE 25 MG/1
SUPPOSITORY RECTAL
COMMUNITY
Start: 2022-05-23 | End: 2023-03-08

## 2022-07-06 NOTE — PROGRESS NOTES
CONSULT NOTE    Requested by:   Vilma Cassidy Jessica Abney, MD      Chief Complaint   Patient presents with   • Consult   • Breathing Problem       Subjective:  Kb Minaya is a 52 y.o. male.     History of Present Illness   Patient comes in today for consultation because of shortness of breath for the past few years    The patient says that his shortness of breath is more pronounced during the evening. It is also usually associated with wheezing. Patient says that he rarely has a cough that usually follow activity & seasonal changes.    The patient has multiple dogs and a cat at home. There seems to be a seasonal variation to the symptoms. He also feels worse during hot and humid conditions.     The patient does have a family history of asthma, in his mother. He reports known personal history of asthma.    The patient denies a history of smoking. His parents smoked heavily around him, when he was younger.     Patient was evaluated today for follow up of Sleep apnea. Patient does report initial improvement.    Patient reports slight worsening in daytime sleepiness when compared to before.     Patient says that he has been unable to use the device for more than 2 months now as it started smelling like something was burning inside.     The following portions of the patient's history were reviewed and updated as appropriate: allergies, current medications, past family history, past medical history, past social history and past surgical history.    Review of Systems   HENT: Positive for sinus pressure.    Respiratory: Positive for shortness of breath.    Cardiovascular: Positive for palpitations.   Psychiatric/Behavioral: Positive for sleep disturbance.   All other systems reviewed and are negative.      Past Medical History:   Diagnosis Date   • Ankle fracture, left    • Anxiety    • Anxiety and depression    • Asthma    • Back pain    • Blood clot in vein 01/2021    right leg-states  "has resolved now   • Cirrhosis (HCC)    • CKD (chronic kidney disease)     pt denies 7/6/21   • Coronary artery disease    • Depression    • Diabetes mellitus (HCC)    • Esophageal varices (HCC) 003/01/2022   • Fatty liver    • GERD (gastroesophageal reflux disease)    • H/O echocardiogram 02/2021   • History of nuclear stress test 01/2020    \"I did ok with it\"   • Hypertension    • Kidney stone    • Murmur    • Myocardial infarction (HCC)     MI IN 2006 (stent x 1), 2012 (no stents), 8/2/2020 (no stents with last MI).  Just saw Dr. Duenas recently for a f/u.  (assessed 11/22/21)   • Pilonidal cyst    • Plantar fasciitis     bilateral   • Seasonal allergies    • Sleep apnea     CPAP-\"I don't wear it every night\"   • Urinary frequency    • Wears glasses    • Wrist fracture, left        Social History     Tobacco Use   • Smoking status: Never Smoker   • Smokeless tobacco: Never Used   Substance Use Topics   • Alcohol use: Not Currently         Objective:  Visit Vitals  /82   Pulse 83   Resp 16   Ht 185.4 cm (73\")   Wt 127 kg (279 lb)   SpO2 95%   BMI 36.81 kg/m²       Physical Exam  Vitals reviewed.   Constitutional:       Appearance: He is well-developed.   HENT:      Head: Atraumatic.      Mouth/Throat:      Comments: Oropharynx was crowded.  Eyes:      Pupils: Pupils are equal, round, and reactive to light.   Neck:      Thyroid: No thyromegaly.      Vascular: No JVD.      Trachea: No tracheal deviation.      Comments: Increased neck circumference noted.  Cardiovascular:      Rate and Rhythm: Normal rate and regular rhythm.   Pulmonary:      Effort: Pulmonary effort is normal. No respiratory distress.      Breath sounds: Normal breath sounds.   Musculoskeletal:      Right lower leg: No edema.      Left lower leg: No edema.      Comments: Gait was normal.   Skin:     General: Skin is warm and dry.   Neurological:      Mental Status: He is alert and oriented to person, place, and time.   Psychiatric:         Mood " and Affect: Mood normal.         Behavior: Behavior normal.              Assessment/Plan:  Diagnoses and all orders for this visit:    1. Shortness of breath (Primary)  -     Pulmonary Function Test; Future  -     Nitric Oxide  -     Peak Flow    2. Moderate persistent asthma without complication  -     Pulmonary Function Test; Future  -     Nitric Oxide  -     Peak Flow    3. Obstructive sleep apnea  -     BIPAP / CPAP Adjustment  -     BIPAP / CPAP Adjustment    4. Snoring  -     BIPAP / CPAP Adjustment  -     BIPAP / CPAP Adjustment    5. Excessive daytime sleepiness  -     BIPAP / CPAP Adjustment    6. Obesity (BMI 30-39.9)  -     BIPAP / CPAP Adjustment    Other orders  -     budesonide-formoterol (Symbicort) 160-4.5 MCG/ACT inhaler; Inhale 2 puffs 2 (Two) Times a Day. Rinse mouth with water after use.  Dispense: 1 each; Refill: 5        Return in about 15 weeks (around 10/19/2022) for Recheck, PFT F/U, For Wanda Paredes), ....Also 7 mths w/ Dr. Lobato.    DISCUSSION(if any):  Last chest x-ray was reviewed personally and the results were shared with the patient.  Images reviewed personally.   Results for orders placed during the hospital encounter of 07/06/22    XR Chest 2 View (Preliminary)  This result has not been signed. Information might be incomplete.    Narrative  PROCEDURE: XR CHEST 2 VW-    HISTORY: Z01.818; Z01.818-Encounter for other preprocedural examination    COMPARISON: August 2021.    FINDINGS: The heart is normal in size. The mediastinum is unremarkable.  The lungs are clear. There is no pneumothorax. There are no acute  osseous abnormalities.    Impression  No acute cardiopulmonary process.      Images were reviewed, interpreted, and dictated by Dr. Carmen Sanford MD  Transcribed by Geetha Alejandra PA-C.      Last CT scan was reviewed in great detail with the patient. Images reviewed personally.   Results for orders placed during the hospital encounter of 02/20/22    CT Chest Pulmonary  Embolism    Narrative  FINAL REPORT    TECHNIQUE:  Multiple axial CT images were obtained through the chest  following IV contrast using a CTA/PE protocol.  3D/MIP  reconstruction images were also performed. This study was  performed with techniques to keep radiation doses as low as  reasonably achievable (ALARA). Individualized dose reduction  techniques using automated exposure control or adjustment of mA  and/or kV according to the patient's size were employed.    CLINICAL HISTORY:  Hemoptysis/hematemesis, fall, right side pain    FINDINGS:  PAs and aorta: No pulmonary embolus.  Thoracic aorta is  unremarkable.  No significant coronary artery calcifications.  Heart/mediastinum: No evidence for right heart strain.  No  pericardial effusion.  The heart is normal in size.  Lungs: Mild  atelectasis.  Otherwise the lungs are clear.  Lymph nodes: No  pathologically enlarged thoracic lymph nodes.  Pleura: No  pneumothorax or pleural effusion.  Chest Wall: No chest wall  contusion.  Bones: No acute fracture.    Impression  No pulmonary embolus.  No acute findings in the chest.    Authenticated by Johnathan Parker MD on 02/20/2022 10:23:04 PM        I have also reviewed his emergency visit note from February 2022 that mentions history of liver cirrhosis, bleeding esophageal varices along with right shoulder pain.    I also reviewed the patient's gastroenterologist note that mentions compensated Wilson cirrhosis, portal hypertensive gastropathy and grade 1 esophageal varices.    I also reviewed his last echocardiogram and shared the results with him.     ===========================  ===========================    PFTs were ordered for follow up visit.     Laboratory workup was also reviewed which showed   Lab Results   Component Value Date    HGB 17.0 03/01/2022    HGB 17.3 02/20/2022    HGB 16.1 08/29/2021   ,    Lab Results   Component Value Date    EOSABS 0.10 03/01/2022    EOSABS 0.03 02/20/2022    EOSABS 0.08 08/29/2021     & Laboratory workup also showed   Lab Results   Component Value Date    CO2 23.7 03/01/2022     ===========================  ===========================    FeNO level was 17 today.    Peak flow was 490 LPM today.    I had a detailed discussion with the patient regarding his symptoms that are very suggestive of asthma    Orders as above.    The patient was started on Symbicort with instructions to rinse his mouth with water after use.     The patient may be started on Singulair    Other contributing factors may also need to be treated and this will be discussed with the patient, if and when applicable    Patient was advised to use rescue inhaler for when necessary purposes    Patient was also advised to keep a log of the use of rescue inhaler.    We will try to obtain his last sleep study results from the performing facility, if not already scanned in our system.     I told the patient that his symptoms are consistent with poorly controlled sleep apnea.    Although his last sleep study was more than 5 years ago, his symptoms were under fair control when the CPAP was working fine. I have offered him a new device and AutoPap 6/16 will be ordered.     If the symptoms do not improve, even after above-mentioned measures, then he may have to undergo a full night titration study.    Patient was advised to call this office with any issues.    Weight loss was also discussed and advised.      Dictated utilizing Dragon dictation.    This document was electronically signed by Jonathan Lobato MD on 07/06/22 at 13:35 EDT

## 2022-07-20 ENCOUNTER — HOSPITAL ENCOUNTER (OUTPATIENT)
Dept: MRI IMAGING | Facility: HOSPITAL | Age: 52
Discharge: HOME | End: 2022-07-20
Admitting: ORTHOPAEDIC SURGERY

## 2022-07-20 DIAGNOSIS — M54.2 CERVICALGIA: ICD-10-CM

## 2022-07-20 PROCEDURE — 72141 MRI NECK SPINE W/O DYE: CPT

## 2022-07-21 ENCOUNTER — LAB (OUTPATIENT)
Dept: LAB | Facility: HOSPITAL | Age: 52
End: 2022-07-21

## 2022-07-21 ENCOUNTER — TRANSCRIBE ORDERS (OUTPATIENT)
Dept: LAB | Facility: HOSPITAL | Age: 52
End: 2022-07-21

## 2022-07-21 ENCOUNTER — HOSPITAL ENCOUNTER (OUTPATIENT)
Dept: GENERAL RADIOLOGY | Facility: HOSPITAL | Age: 52
Discharge: HOME | End: 2022-07-21

## 2022-07-21 DIAGNOSIS — Z01.818 PRE-OPERATIVE CLEARANCE: Primary | ICD-10-CM

## 2022-07-21 DIAGNOSIS — K74.60 CIRRHOSIS OF LIVER WITHOUT ASCITES, UNSPECIFIED HEPATIC CIRRHOSIS TYPE: ICD-10-CM

## 2022-07-21 DIAGNOSIS — Z01.818 PRE-OPERATIVE CLEARANCE: ICD-10-CM

## 2022-07-21 DIAGNOSIS — K52.9 CHRONIC DIARRHEA: ICD-10-CM

## 2022-07-21 LAB
ALBUMIN SERPL-MCNC: 3.8 G/DL (ref 3.5–5.2)
ALBUMIN/GLOB SERPL: 1.4 G/DL
ALP SERPL-CCNC: 91 U/L (ref 39–117)
ALT SERPL W P-5'-P-CCNC: 46 U/L (ref 1–41)
AMPHET+METHAMPHET UR QL: NEGATIVE
AMPHETAMINES UR QL: NEGATIVE
ANION GAP SERPL CALCULATED.3IONS-SCNC: 10.9 MMOL/L (ref 5–15)
AST SERPL-CCNC: 30 U/L (ref 1–40)
BARBITURATES UR QL SCN: NEGATIVE
BASOPHILS # BLD AUTO: 0.04 10*3/MM3 (ref 0–0.2)
BASOPHILS NFR BLD AUTO: 0.8 % (ref 0–1.5)
BENZODIAZ UR QL SCN: NEGATIVE
BILIRUB SERPL-MCNC: 0.4 MG/DL (ref 0–1.2)
BILIRUB UR QL STRIP: NEGATIVE
BUN SERPL-MCNC: 10 MG/DL (ref 6–20)
BUN/CREAT SERPL: 12.2 (ref 7–25)
BUPRENORPHINE SERPL-MCNC: NEGATIVE NG/ML
CALCIUM SPEC-SCNC: 9 MG/DL (ref 8.6–10.5)
CANNABINOIDS SERPL QL: NEGATIVE
CHLORIDE SERPL-SCNC: 109 MMOL/L (ref 98–107)
CLARITY UR: ABNORMAL
CO2 SERPL-SCNC: 20.1 MMOL/L (ref 22–29)
COCAINE UR QL: NEGATIVE
COLOR UR: YELLOW
CREAT SERPL-MCNC: 0.82 MG/DL (ref 0.76–1.27)
DEPRECATED RDW RBC AUTO: 42.9 FL (ref 37–54)
EGFRCR SERPLBLD CKD-EPI 2021: 104.4 ML/MIN/1.73
EOSINOPHIL # BLD AUTO: 0.1 10*3/MM3 (ref 0–0.4)
EOSINOPHIL NFR BLD AUTO: 1.9 % (ref 0.3–6.2)
ERYTHROCYTE [DISTWIDTH] IN BLOOD BY AUTOMATED COUNT: 13 % (ref 12.3–15.4)
GLOBULIN UR ELPH-MCNC: 2.7 GM/DL
GLUCOSE SERPL-MCNC: 145 MG/DL (ref 65–99)
GLUCOSE UR STRIP-MCNC: ABNORMAL MG/DL
HCT VFR BLD AUTO: 47.2 % (ref 37.5–51)
HGB BLD-MCNC: 16.4 G/DL (ref 13–17.7)
HGB UR QL STRIP.AUTO: NEGATIVE
INR PPP: 0.96 (ref 0.9–1.1)
KETONES UR QL STRIP: NEGATIVE
LEUKOCYTE ESTERASE UR QL STRIP.AUTO: NEGATIVE
LYMPHOCYTES # BLD AUTO: 1.48 10*3/MM3 (ref 0.7–3.1)
LYMPHOCYTES NFR BLD AUTO: 28.1 % (ref 19.6–45.3)
MCH RBC QN AUTO: 31.6 PG (ref 26.6–33)
MCHC RBC AUTO-ENTMCNC: 34.7 G/DL (ref 31.5–35.7)
MCV RBC AUTO: 90.9 FL (ref 79–97)
METHADONE UR QL SCN: NEGATIVE
MONOCYTES # BLD AUTO: 0.48 10*3/MM3 (ref 0.1–0.9)
MONOCYTES NFR BLD AUTO: 9.1 % (ref 5–12)
NEUTROPHILS NFR BLD AUTO: 3.16 10*3/MM3 (ref 1.7–7)
NEUTROPHILS NFR BLD AUTO: 59.9 % (ref 42.7–76)
NITRITE UR QL STRIP: NEGATIVE
OPIATES UR QL: NEGATIVE
OXYCODONE UR QL SCN: NEGATIVE
PCP UR QL SCN: NEGATIVE
PH UR STRIP.AUTO: 6 [PH] (ref 5–8)
PLATELET # BLD AUTO: 122 10*3/MM3 (ref 140–450)
PMV BLD AUTO: 11.1 FL (ref 6–12)
POTASSIUM SERPL-SCNC: 4.2 MMOL/L (ref 3.5–5.2)
PROPOXYPH UR QL: NEGATIVE
PROT SERPL-MCNC: 6.5 G/DL (ref 6–8.5)
PROT UR QL STRIP: NEGATIVE
PROTHROMBIN TIME: 13.1 SECONDS (ref 12.5–14.5)
RBC # BLD AUTO: 5.19 10*6/MM3 (ref 4.14–5.8)
SODIUM SERPL-SCNC: 140 MMOL/L (ref 136–145)
SP GR UR STRIP: >=1.03 (ref 1–1.03)
TRICYCLICS UR QL SCN: NEGATIVE
UROBILINOGEN UR QL STRIP: ABNORMAL
WBC NRBC COR # BLD: 5.27 10*3/MM3 (ref 3.4–10.8)

## 2022-07-21 PROCEDURE — 36415 COLL VENOUS BLD VENIPUNCTURE: CPT

## 2022-07-21 PROCEDURE — 81003 URINALYSIS AUTO W/O SCOPE: CPT

## 2022-07-21 PROCEDURE — 85610 PROTHROMBIN TIME: CPT

## 2022-07-21 PROCEDURE — 80053 COMPREHEN METABOLIC PANEL: CPT

## 2022-07-21 PROCEDURE — 80306 DRUG TEST PRSMV INSTRMNT: CPT

## 2022-07-21 PROCEDURE — 71046 X-RAY EXAM CHEST 2 VIEWS: CPT

## 2022-07-21 PROCEDURE — 85025 COMPLETE CBC W/AUTO DIFF WBC: CPT

## 2022-08-01 ENCOUNTER — LAB (OUTPATIENT)
Dept: LAB | Facility: HOSPITAL | Age: 52
End: 2022-08-01

## 2022-08-01 DIAGNOSIS — Z01.818 PRE-OPERATIVE CLEARANCE: ICD-10-CM

## 2022-08-01 LAB — SARS-COV-2 RNA PNL SPEC NAA+PROBE: NOT DETECTED

## 2022-08-01 PROCEDURE — C9803 HOPD COVID-19 SPEC COLLECT: HCPCS

## 2022-08-01 PROCEDURE — U0004 COV-19 TEST NON-CDC HGH THRU: HCPCS

## 2022-08-03 DIAGNOSIS — G47.33 OBSTRUCTIVE SLEEP APNEA: Primary | ICD-10-CM

## 2022-08-09 ENCOUNTER — APPOINTMENT (OUTPATIENT)
Dept: MRI IMAGING | Facility: HOSPITAL | Age: 52
End: 2022-08-09

## 2022-08-11 DIAGNOSIS — G47.33 OSA (OBSTRUCTIVE SLEEP APNEA): Primary | ICD-10-CM

## 2022-08-22 ENCOUNTER — HOSPITAL ENCOUNTER (OUTPATIENT)
Dept: ULTRASOUND IMAGING | Facility: HOSPITAL | Age: 52
End: 2022-08-22

## 2022-09-22 ENCOUNTER — HOSPITAL ENCOUNTER (OUTPATIENT)
Dept: ULTRASOUND IMAGING | Facility: HOSPITAL | Age: 52
Discharge: HOME | End: 2022-09-22
Admitting: INTERNAL MEDICINE

## 2022-09-22 DIAGNOSIS — K74.60 CIRRHOSIS OF LIVER WITHOUT ASCITES, UNSPECIFIED HEPATIC CIRRHOSIS TYPE: ICD-10-CM

## 2022-09-22 PROCEDURE — 76700 US EXAM ABDOM COMPLETE: CPT

## 2022-09-26 RX ORDER — PANTOPRAZOLE SODIUM 20 MG/1
TABLET, DELAYED RELEASE ORAL
Qty: 60 TABLET | Refills: 5 | Status: SHIPPED | OUTPATIENT
Start: 2022-09-26 | End: 2023-01-12

## 2022-10-31 PROCEDURE — 83993 ASSAY FOR CALPROTECTIN FECAL: CPT | Performed by: INTERNAL MEDICINE

## 2022-10-31 PROCEDURE — 82653 EL-1 FECAL QUANTITATIVE: CPT | Performed by: INTERNAL MEDICINE

## 2022-11-07 ENCOUNTER — TELEPHONE (OUTPATIENT)
Dept: GASTROENTEROLOGY | Facility: CLINIC | Age: 52
End: 2022-11-07

## 2022-11-07 NOTE — TELEPHONE ENCOUNTER
----- Message from Yaneth Bell MD sent at 11/5/2022 11:46 AM EDT -----      Let him know that his pancreatic test revealed deficiency in the pancreatic enzymes.   I have sent a Creon to take 3 times daily with meals.

## 2022-11-14 ENCOUNTER — OFFICE VISIT (OUTPATIENT)
Dept: GASTROENTEROLOGY | Facility: CLINIC | Age: 52
End: 2022-11-14

## 2022-11-14 VITALS
HEART RATE: 78 BPM | SYSTOLIC BLOOD PRESSURE: 161 MMHG | WEIGHT: 283 LBS | DIASTOLIC BLOOD PRESSURE: 94 MMHG | HEIGHT: 73 IN | BODY MASS INDEX: 37.51 KG/M2 | TEMPERATURE: 98.4 F | RESPIRATION RATE: 16 BRPM

## 2022-11-14 DIAGNOSIS — K31.89 PORTAL HYPERTENSIVE GASTROPATHY: ICD-10-CM

## 2022-11-14 DIAGNOSIS — K76.6 PORTAL HYPERTENSIVE GASTROPATHY: ICD-10-CM

## 2022-11-14 DIAGNOSIS — K52.9 CHRONIC DIARRHEA: ICD-10-CM

## 2022-11-14 DIAGNOSIS — K86.81 EXOCRINE PANCREATIC INSUFFICIENCY: ICD-10-CM

## 2022-11-14 DIAGNOSIS — K21.9 GASTROESOPHAGEAL REFLUX DISEASE WITHOUT ESOPHAGITIS: ICD-10-CM

## 2022-11-14 DIAGNOSIS — K74.60 CIRRHOSIS OF LIVER WITHOUT ASCITES, UNSPECIFIED HEPATIC CIRRHOSIS TYPE: Primary | ICD-10-CM

## 2022-11-14 PROCEDURE — 99214 OFFICE O/P EST MOD 30 MIN: CPT | Performed by: INTERNAL MEDICINE

## 2022-11-14 RX ORDER — DICYCLOMINE HCL 20 MG
20 TABLET ORAL 3 TIMES DAILY PRN
Qty: 60 TABLET | Refills: 2 | Status: SHIPPED | OUTPATIENT
Start: 2022-11-14

## 2022-11-14 RX ORDER — LISINOPRIL 20 MG/1
20 TABLET ORAL DAILY
COMMUNITY
Start: 2022-10-24

## 2022-11-14 NOTE — PROGRESS NOTES
Follow Up Note     Date: 2022   Patient Name: Kb Minaya  MRN: [id number]  : 1970     Referring Physician: Vilma Cassidy MD    Chief Complaint:    Chief Complaint   Patient presents with   • Follow-up   • Diarrhea   • Cirrhosis   • Heartburn       Interval History:   2022  Kb Minaya is a 52 y.o. male who is here today for follow up for cirrhosis, change in bowel habit. States that he still has a multiple loose stools.  He started Creon 3 days ago and he is seeing some difference but still has a bowel movement anywhere 5 to 7/day.  Denies any confusional episodes.        2022  Kb Minaya is a 52 y.o. male who is here today for follow up for his nausea, abdominal pain and diarrhea.  He states that he still has loose stool anywhere 2-3 per day.  As per patient cardio recommended him to stop the colestipol and he stopped it. He has been having intermittent abdominal pain mostly RUQ, RLQ and occasionally lower abdomen over 5 years. Associated nausea occasional vomiting. intermittently and loose stool, watery diarrhea. He will have anywhere loose stool 1-3 times per day. This patient deny any hematochezia or melena.  Weight is stable. No odynophagia or dysphagia. There is history of acid reflux on ppi now with decent control on symptoms. There is no history of anemia.  Last EGD was on 2021 by Dr. Diaz, gastroenterology revealed mild gastritis and intestinal metaplasia.  Esophagus reported as normal without any esophageal varices. Last colonoscopy was year ago at [facility], advanced polyos removed as per pt. Polyps removed and advised to repeat colon in one year.      He had a laparoscopic cholecystectomy done on 2021 for a biliary dyskinesia and gallbladder sludge noted with ultrasound done in 2021.  His pathology revealed chronic cholecystitis and focal cholesterolosis.  Laparoscopically liver was nodular suggesting cirrhosis.  Biopsies  "obtained revealed steatohepatitis grade 2 of the 3 and page 4 with a 4.  Patient is obese, hypertensive and diabetic indicating metabolic syndrome.  He had a last CT scan abdomen pelvis done in October 2020 which revealed signs of cirrhosis.  No family history of any liver cirrhosis.  No prior history of any chronic hepatitis.     No family history of colon cancer or any GI malignancy. Dad had colon Ca. No history of any abdominal surgery. Denies alcohol abuse or cigarette smoking.  He has a prior history of coronary artery disease and MI in 2020 and 2012.  He had a cardiac stent placed in 2006  He is on ASA/Plavix/ Pletal.     Subjective      Past Medical History:   Past Medical History:   Diagnosis Date   • Ankle fracture, left    • Anxiety    • Anxiety and depression    • Asthma    • Back pain    • Blood clot in vein 01/2021    right leg-states has resolved now   • Cirrhosis (HCC)    • CKD (chronic kidney disease)     pt denies 7/6/21   • Colon polyp    • Coronary artery disease    • Depression    • Diabetes mellitus (HCC)    • Esophageal varices (HCC) 003/01/2022   • Fatty liver    • GERD (gastroesophageal reflux disease)    • H/O echocardiogram 02/2021   • History of nuclear stress test 01/2020    \"I did ok with it\"   • Hyperlipidemia    • Hypertension    • Kidney stone    • Murmur    • Myocardial infarction (HCC)     MI IN 2006 (stent x 1), 2012 (no stents), 8/2/2020 (no stents with last MI).  Just saw Dr. Duenas recently for a f/u.  (assessed 11/22/21)   • Pilonidal cyst    • Plantar fasciitis     bilateral   • Seasonal allergies    • Sleep apnea     CPAP-\"I don't wear it every night\"   • Urinary frequency    • Wears glasses    • Wrist fracture, left      Past Surgical History:   Past Surgical History:   Procedure Laterality Date   • APPENDECTOMY     • CARDIAC CATHETERIZATION  2006    stent x 1   • CARDIAC CATHETERIZATION  2012    no stents   • CHOLECYSTECTOMY  08/23/2021   • COLONOSCOPY     • COLONOSCOPY N/A " 11/23/2021    Procedure: COLONOSCOPY with biopsies;  Surgeon: Yaneth Bell MD;  Location: [facility] ENDOSCOPY;  Service: Gastroenterology;  Laterality: N/A;   • CYST REMOVAL      lower part of spine.   • ENDOSCOPY     • ENDOSCOPY N/A 07/07/2021    Procedure: ESOPHAGOGASTRODUODENOSCOPY WITH BOPSIES;  Surgeon: Arjun Delgadillo MD;  Location: [facility] ENDOSCOPY;  Service: Gastroenterology;  Laterality: N/A;   • ENDOSCOPY W/ BANDING N/A 03/17/2022    Procedure: ESOPHAGOGASTRODUODENOSCOPY WITH Biopsy;  Surgeon: Yaneth Bell MD;  Location: [facility] ENDOSCOPY;  Service: Gastroenterology;  Laterality: N/A;   • LIVER BIOPSY  8/23/21   • PLANTAR FASCIA RELEASE      right   • ROTATOR CUFF REPAIR Right 08/03/2022   • SKIN BIOPSY      benign   • UPPER GASTROINTESTINAL ENDOSCOPY         Family History:   Family History   Problem Relation Age of Onset   • Diabetes Mother    • COPD Mother    • Heart failure Mother    • Heart disease Father    • Lung cancer Father    • Pancreatic cancer Paternal Aunt    • Colon cancer Paternal Aunt        Social History:   Social History     Socioeconomic History   • Marital status:    Tobacco Use   • Smoking status: Never   • Smokeless tobacco: Never   Vaping Use   • Vaping Use: Never used   Substance and Sexual Activity   • Alcohol use: Not Currently   • Drug use: No   • Sexual activity: Defer       Medications:     Current Outpatient Medications:   •  budesonide-formoterol (Symbicort) 160-4.5 MCG/ACT inhaler, Inhale 2 puffs 2 (Two) Times a Day. Rinse mouth with water after use., Disp: 1 each, Rfl: 5  •  carvedilol (COREG) 6.25 MG tablet, Take 6.25 mg by mouth 2 (Two) Times a Day With Meals. Last Wednesday - patient out of medication., Disp: , Rfl:   •  Continuous Blood Gluc Transmit (Dexcom G6 Transmitter) misc, USE AS DIRECTED AND REPLACE AFTER 90 DAYS, Disp: , Rfl:   •  dicyclomine (BENTYL) 20 MG tablet, Take 1 tablet by mouth 3 (Three) Times a Day As Needed (abdominal  pain)., Disp: 60 tablet, Rfl: 1  •  glyburide (DIAbeta) 5 MG tablet, Take 10 mg by mouth 2 (Two) Times a Day With Meals., Disp: , Rfl:   •  hydrOXYzine (ATARAX) 10 MG tablet, Take 10 mg by mouth 2 (Two) Times a Day As Needed. for anxiety, Disp: , Rfl:   •  Jardiance 25 MG tablet tablet, TAKE 1 TABLET BY MOUTH EVERY DAY FOR BLOOD SUGAR, Disp: , Rfl:   •  lisinopril (PRINIVIL,ZESTRIL) 20 MG tablet, Take 1 tablet by mouth Daily., Disp: , Rfl:   •  Pancrelipase, Lip-Prot-Amyl, (CREON) 75634-016170 units capsule delayed-release particles capsule, Take 1 capsule by mouth 3 (Three) Times a Day With Meals., Disp: 270 capsule, Rfl: 1  •  pantoprazole (PROTONIX) 20 MG EC tablet, TAKE 1 TABLET BY MOUTH 2 TIMES A DAY, Disp: 60 tablet, Rfl: 5  •  Semaglutide,0.25 or 0.5MG/DOS, (Ozempic, 0.25 or 0.5 MG/DOSE,) 2 MG/1.5ML solution pen-injector, Inject 0.5 mg under the skin into the appropriate area as directed 1 (One) Time Per Week., Disp: , Rfl:   •  Ventolin  (90 Base) MCG/ACT inhaler, Inhale 1-2 puffs As Needed. every 4 to 6 hours, Disp: , Rfl:   •  vitamin E 400 UNIT capsule, Take 400 Units by mouth 2 (Two) Times a Day., Disp: , Rfl:     Allergies:   Allergies   Allergen Reactions   • Morphine And Related Anaphylaxis   • Simvastatin Other (See Comments)     Liver problems   • Penicillins Hives   • Pradaxa [Dabigatran Etexilate Mesylate] Hives       Review of Systems:   Review of Systems   Constitutional: Positive for appetite change, fatigue and unexpected weight loss. Negative for fever.   HENT: Negative for trouble swallowing.    Gastrointestinal: Positive for abdominal distention, abdominal pain, diarrhea, nausea, rectal pain, GERD and indigestion. Negative for anal bleeding, blood in stool, constipation and vomiting.       The following portions of the patient's history were reviewed and updated as appropriate: allergies, current medications, past family history, past medical history, past social history, past  "surgical history and problem list.    Objective     Physical Exam:  Vital Signs:   Vitals:    11/14/22 1413   BP: 161/94   Pulse: 78   Resp: 16   Temp: 98.4 °F (36.9 °C)   TempSrc: Infrared   Weight: 128 kg (283 lb)   Height: 185.4 cm (73\")       Physical Exam  Constitutional:       Appearance: He is obese.   HENT:      Head: Normocephalic and atraumatic.   Eyes:      Conjunctiva/sclera: Conjunctivae normal.   Abdominal:      General: Abdomen is flat. There is no distension.      Palpations: There is no mass.      Tenderness: There is no abdominal tenderness. There is no guarding or rebound.      Hernia: No hernia is present.   Musculoskeletal:      Cervical back: Normal range of motion and neck supple.   Neurological:      Mental Status: He is alert.         Results Review:   I reviewed the patient's new clinical results.    No visits with results within 90 Day(s) from this visit.   Latest known visit with results is:   Lab on 08/01/2022   Component Date Value Ref Range Status   • COVID19 08/01/2022 Not Detected  Not Detected - Ref. Range Final      US Abdomen Complete    Result Date: 9/23/2022  1. Findings of cirrhosis. 2. Hyperechoic mass of the liver measuring up to 18 mm similar from prior exam favor hemangioma. Longer term imaging surveillance recommended in 6-12 months.  This report was signed and finalized on 9/23/2022 8:34 AM by Elmo Valle MD.      EGD  3/17/2022  - Normal oropharynx.  - Z-line regular, 40 cm from the incisors.  - Grade I esophageal varices.  - Erosive gastropathy with stigmata of recent bleeding. Biopsied.  - Mild Portal hypertensive gastropathy.  - Normal duodenal bulb, first portion of the duodenum, second portion of the duodenum and third portion of the  duodenum. Biopsied.  - Likely had coffee ground from erosions     Path; Duodenum normal, Gatsric  h pylori -ve, reactive gastropathy    Assessment / Plan      1.  Nausea   2.  Chronic diarrhea  3.  Exocrine pancreatic " insufficiency  Suspected IBS with diarrhea/diabetic diarrhea  His abdominal symptoms multifactorial in etiology including diabetes mellitus, gallbladder removal and EPI with a functional component.    11/14/2022  He states that changing her diet did not change his symptoms.  He started seeing some difference with the Creon.  His recent fecal calprotectin was normal however fecal elastase was low at 53 suggesting severe exocrine pancreatic insufficiency.  No prior history of alcohol abuse except social drinking while he was a teenager.  Recent MRI scan abdomen and CT abdomen did not reveal any pancreatic pathology. Recent EGD done on 3/17/2022 did not reveal any signs of celiac and duodenal biopsies were negative.    We will continue with the Creon 36,001 capsule 3 times daily with the meals.  We will titrate the dose after 2 to 3 months  Advised to take Imodium half tablets 1 tablets p.o. daily and titrate the dose to a bowel movement less than 3/day.  Labs, alpha-fetoprotein before the next visit.  Will continue dicyclomine as before.    4. Compensated NEW cirrhosis of liver without ascites, unspecified hepatic cirrhosis type (CMS/HCC); MELD; 7 (8/2021), MELD ; 6 (3/2022)  5.  Portal hypertensive gastropathy  6.  Grade 1 esophageal varices  11/14/2022  He had a recent ultrasound done in September 2022 which revealed again a cirrhosis and 18 mm possible hemangioma.  No significant change compared to prior MRI scan abdomen earlier this year in March 2022.  His alpha-fetoprotein level was normal 4.4 in March 2022.  His lab studies done in July 2022 reviewed. EGD done on 3/17/2022 revealed grade 1 esophageal varices and mild portal hypertensive gastropathy.    We will continue his carvedilol as before.  Ultrasound abdomen March 2023 for HCC surveillance  Needs a surveillance EGD in 2 years time in May 2024    12/1/2021  Unfortunately instead of losing weight patient gained about 5 pounds since the last visit.  He is  not immune to hepatitis a and B hep C antibody is negative.  BRENDA, anti-smooth muscle antibody, antimitochondrial antibody negative.  Iron studies studies reveal a normal iron saturation ruling out hemochromatosis.  Alpha-1 antitrypsin levels were normal.  His New FibroSure test done revealed F1-F2 fibrosis, N1 steatohepatitis, S3 steatosis which is not consistent with the radiologic findings and the liver biopsy findings.  However patient lab studies, CT scan findings and liver biopsy are more in favor of cirrhosis  Advised to take a combined hep a and B vaccine at PCPs office or health center.  Counseled on avoid gaining any further weight.  8/30/2021  Patient history, prior work-up, clinical finding is not more in favor of compensated liver cirrhosis secondary to NEW without ascites. He had a elevated liver enzymes over 8 years now.  Last blood work on record was in 2014 with elevated liver enzymes and thrombocytopenia.  His recent EGD done in July 2021 by Dr. Diaz did not reveal any esophageal varices.  Ultrasound abdomen in July 2021 did not reveal any liver lesions.  Liver biopsy done during the laparoscopic cholecystectomy revealed a grade 2 through 3 steatohepatitis with the stage 4 fibrosis suggesting cirrhosis.  He is nonalcoholic and non-smoker.     7. Gastroesophageal reflux disease without esophagitis  He tried to cut down the Protonix dose to once daily however he started getting significant issues with reflux symptoms and went back to Protonix 20 mg p.o. twice daily with good symptom control we will continue the same    Prior history  8. Personal history of colonic polyps  Colonoscopy done on 11/23/2021 did not reveal any colon polyps.  No endoscopic signs of any colitis colonic biopsies and terminal ileum biopsies were normal. Due to his advanced polyp removed in the past will repeat colonoscopy in 5 years time in 2026   As per patient he had a colonoscopy done a year ago at [facility].  He had 5  polyps removed one of them was large and was advised to repeat colonoscopy in 1 year time.         Follow Up:   No follow-ups on file.    Yaneth Bell MD  [facility]  11/14/2022  14:15 EST     Please note that portions of this note may have been completed with a voice recognition program.

## 2023-01-12 RX ORDER — PANTOPRAZOLE SODIUM 20 MG/1
TABLET, DELAYED RELEASE ORAL
Qty: 60 TABLET | Refills: 5 | Status: SHIPPED | OUTPATIENT
Start: 2023-01-12

## 2023-03-08 ENCOUNTER — OFFICE VISIT (OUTPATIENT)
Dept: PULMONOLOGY | Facility: CLINIC | Age: 53
End: 2023-03-08
Payer: MEDICAID

## 2023-03-08 ENCOUNTER — LAB (OUTPATIENT)
Dept: LAB | Facility: HOSPITAL | Age: 53
End: 2023-03-08
Payer: MEDICAID

## 2023-03-08 VITALS
HEART RATE: 74 BPM | BODY MASS INDEX: 38.3 KG/M2 | OXYGEN SATURATION: 97 % | HEIGHT: 73 IN | WEIGHT: 289 LBS | RESPIRATION RATE: 16 BRPM | DIASTOLIC BLOOD PRESSURE: 84 MMHG | SYSTOLIC BLOOD PRESSURE: 146 MMHG

## 2023-03-08 DIAGNOSIS — G47.33 OSA (OBSTRUCTIVE SLEEP APNEA): Primary | ICD-10-CM

## 2023-03-08 DIAGNOSIS — K52.9 CHRONIC DIARRHEA: ICD-10-CM

## 2023-03-08 DIAGNOSIS — J45.40 MODERATE PERSISTENT ASTHMA WITHOUT COMPLICATION: ICD-10-CM

## 2023-03-08 DIAGNOSIS — K74.60 CIRRHOSIS OF LIVER WITHOUT ASCITES, UNSPECIFIED HEPATIC CIRRHOSIS TYPE: ICD-10-CM

## 2023-03-08 DIAGNOSIS — J30.9 ALLERGIC RHINITIS, UNSPECIFIED SEASONALITY, UNSPECIFIED TRIGGER: ICD-10-CM

## 2023-03-08 DIAGNOSIS — K86.81 EXOCRINE PANCREATIC INSUFFICIENCY: ICD-10-CM

## 2023-03-08 DIAGNOSIS — E66.9 OBESITY (BMI 30-39.9): ICD-10-CM

## 2023-03-08 DIAGNOSIS — R06.02 SHORTNESS OF BREATH: ICD-10-CM

## 2023-03-08 LAB
INR PPP: 1.05 (ref 0.9–1.1)
PROTHROMBIN TIME: 14 SECONDS (ref 12.5–14.5)

## 2023-03-08 PROCEDURE — 99214 OFFICE O/P EST MOD 30 MIN: CPT | Performed by: NURSE PRACTITIONER

## 2023-03-08 PROCEDURE — 82785 ASSAY OF IGE: CPT

## 2023-03-08 PROCEDURE — 94726 PLETHYSMOGRAPHY LUNG VOLUMES: CPT | Performed by: INTERNAL MEDICINE

## 2023-03-08 PROCEDURE — 86003 ALLG SPEC IGE CRUDE XTRC EA: CPT

## 2023-03-08 PROCEDURE — 80053 COMPREHEN METABOLIC PANEL: CPT

## 2023-03-08 PROCEDURE — 85610 PROTHROMBIN TIME: CPT

## 2023-03-08 PROCEDURE — 82105 ALPHA-FETOPROTEIN SERUM: CPT

## 2023-03-08 PROCEDURE — 36415 COLL VENOUS BLD VENIPUNCTURE: CPT

## 2023-03-08 PROCEDURE — 94729 DIFFUSING CAPACITY: CPT | Performed by: INTERNAL MEDICINE

## 2023-03-08 PROCEDURE — 94060 EVALUATION OF WHEEZING: CPT | Performed by: INTERNAL MEDICINE

## 2023-03-08 PROCEDURE — 85025 COMPLETE CBC W/AUTO DIFF WBC: CPT

## 2023-03-08 RX ORDER — BUPROPION HYDROCHLORIDE 150 MG/1
TABLET, EXTENDED RELEASE ORAL
COMMUNITY
Start: 2023-02-08

## 2023-03-08 RX ORDER — PANTOPRAZOLE SODIUM 20 MG/1
20 TABLET, DELAYED RELEASE ORAL
COMMUNITY
End: 2023-03-08

## 2023-03-08 RX ORDER — PROCHLORPERAZINE 25 MG/1
SUPPOSITORY RECTAL
COMMUNITY
Start: 2023-01-12

## 2023-03-08 RX ORDER — ALBUTEROL SULFATE 90 UG/1
2 AEROSOL, METERED RESPIRATORY (INHALATION) EVERY 6 HOURS PRN
Qty: 18 G | Refills: 3 | Status: SHIPPED | OUTPATIENT
Start: 2023-03-08

## 2023-03-08 RX ORDER — NITROGLYCERIN 0.4 MG/1
0.4 TABLET SUBLINGUAL
COMMUNITY

## 2023-03-08 NOTE — PROGRESS NOTES
"Chief Complaint   Patient presents with   • Shortness of Breath   • Follow-up         Subjective   Kb Minaya is a 53 y.o. male.     History of Present Illness   Patient comes back today for follow up of Obstructive Sleep apnea and SOB.     Patient says that he is compliant with his device and using it regularly.    Patient's symptoms of sleep disturbance and daytime sleepiness have been helped greatly with the use of PAP device, as prescribed. He has received a new machine. He is able to sleep a little longer and has a little more energy when he wakes.     Patient says that since the last office visit he has had no recent exacerbations. he denies any emergency room visits or hospitalizations, due to his asthma.     The patient says that he is compliant with pulmonary medicines, as prescribed. He uses Symbicort twice a day.     He does not have GORDY but could definitely use one in Spring/Summer months.     He has allergy issues. He does not take an allergy medication. He c/o sneezing, watery eyes, coughing when allergies are acting up. This usually happens June-October. As the pollen count increases, he has increased SOB.       The following portions of the patient's history were reviewed and updated as appropriate: allergies, current medications, past family history, past medical history, past social history and past surgical history.    Review of Systems   HENT: Negative for sinus pressure, sneezing and sore throat.    Respiratory: Negative for cough, chest tightness, shortness of breath and wheezing.        Objective   Visit Vitals  /84   Pulse 74   Resp 16   Ht 185.4 cm (73\") Comment: pt reported   Wt 131 kg (289 lb)   SpO2 97%   BMI 38.13 kg/m²         Physical Exam  Vitals reviewed.   HENT:      Head: Atraumatic.      Mouth/Throat:      Mouth: Mucous membranes are moist.      Comments: Crowded oropharynx.   Eyes:      Extraocular Movements: Extraocular movements intact.   Cardiovascular:      Rate " and Rhythm: Normal rate and regular rhythm.   Pulmonary:      Effort: Pulmonary effort is normal. No respiratory distress.      Breath sounds: Normal breath sounds. No wheezing.   Abdominal:      Comments: Obese abdomen.   Musculoskeletal:      Comments: Gait normal.   Skin:     General: Skin is warm.   Neurological:      Mental Status: He is alert and oriented to person, place, and time.           Assessment & Plan   Diagnoses and all orders for this visit:    1. ANGELICA (obstructive sleep apnea) (Primary)    2. Moderate persistent asthma without complication  -     Allergens, Zone 8; Future  -     IgE; Future    3. Obesity (BMI 30-39.9)    4. Allergic rhinitis, unspecified seasonality, unspecified trigger  -     Allergens, Zone 8; Future  -     IgE; Future    Other orders  -     albuterol sulfate HFA (Ventolin HFA) 108 (90 Base) MCG/ACT inhaler; Inhale 2 puffs Every 6 (Six) Hours As Needed for Wheezing or Shortness of Air.  Dispense: 18 g; Refill: 3           Return in about 6 months (around 9/8/2023) for Recheck, For Dr. Lobato, labs.    DISCUSSION (if any):  PFT today consistent with no obstruction. No restriction or air trapping suggested. Preserved diffusion.  I have discussed these results with the patient today    Absolute eosinophil 100 7/2022.    His symptoms of asthma are under adequate control at this time. He should continue to use symbicort twice a day.     I have prescribed albuterol to use as needed.     I have ordered IgE and RAST.     He has liver and pancreatic issues and is working with his PCP to find an antihistamine that he can take. Singulair would be one option for the patient if it is not contraindicated. Xyzal or equalivent would be an antihistamine option if not contraindicated.     Patient's medications for underlying asthma were reviewed with him in great detail.    Any needed adjustments to his pulmonary medications, either for clinical or insurance coverage reasons, have been made and are  reflected in the orders.    Side effects of prescribed medications discussed with the patient.    Asthma action plan with discussed with him.    The patient was asked to call this office if the symptoms worsen.    I reviewed his sleep study from 2014 which was consistent with severe ANGELICA with AHI 31 per hour. AHI worse in REM at 78 per hour.     Titration study 2014 with good response at CPAP 11 cm.     Continue treatment with PAP therapy at the current pressure.     He has received a new machine.     I will ask staff to request compliance from DME.     Humidification setup, hose and mask care discussed.    Weight loss advised.    Use every night for at least 4 hours stressed.      Dictated utilizing Dragon dictation.    This document was electronically signed by DONALD Leyva March 8, 2023  12:05 EST

## 2023-03-09 LAB
ALBUMIN SERPL-MCNC: 3.8 G/DL (ref 3.5–5.2)
ALBUMIN/GLOB SERPL: 1.3 G/DL
ALP SERPL-CCNC: 106 U/L (ref 39–117)
ALPHA-FETOPROTEIN: 2.1 NG/ML (ref 0–8.3)
ALT SERPL W P-5'-P-CCNC: 49 U/L (ref 1–41)
ANION GAP SERPL CALCULATED.3IONS-SCNC: 9.4 MMOL/L (ref 5–15)
AST SERPL-CCNC: 35 U/L (ref 1–40)
BASOPHILS # BLD AUTO: 0.04 10*3/MM3 (ref 0–0.2)
BASOPHILS NFR BLD AUTO: 0.8 % (ref 0–1.5)
BILIRUB SERPL-MCNC: 0.7 MG/DL (ref 0–1.2)
BUN SERPL-MCNC: 10 MG/DL (ref 6–20)
BUN/CREAT SERPL: 12.5 (ref 7–25)
CALCIUM SPEC-SCNC: 9 MG/DL (ref 8.6–10.5)
CHLORIDE SERPL-SCNC: 103 MMOL/L (ref 98–107)
CO2 SERPL-SCNC: 24.6 MMOL/L (ref 22–29)
CREAT SERPL-MCNC: 0.8 MG/DL (ref 0.76–1.27)
DEPRECATED RDW RBC AUTO: 42 FL (ref 37–54)
EGFRCR SERPLBLD CKD-EPI 2021: 104.5 ML/MIN/1.73
EOSINOPHIL # BLD AUTO: 0.06 10*3/MM3 (ref 0–0.4)
EOSINOPHIL NFR BLD AUTO: 1.3 % (ref 0.3–6.2)
ERYTHROCYTE [DISTWIDTH] IN BLOOD BY AUTOMATED COUNT: 13.1 % (ref 12.3–15.4)
GLOBULIN UR ELPH-MCNC: 2.9 GM/DL
GLUCOSE SERPL-MCNC: 225 MG/DL (ref 65–99)
HCT VFR BLD AUTO: 48 % (ref 37.5–51)
HGB BLD-MCNC: 16.2 G/DL (ref 13–17.7)
LYMPHOCYTES # BLD AUTO: 1.24 10*3/MM3 (ref 0.7–3.1)
LYMPHOCYTES NFR BLD AUTO: 25.9 % (ref 19.6–45.3)
MCH RBC QN AUTO: 30.1 PG (ref 26.6–33)
MCHC RBC AUTO-ENTMCNC: 33.8 G/DL (ref 31.5–35.7)
MCV RBC AUTO: 89.1 FL (ref 79–97)
MONOCYTES # BLD AUTO: 0.35 10*3/MM3 (ref 0.1–0.9)
MONOCYTES NFR BLD AUTO: 7.3 % (ref 5–12)
NEUTROPHILS NFR BLD AUTO: 3.09 10*3/MM3 (ref 1.7–7)
NEUTROPHILS NFR BLD AUTO: 64.5 % (ref 42.7–76)
PLATELET # BLD AUTO: 115 10*3/MM3 (ref 140–450)
PMV BLD AUTO: 11.8 FL (ref 6–12)
POTASSIUM SERPL-SCNC: 4.1 MMOL/L (ref 3.5–5.2)
PROT SERPL-MCNC: 6.7 G/DL (ref 6–8.5)
RBC # BLD AUTO: 5.39 10*6/MM3 (ref 4.14–5.8)
SODIUM SERPL-SCNC: 137 MMOL/L (ref 136–145)
WBC NRBC COR # BLD: 4.79 10*3/MM3 (ref 3.4–10.8)

## 2023-03-11 LAB
A ALTERNATA IGE QN: <0.1 KU/L
A FUMIGATUS IGE QN: <0.1 KU/L
AMER ROACH IGE QN: <0.1 KU/L
BAHIA GRASS IGE QN: <0.1 KU/L
BERMUDA GRASS IGE QN: <0.1 KU/L
BOXELDER IGE QN: <0.1 KU/L
C HERBARUM IGE QN: <0.1 KU/L
CAT DANDER IGE QN: <0.1 KU/L
CMN PIGWEED IGE QN: <0.1 KU/L
COMMON RAGWEED IGE QN: <0.1 KU/L
CONV CLASS DESCRIPTION: NORMAL
D FARINAE IGE QN: <0.1 KU/L
D PTERONYSS IGE QN: <0.1 KU/L
DOG DANDER IGE QN: <0.1 KU/L
ENGL PLANTAIN IGE QN: <0.1 KU/L
HAZELNUT POLN IGE QN: <0.1 KU/L
JOHNSON GRASS IGE QN: <0.1 KU/L
KENT BLUE GRASS IGE QN: <0.1 KU/L
LONDON PLANE IGE QN: <0.1 KU/L
M RACEMOSUS IGE QN: <0.1 KU/L
MT JUNIPER IGE QN: <0.1 KU/L
MUGWORT IGE QN: <0.1 KU/L
NETTLE IGE QN: <0.1 KU/L
P NOTATUM IGE QN: <0.1 KU/L
S BOTRYOSUM IGE QN: <0.1 KU/L
SHEEP SORREL IGE QN: <0.1 KU/L
SWEET GUM IGE QN: <0.1 KU/L
WHITE ELM IGE QN: <0.1 KU/L
WHITE HICKORY IGE QN: <0.1 KU/L
WHITE MULBERRY IGE QN: <0.1 KU/L
WHITE OAK IGE QN: <0.1 KU/L

## 2023-03-12 LAB — IGE SERPL-ACNC: 2 IU/ML (ref 6–495)

## 2023-05-10 RX ORDER — PANCRELIPASE 36000; 180000; 114000 [USP'U]/1; [USP'U]/1; [USP'U]/1
CAPSULE, DELAYED RELEASE PELLETS ORAL
Qty: 270 CAPSULE | Refills: 1 | Status: SHIPPED | OUTPATIENT
Start: 2023-05-10

## 2023-07-26 ENCOUNTER — TELEPHONE (OUTPATIENT)
Dept: GASTROENTEROLOGY | Facility: CLINIC | Age: 53
End: 2023-07-26
Payer: MEDICARE

## 2023-09-11 ENCOUNTER — OFFICE VISIT (OUTPATIENT)
Dept: PULMONOLOGY | Facility: CLINIC | Age: 53
End: 2023-09-11
Payer: MEDICARE

## 2023-09-11 VITALS
DIASTOLIC BLOOD PRESSURE: 82 MMHG | HEIGHT: 73 IN | BODY MASS INDEX: 37.37 KG/M2 | SYSTOLIC BLOOD PRESSURE: 130 MMHG | OXYGEN SATURATION: 97 % | HEART RATE: 74 BPM | WEIGHT: 282 LBS

## 2023-09-11 DIAGNOSIS — G47.33 OSA (OBSTRUCTIVE SLEEP APNEA): ICD-10-CM

## 2023-09-11 DIAGNOSIS — E66.9 OBESITY (BMI 30-39.9): ICD-10-CM

## 2023-09-11 DIAGNOSIS — J45.40 MODERATE PERSISTENT ASTHMA WITHOUT COMPLICATION: Primary | ICD-10-CM

## 2023-09-11 PROCEDURE — 99214 OFFICE O/P EST MOD 30 MIN: CPT | Performed by: INTERNAL MEDICINE

## 2023-09-11 RX ORDER — SEMAGLUTIDE 0.68 MG/ML
INJECTION, SOLUTION SUBCUTANEOUS
COMMUNITY
Start: 2023-06-27

## 2023-09-11 RX ORDER — PROCHLORPERAZINE 25 MG/1
SUPPOSITORY RECTAL
COMMUNITY
Start: 2023-08-22

## 2023-09-11 NOTE — PROGRESS NOTES
"  Chief Complaint   Patient presents with    Follow-up    Sleeping Problem     Patient states his CPAP chokes him and his face is wet from moisture- patient has not worn for last 4-5 months         Subjective   Kb Minaya is a 53 y.o. male.   The patient has not used CPAP for the past few months and is having issues with snoring, daytime sleepiness and fatigue.    Patient does report improvement when he was able to use the CPAP for about 5 years but then had CoVid and since then he has been unable to use the CPAP as it \"albertina\" him.    Patient denies any issues with his mask.     Patient does not report any recent exacerbations requiring emergency room visits or hospitalizations.     Patient is compliant with pulmonary medicines, as prescribed.     he is currently on Symbicort \"as needed\". he is using the rescue inhalers minimally.     The following portions of the patient's history were reviewed and updated as appropriate: allergies, current medications, past family history, past medical history, past social history, and past surgical history.    Review of Systems   Constitutional:  Negative for fever and unexpected weight change.   HENT:  Negative for trouble swallowing.    Respiratory:  Positive for apnea and cough.    Psychiatric/Behavioral:  Positive for sleep disturbance.      Objective   Visit Vitals  /82   Pulse 74   Ht 185.4 cm (73\")   Wt 128 kg (282 lb)   SpO2 97%   BMI 37.21 kg/m²     BMI Readings from Last 3 Encounters:   09/11/23 37.21 kg/m²   03/08/23 38.13 kg/m²   11/14/22 37.34 kg/m²       Physical Exam  Vitals reviewed.   Constitutional:       Appearance: He is well-developed.   HENT:      Head: Atraumatic.   Cardiovascular:      Rate and Rhythm: Normal rate and regular rhythm.      Heart sounds: Normal heart sounds. No murmur heard.  Pulmonary:      Effort: Pulmonary effort is normal. No respiratory distress.      Breath sounds: Normal breath sounds. No wheezing or rales. "   Neurological:      Mental Status: He is alert and oriented to person, place, and time.           Assessment & Plan   Diagnoses and all orders for this visit:    1. Moderate persistent asthma without complication (Primary)    2. ANGELICA (obstructive sleep apnea)  -     Polysomnography 4 or More Parameters; Future    3. Obesity (BMI 30-39.9)           Return in about 6 months (around 3/11/2024) for SleepONLY/Wanda.      DISCUSSION (if any):  Last CT scan results was reviewed in great detail with the patient.  Results for orders placed during the hospital encounter of 02/20/22    CT Chest Pulmonary Embolism    Narrative  FINAL REPORT    TECHNIQUE:  Multiple axial CT images were obtained through the chest  following IV contrast using a CTA/PE protocol.  3D/MIP  reconstruction images were also performed. This study was  performed with techniques to keep radiation doses as low as  reasonably achievable (ALARA). Individualized dose reduction  techniques using automated exposure control or adjustment of mA  and/or kV according to the patient's size were employed.    CLINICAL HISTORY:  Hemoptysis/hematemesis, fall, right side pain    FINDINGS:  PAs and aorta: No pulmonary embolus.  Thoracic aorta is  unremarkable.  No significant coronary artery calcifications.  Heart/mediastinum: No evidence for right heart strain.  No  pericardial effusion.  The heart is normal in size.  Lungs: Mild  atelectasis.  Otherwise the lungs are clear.  Lymph nodes: No  pathologically enlarged thoracic lymph nodes.  Pleura: No  pneumothorax or pleural effusion.  Chest Wall: No chest wall  contusion.  Bones: No acute fracture.    Impression  No pulmonary embolus.  No acute findings in the chest.    Authenticated by Johnathan Parker MD on 02/20/2022 10:23:04 PM      Laboratory data was reviewed with him.   Lab Results   Component Value Date     09/08/2021     It appears that his symptoms of asthma are under adequate control with the current  "regimen.    Patient's medications for underlying asthma were reviewed in great detail.    I told him that Symbicort has been studied for \"as needed\" use recently in 2 [facility] trials and appears to be a safe option for a select group of patients.    Compliance with medications stressed.     Side effects of prescribed medications discussed with the patient.    The need to continue to be aware of triggers that may cause asthma exacerbation versus progression of disease, was also discussed.    I have discussed asthma action plan with him.    The patient was asked to call this office if the symptoms worsen.    I told the patient that his symptoms are consistent with poorly controlled sleep apnea.    Since the patient is having significant issues, the only realistic option is for him to undergo a split night study.    Patient was advised to continue using PAP for at least 4 hours every night.    Patient was advised to call this office with any issues.      Dictated utilizing Dragon dictation.    This document was electronically signed by Jonathan Lobato MD on 09/11/23 at 11:41 EDT  "

## 2024-04-08 ENCOUNTER — OFFICE VISIT (OUTPATIENT)
Dept: SURGERY | Facility: CLINIC | Age: 54
End: 2024-04-08
Payer: MEDICARE

## 2024-04-08 VITALS
HEIGHT: 73 IN | DIASTOLIC BLOOD PRESSURE: 98 MMHG | HEART RATE: 74 BPM | SYSTOLIC BLOOD PRESSURE: 160 MMHG | OXYGEN SATURATION: 96 % | BODY MASS INDEX: 37.77 KG/M2 | WEIGHT: 285 LBS | TEMPERATURE: 97.5 F

## 2024-04-08 DIAGNOSIS — K52.9 CHRONIC DIARRHEA: Primary | ICD-10-CM

## 2024-04-08 DIAGNOSIS — D17.21 LIPOMA OF RIGHT UPPER EXTREMITY: ICD-10-CM

## 2024-04-08 PROCEDURE — 1159F MED LIST DOCD IN RCRD: CPT | Performed by: SURGERY

## 2024-04-08 PROCEDURE — 1160F RVW MEDS BY RX/DR IN RCRD: CPT | Performed by: SURGERY

## 2024-04-08 PROCEDURE — 99213 OFFICE O/P EST LOW 20 MIN: CPT | Performed by: SURGERY

## 2024-04-08 RX ORDER — PRASUGREL 10 MG/1
10 TABLET, FILM COATED ORAL DAILY
COMMUNITY

## 2024-04-08 RX ORDER — DULAGLUTIDE 1.5 MG/.5ML
1.5 INJECTION, SOLUTION SUBCUTANEOUS
COMMUNITY
Start: 2024-04-01

## 2024-04-08 NOTE — PROGRESS NOTES
Patient: Kb Minaya    YOB: 1970    Date: 04/08/2024    Primary Care Provider: Vilma Cassidy MD    Chief Complaint   Patient presents with    Skin Lesion     Arm       SUBJECTIVE:    History of present illness:  Patient has a history significant for CAD, ANGELICA and MI.  I saw the patient in the office today as a consultation for a colonoscopy.  Patient's last colonoscopy was performed by Dr. Bell 11/23/2021 at which time he had biopsies colonic mucosa with small lymphoid aggregates and lamina propria edema.    He does complain of diarrhea and has several bowel movements every day, this has stayed fairly constant since his previous laparoscopic cholecystectomy years ago.  He also complains of painful nodules palpable in the right tricep and right forearm regions as well as the left upper extremity.    The following portions of the patient's history were reviewed and updated as appropriate: allergies, current medications, past family history, past medical history, past social history, past surgical history and problem list.    Review of Systems   Constitutional:  Negative for chills, fever and unexpected weight change.   HENT:  Negative for trouble swallowing and voice change.    Eyes:  Negative for visual disturbance.   Respiratory:  Negative for apnea, cough, chest tightness, shortness of breath and wheezing.    Cardiovascular:  Negative for chest pain, palpitations and leg swelling.   Gastrointestinal:  Negative for abdominal distention, abdominal pain, anal bleeding, blood in stool, constipation, diarrhea, nausea, rectal pain and vomiting.   Endocrine: Negative for cold intolerance and heat intolerance.   Genitourinary:  Negative for difficulty urinating, dysuria, flank pain, scrotal swelling and testicular pain.   Musculoskeletal:  Negative for back pain, gait problem and joint swelling.   Skin:  Negative for color change, rash and wound.   Neurological:  Negative for dizziness,  "syncope, speech difficulty, weakness, numbness and headaches.   Hematological:  Negative for adenopathy. Does not bruise/bleed easily.   Psychiatric/Behavioral:  Negative for confusion. The patient is not nervous/anxious.        History:  Past Medical History:   Diagnosis Date    Ankle fracture, left     Anxiety     Anxiety and depression     Asthma     Back pain     Blood clot in vein 01/2021    right leg-states has resolved now    Cirrhosis     CKD (chronic kidney disease)     pt denies 7/6/21    Colon polyp     Coronary artery disease     Depression     Diabetes mellitus     Esophageal varices 003/01/2022    Fatty liver     GERD (gastroesophageal reflux disease)     H/O echocardiogram 02/2021    History of nuclear stress test 01/2020    \"I did ok with it\"    Hyperlipidemia     Hypertension     Kidney stone     Murmur     Myocardial infarction     MI IN 2006 (stent x 1), 2012 (no stents), 8/2/2020 (no stents with last MI).  Just saw Dr. Duenas recently for a f/u.  (assessed 11/22/21)    Pilonidal cyst     Plantar fasciitis     bilateral    Seasonal allergies     Sleep apnea     CPAP-\"I don't wear it every night\"    Urinary frequency     Wears glasses     Wrist fracture, left        Past Surgical History:   Procedure Laterality Date    APPENDECTOMY      CARDIAC CATHETERIZATION  2006    stent x 1    CARDIAC CATHETERIZATION  2012    no stents    CHOLECYSTECTOMY  08/23/2021    COLONOSCOPY      COLONOSCOPY N/A 11/23/2021    Procedure: COLONOSCOPY with biopsies;  Surgeon: Yaneth Bell MD;  Location: [facility] ENDOSCOPY;  Service: Gastroenterology;  Laterality: N/A;    CYST REMOVAL      lower part of spine.    ENDOSCOPY      ENDOSCOPY N/A 07/07/2021    Procedure: ESOPHAGOGASTRODUODENOSCOPY WITH BOPSIES;  Surgeon: Arjun Delgadillo MD;  Location: [facility] ENDOSCOPY;  Service: Gastroenterology;  Laterality: N/A;    ENDOSCOPY W/ BANDING N/A 03/17/2022    Procedure: ESOPHAGOGASTRODUODENOSCOPY WITH Biopsy;  Surgeon: " Yaneth Bell MD;  Location: [facility] ENDOSCOPY;  Service: Gastroenterology;  Laterality: N/A;    LIVER BIOPSY  8/23/21    PLANTAR FASCIA RELEASE      right    ROTATOR CUFF REPAIR Right 08/03/2022    SKIN BIOPSY      benign    UPPER GASTROINTESTINAL ENDOSCOPY         Family History   Problem Relation Age of Onset    Diabetes Mother     COPD Mother     Heart failure Mother     Heart disease Father     Lung cancer Father     Pancreatic cancer Paternal Aunt     Colon cancer Paternal Aunt        Social History     Tobacco Use    Smoking status: Never    Smokeless tobacco: Never   Vaping Use    Vaping status: Never Used   Substance Use Topics    Alcohol use: Not Currently    Drug use: No       Allergies:  Allergies   Allergen Reactions    Morphine And Related Anaphylaxis    Simvastatin Other (See Comments)     Liver problems    Clopidogrel Hives    Penicillins Hives     Beta lactam allergy details  Antibiotic reaction: hives  Age at reaction: child  Dose to reaction time: (!) hours  Reason for antibiotic: sore throat  Epinephrine required for reaction?: unknown  Tolerated antibiotics: augmentin, amoxicillin        Pradaxa [Dabigatran Etexilate Mesylate] Hives       Medications:    Current Outpatient Medications:     albuterol sulfate HFA (Ventolin HFA) 108 (90 Base) MCG/ACT inhaler, Inhale 2 puffs Every 6 (Six) Hours As Needed for Wheezing or Shortness of Air., Disp: 18 g, Rfl: 3    budesonide-formoterol (Symbicort) 160-4.5 MCG/ACT inhaler, Inhale 2 puffs 2 (Two) Times a Day. Rinse mouth with water after use., Disp: 1 each, Rfl: 5    Continuous Blood Gluc Sensor (Dexcom G6 Sensor), USE AS DIRECTED TO CHECK SUGARS TWICE A DAY, Disp: , Rfl:     Continuous Blood Gluc Transmit (Dexcom G6 Transmitter) misc, USE AS DIRECTED REPLACE AFTER 90 DAYS, Disp: , Rfl:     Dulaglutide (Trulicity) 1.5 MG/0.5ML solution pen-injector, Inject 1.5 mg under the skin into the appropriate area as directed., Disp: , Rfl:     Jardiance 25  "MG tablet tablet, TAKE 1 TABLET BY MOUTH EVERY DAY FOR BLOOD SUGAR, Disp: , Rfl:     lisinopril (PRINIVIL,ZESTRIL) 20 MG tablet, Take 1 tablet by mouth Daily., Disp: , Rfl:     prasugrel (EFFIENT) 10 MG tablet, Take 1 tablet by mouth Daily., Disp: , Rfl:     buPROPion SR (WELLBUTRIN SR) 150 MG 12 hr tablet, TAKE 1 TABLET BY MOUTH EVERY DAY FOR 6 DAYS, THEN INCREASE TO 1 TABLET BY MOUTH TWICE DAILY (Patient not taking: Reported on 4/8/2024), Disp: , Rfl:     carvedilol (COREG) 6.25 MG tablet, Take 1 tablet by mouth 2 (Two) Times a Day With Meals. Last Wednesday - patient out of medication. (Patient not taking: Reported on 4/8/2024), Disp: , Rfl:     Creon 13159-327564 units capsule delayed-release particles capsule, TAKE 1 CAPSULE BY MOUTH THREE TIMES DAILY WITH MEALS (Patient not taking: Reported on 4/8/2024), Disp: 270 capsule, Rfl: 1    dicyclomine (BENTYL) 20 MG tablet, Take 1 tablet by mouth 3 (Three) Times a Day As Needed (abdominal pain). (Patient not taking: Reported on 4/8/2024), Disp: 60 tablet, Rfl: 2    glyburide (DIAbeta) 5 MG tablet, Take 2 tablets by mouth 2 (Two) Times a Day With Meals. (Patient not taking: Reported on 4/8/2024), Disp: , Rfl:     nitroglycerin (NITROSTAT) 0.4 MG SL tablet, Place 1 tablet under the tongue. (Patient not taking: Reported on 4/8/2024), Disp: , Rfl:     Ozempic, 0.25 or 0.5 MG/DOSE, 2 MG/3ML solution pen-injector, ADMINISTER 0.5 MG UNDER THE SKIN EVERY 7 DAYS (Patient not taking: Reported on 4/8/2024), Disp: , Rfl:     pantoprazole (PROTONIX) 20 MG EC tablet, TAKE 1 TABLET BY MOUTH TWICE DAILY (Patient not taking: Reported on 4/8/2024), Disp: 60 tablet, Rfl: 5    vitamin E 400 UNIT capsule, Take 1 capsule by mouth 2 (Two) Times a Day. (Patient not taking: Reported on 4/8/2024), Disp: , Rfl:     OBJECTIVE:    Vital Signs:   Vitals:    04/08/24 1001   BP: 160/98   Pulse: 74   Temp: 97.5 °F (36.4 °C)   SpO2: 96%   Weight: 129 kg (285 lb)   Height: 185.4 cm (72.99\") "       Physical Exam:   General Appearance:    Alert, cooperative, in no acute distress   Head:    Normocephalic, without obvious abnormality, atraumatic   Eyes:            Lids and lashes normal, conjunctivae and sclerae normal, no   icterus, no pallor, corneas clear, PERRL   Ears:    Ears appear intact with no abnormalities noted   Throat:   No oral lesions, no thrush, oral mucosa moist   Neck:   No adenopathy, supple, trachea midline, no thyromegaly,  no JVD   Lungs:     Clear to auscultation,respirations regular, even and                  unlabored    Heart:    Regular rhythm and normal rate, normal S1 and S2, no            murmur   Abdomen:     no masses, no organomegaly, soft non-tender, non-distended, no guarding, there is no evidence of tenderness   Extremities:   Moves all extremities well, no edema, no cyanosis, no             redness   Pulses:   Pulses palpable and equal bilaterally   Skin:   No bleeding, bruising or rash, palpable lipomas present in the right triceps and left tricep regions as well as the right forearm   Lymph nodes:   No palpable adenopathy   Neurologic:   Cranial nerves 2 - 12 grossly intact, sensation intact      Results Review:   I reviewed the patient's new clinical results.  I reviewed the patient's new imaging results and agree with the interpretation.  I reviewed the patient's other test results and agree with the interpretation    Review of Systems was reviewed and confirmed as accurate as documented by the MA.    ASSESSMENT/PLAN:    1. Chronic diarrhea    2. Lipoma of right upper extremity        I recommend a colonoscopy for further evaluation. The procedure was explained as well as the risks which include but are not limited to bleeding, infection, perforation, abdominal pain etc. The patient understands these risks and the procedure and wishes to proceed.     He also needs to have local excision of some of these lipomas in the office, we will schedule this accordingly after  his colonoscopy.    Electronically signed by Arjun Delgadillo MD  04/08/24 08:36 EDT    Answers submitted by the patient for this visit:  Primary Reason for Visit (Submitted on 4/5/2024)  What is the primary reason for your visit?: Other  Other (Submitted on 4/5/2024)  Please describe your symptoms.: Knots in arms plus needing colonoscopy.  Have you had these symptoms before?: No  How long have you been having these symptoms?: Greater than 2 weeks  Please list any medications you are currently taking for this condition.: None

## 2024-04-17 RX ORDER — BISACODYL 5 MG/1
TABLET, DELAYED RELEASE ORAL
Qty: 4 TABLET | Refills: 0 | Status: SHIPPED | OUTPATIENT
Start: 2024-04-17

## 2024-04-17 RX ORDER — POLYETHYLENE GLYCOL 3350 17 G/17G
238 POWDER, FOR SOLUTION ORAL ONCE
Qty: 238 G | Refills: 0 | Status: SHIPPED | OUTPATIENT
Start: 2024-04-17 | End: 2024-04-17

## 2024-05-31 ENCOUNTER — OUTSIDE FACILITY SERVICE (OUTPATIENT)
Dept: SURGERY | Facility: CLINIC | Age: 54
End: 2024-05-31
Payer: MEDICARE

## 2024-06-12 NOTE — PROGRESS NOTES
Patient: Kb Minaya    YOB: 1970    Date: 06/13/2024    Primary Care Provider: Vilma Cassidy MD    Reason for Consultation: Follow-up colonoscopy    Chief Complaint   Patient presents with    Follow-up     colonoscopy       History of present illness:  I saw the patient in the office today as a followup from their recent colonoscopy with biopsies which was performed 05/31/2024, the pathology report did not show significant histopathologic abnormalities.  Patient is also here for follow-up palpable masses on both of his upper extremities.    Biopsies taken at the time of endoscopy were normal.  He continues to complain of lipoma regions that are painful on the tricep regions bilaterally.    The following portions of the patient's history were reviewed and updated as appropriate: allergies, current medications, past family history, past medical history, past social history, past surgical history and problem list.    Review of Systems      Review of Systems:  Constitutional:  Negative for chills, fever, and unexpected weight change.  HENT: Negative for trouble swallowing and voice change.  Eyes:  Negative for visual disturbance.  Respiratory:  Negative for apnea, cough, chest tightness, shortness of breath, and wheezing.  Cardiovascular:  Negative for chest pain, palpitations, and leg swelling.  Gastrointestinal:  Negative for abdominal distention, abdominal pain, anal bleeding, blood in stool, constipation, diarrhea, nausea, rectal pain, and vomiting.  Musculoskeletal:  Negative for back pain, gait problem, and joint swelling.  Skin:  Negative for color change, rash, and wound  Neurological:  Negative for dizziness, syncope, speech difficulty, weakness, numbness, and headaches.  Hematological:  Negative for adenopathy.  Does not bruise/bleed easily.  Psychiatric/Behavioral:  Negative for confusion.  The patient is not nervous/anxious.          Allergies:  Allergies   Allergen  Reactions    Morphine And Codeine Anaphylaxis    Simvastatin Other (See Comments)     Liver problems    Clopidogrel Hives    Penicillins Hives     Beta lactam allergy details  Antibiotic reaction: hives  Age at reaction: child  Dose to reaction time: (!) hours  Reason for antibiotic: sore throat  Epinephrine required for reaction?: unknown  Tolerated antibiotics: augmentin, amoxicillin        Pradaxa [Dabigatran Etexilate Mesylate] Hives       Medications:    Current Outpatient Medications:     carvedilol (COREG) 6.25 MG tablet, Take 1 tablet by mouth 2 (Two) Times a Day With Meals. Last Wednesday - patient out of medication., Disp: , Rfl:     Dulaglutide (Trulicity) 1.5 MG/0.5ML solution pen-injector, Inject 1.5 mg under the skin into the appropriate area as directed., Disp: , Rfl:     nitroglycerin (NITROSTAT) 0.4 MG SL tablet, Place 1 tablet under the tongue., Disp: , Rfl:     albuterol sulfate HFA (Ventolin HFA) 108 (90 Base) MCG/ACT inhaler, Inhale 2 puffs Every 6 (Six) Hours As Needed for Wheezing or Shortness of Air. (Patient not taking: Reported on 6/13/2024), Disp: 18 g, Rfl: 3    bisacodyl (Dulcolax) 5 MG EC tablet, Take 2 @ 3pm, 2 @ 7 pm day prior to colonoscopy (Patient not taking: Reported on 6/13/2024), Disp: 4 tablet, Rfl: 0    budesonide-formoterol (Symbicort) 160-4.5 MCG/ACT inhaler, Inhale 2 puffs 2 (Two) Times a Day. Rinse mouth with water after use. (Patient not taking: Reported on 6/13/2024), Disp: 1 each, Rfl: 5    buPROPion SR (WELLBUTRIN SR) 150 MG 12 hr tablet, TAKE 1 TABLET BY MOUTH EVERY DAY FOR 6 DAYS, THEN INCREASE TO 1 TABLET BY MOUTH TWICE DAILY (Patient not taking: Reported on 4/8/2024), Disp: , Rfl:     Continuous Blood Gluc Sensor (Dexcom G6 Sensor), USE AS DIRECTED TO CHECK SUGARS TWICE A DAY (Patient not taking: Reported on 6/13/2024), Disp: , Rfl:     Continuous Blood Gluc Transmit (Dexcom G6 Transmitter) misc, USE AS DIRECTED REPLACE AFTER 90 DAYS (Patient not taking: Reported  "on 6/13/2024), Disp: , Rfl:     Creon [id number] units capsule delayed-release particles capsule, TAKE 1 CAPSULE BY MOUTH THREE TIMES DAILY WITH MEALS (Patient not taking: Reported on 4/8/2024), Disp: 270 capsule, Rfl: 1    dicyclomine (BENTYL) 20 MG tablet, Take 1 tablet by mouth 3 (Three) Times a Day As Needed (abdominal pain). (Patient not taking: Reported on 4/8/2024), Disp: 60 tablet, Rfl: 2    glyburide (DIAbeta) 5 MG tablet, Take 2 tablets by mouth 2 (Two) Times a Day With Meals. (Patient not taking: Reported on 4/8/2024), Disp: , Rfl:     Jardiance 25 MG tablet tablet, TAKE 1 TABLET BY MOUTH EVERY DAY FOR BLOOD SUGAR (Patient not taking: Reported on 6/13/2024), Disp: , Rfl:     lisinopril (PRINIVIL,ZESTRIL) 20 MG tablet, Take 1 tablet by mouth Daily. (Patient not taking: Reported on 6/13/2024), Disp: , Rfl:     Ozempic, 0.25 or 0.5 MG/DOSE, 2 MG/3ML solution pen-injector, ADMINISTER 0.5 MG UNDER THE SKIN EVERY 7 DAYS (Patient not taking: Reported on 4/8/2024), Disp: , Rfl:     pantoprazole (PROTONIX) 20 MG EC tablet, TAKE 1 TABLET BY MOUTH TWICE DAILY (Patient not taking: Reported on 4/8/2024), Disp: 60 tablet, Rfl: 5    prasugrel (EFFIENT) 10 MG tablet, Take 1 tablet by mouth Daily. (Patient not taking: Reported on 6/13/2024), Disp: , Rfl:     vitamin E 400 UNIT capsule, Take 1 capsule by mouth 2 (Two) Times a Day. (Patient not taking: Reported on 4/8/2024), Disp: , Rfl:     Vital Signs:  Vitals:    06/13/24 1336   BP: 142/84   Pulse: 70   Resp: 18   Temp: 97.5 °F (36.4 °C)   TempSrc: Temporal   SpO2: 98%   Weight: 129 kg (285 lb)   Height: 185.4 cm (73\")       Physical Exam:     General Appearance:    Alert, cooperative, in no acute distress   Head:    Normocephalic, without obvious abnormality, atraumatic   Eyes:            Lids and lashes normal, conjunctivae and sclerae normal, no   icterus, no pallor, corneas clear, PERRLA   Ears:    Ears appear intact with no abnormalities noted   Throat:   No oral " lesions, no thrush, oral mucosa moist   Neck:   No adenopathy, supple, trachea midline, no thyromegaly, no   carotid bruit, no JVD   Back:     No kyphosis present, no scoliosis present, no skin lesions,      erythema or scars, no tenderness to percussion or                   palpation,   range of motion normal   Lungs:     Clear to auscultation,respirations regular, even and                  unlabored    Heart:    Regular rhythm and normal rate, normal S1 and S2, no            murmur, no gallop, no rub, no click   Chest Wall:    No abnormalities observed   Abdomen:     Normal bowel sounds, no masses, no organomegaly, soft        non-tender, non-distended, no guarding, there is evidence of right upper quadrant tenderness   Extremities:   Moves all extremities well, no edema, no cyanosis, no             redness   Pulses:   Pulses palpable and equal bilaterally   Skin:   No bleeding, bruising or rash, there is evidence of palpable lipomas on the tricep regions bilaterally   Lymph nodes:   No palpable adenopathy   Neurologic:   Cranial nerves 2 - 12 grossly intact, sensation intact, DTR       present and equal bilaterally            Results Review:   I reviewed the patient's new clinical results.  I reviewed the patient's new imaging results and agree with the interpretation.  I reviewed the patient's other test results and agree with the interpretation    Assessment / Plan:    1. Chronic diarrhea    2. Lipoma of right upper extremity        I did discuss the situation with the patient today in the office and they have done well from their recent colonoscopy with polypectomy.  I have released the patient back to normal activity.  I need to see the patient back in the office in 5 years and they will need to have repeat colonoscopy at that time.    He does need to undergo excision of these lipomas, risk and benefits were explained to him, he understands and agrees.    Electronically signed by Arjun Delgadillo  MD  06/13/24

## 2024-06-13 ENCOUNTER — OFFICE VISIT (OUTPATIENT)
Dept: SURGERY | Facility: CLINIC | Age: 54
End: 2024-06-13
Payer: MEDICARE

## 2024-06-13 VITALS
OXYGEN SATURATION: 98 % | HEIGHT: 73 IN | TEMPERATURE: 97.5 F | HEART RATE: 70 BPM | WEIGHT: 285 LBS | BODY MASS INDEX: 37.77 KG/M2 | RESPIRATION RATE: 18 BRPM | SYSTOLIC BLOOD PRESSURE: 142 MMHG | DIASTOLIC BLOOD PRESSURE: 84 MMHG

## 2024-06-13 DIAGNOSIS — D17.21 LIPOMA OF RIGHT UPPER EXTREMITY: ICD-10-CM

## 2024-06-13 DIAGNOSIS — K52.9 CHRONIC DIARRHEA: Primary | ICD-10-CM

## 2024-06-13 PROCEDURE — 1160F RVW MEDS BY RX/DR IN RCRD: CPT | Performed by: SURGERY

## 2024-06-13 PROCEDURE — 99213 OFFICE O/P EST LOW 20 MIN: CPT | Performed by: SURGERY

## 2024-06-13 PROCEDURE — 1159F MED LIST DOCD IN RCRD: CPT | Performed by: SURGERY

## 2024-06-25 NOTE — PROGRESS NOTES
Location: Right upper extremity    Procedure: Excision lipoma      I recommend excision. Procedure and the risks and benefits were explained including bleeding and infection. The patient understands these and wishes to proceed.     The patient was brought to the procedure room. Consent and time out were performed. The area was prepped and draped in the usual fashion. 1% lidocaine with epinephrine was infused locally. A incision was made over the fatty tumor suspected to be a lipoma. Excision was performed. The fatty tumor size was 4 cm. The lipoma was located in the muscle layer. The wound was closed in layers with interrupted simple vicryl and Nylon for the skin. Wound closure size was 3 cm. There were no complications and the patient tolerated the procedure well. Hemostasis was well controlled with pressure and there was minimal blood loss. Wound instructions were given.

## 2024-06-26 ENCOUNTER — PROCEDURE VISIT (OUTPATIENT)
Dept: SURGERY | Facility: CLINIC | Age: 54
End: 2024-06-26
Payer: MEDICARE

## 2024-06-26 VITALS
TEMPERATURE: 97.7 F | HEART RATE: 80 BPM | WEIGHT: 285 LBS | BODY MASS INDEX: 37.77 KG/M2 | HEIGHT: 73 IN | OXYGEN SATURATION: 96 %

## 2024-06-26 DIAGNOSIS — D17.21 LIPOMA OF RIGHT UPPER EXTREMITY: Primary | ICD-10-CM

## 2024-06-26 PROCEDURE — 24076 EX ARM/ELBOW TUM DEEP < 5 CM: CPT | Performed by: SURGERY

## 2024-06-27 LAB — REF LAB TEST METHOD: NORMAL

## 2024-07-05 NOTE — PROGRESS NOTES
Patient: Kb Minaya    YOB: 1970    Date: 07/08/2024    Primary Care Provider: Vilma Cassidy MD    Chief Complaint   Patient presents with    Post-op Follow-up     EXC       History of present illness:  I saw the patient in the office today as a followup from their recent right arm mass excision, the pathology report did show a lipoma.  They state that they have done well and are having no problems.    Vital Signs:  Vitals:    07/08/24 0944   BP: 134/72   Pulse: 81   Temp: 97.6 °F (36.4 °C)   SpO2: 96%   Weight: 130 kg (287 lb)             Assessment / Plan:    1. Post-operative state        I did discuss the situation with the patient today in the office and they have done well from their recent mass excision, I don't think that the patient needs any further intervention and I need to see them back only if they have further problems. Pathology report was reviewed with the patient in the office.    Electronically signed by Arjun Delgadillo MD  07/08/24                    Answers submitted by the patient for this visit:  Primary Reason for Visit (Submitted on 7/1/2024)  What is the primary reason for your visit?: Other  Other (Submitted on 7/1/2024)  Please describe your symptoms.: I have no symptoms. Im having stitches removed  Have you had these symptoms before?: No  How long have you been having these symptoms?: 1-4 days

## 2024-07-08 ENCOUNTER — OFFICE VISIT (OUTPATIENT)
Dept: SURGERY | Facility: CLINIC | Age: 54
End: 2024-07-08
Payer: MEDICARE

## 2024-07-08 VITALS
OXYGEN SATURATION: 96 % | SYSTOLIC BLOOD PRESSURE: 134 MMHG | WEIGHT: 287 LBS | BODY MASS INDEX: 37.87 KG/M2 | TEMPERATURE: 97.6 F | HEART RATE: 81 BPM | DIASTOLIC BLOOD PRESSURE: 72 MMHG

## 2024-07-08 DIAGNOSIS — Z98.890 POST-OPERATIVE STATE: Primary | ICD-10-CM

## 2024-07-08 PROCEDURE — 99024 POSTOP FOLLOW-UP VISIT: CPT | Performed by: SURGERY

## 2024-07-08 PROCEDURE — 1160F RVW MEDS BY RX/DR IN RCRD: CPT | Performed by: SURGERY

## 2024-07-08 PROCEDURE — 1159F MED LIST DOCD IN RCRD: CPT | Performed by: SURGERY

## 2024-07-08 RX ORDER — TRAZODONE HYDROCHLORIDE 50 MG/1
50 TABLET ORAL DAILY
COMMUNITY
Start: 2024-03-27

## 2024-09-19 ENCOUNTER — HOSPITAL ENCOUNTER (EMERGENCY)
Facility: HOSPITAL | Age: 54
Discharge: HOME | End: 2024-09-19
Attending: STUDENT IN AN ORGANIZED HEALTH CARE EDUCATION/TRAINING PROGRAM
Payer: MEDICARE

## 2024-09-19 ENCOUNTER — APPOINTMENT (OUTPATIENT)
Dept: GENERAL RADIOLOGY | Facility: HOSPITAL | Age: 54
End: 2024-09-19
Payer: MEDICARE

## 2024-09-19 VITALS
DIASTOLIC BLOOD PRESSURE: 108 MMHG | BODY MASS INDEX: 36.31 KG/M2 | HEIGHT: 73 IN | SYSTOLIC BLOOD PRESSURE: 167 MMHG | OXYGEN SATURATION: 98 % | RESPIRATION RATE: 18 BRPM | TEMPERATURE: 97.5 F | HEART RATE: 57 BPM | WEIGHT: 274 LBS

## 2024-09-19 DIAGNOSIS — R07.9 CHEST PAIN, UNSPECIFIED TYPE: Primary | ICD-10-CM

## 2024-09-19 DIAGNOSIS — R73.9 HYPERGLYCEMIA: ICD-10-CM

## 2024-09-19 LAB
ALBUMIN SERPL-MCNC: 3.5 G/DL (ref 3.5–5.2)
ALBUMIN/GLOB SERPL: 1.1 G/DL
ALP SERPL-CCNC: 130 U/L (ref 39–117)
ALT SERPL W P-5'-P-CCNC: 69 U/L (ref 1–41)
ANION GAP SERPL CALCULATED.3IONS-SCNC: 12.3 MMOL/L (ref 5–15)
AST SERPL-CCNC: 45 U/L (ref 1–40)
B PARAPERT DNA SPEC QL NAA+PROBE: NOT DETECTED
B PERT DNA SPEC QL NAA+PROBE: NOT DETECTED
BASOPHILS # BLD AUTO: 0.03 10*3/MM3 (ref 0–0.2)
BASOPHILS NFR BLD AUTO: 0.7 % (ref 0–1.5)
BILIRUB SERPL-MCNC: 0.8 MG/DL (ref 0–1.2)
BILIRUB UR QL STRIP: NEGATIVE
BUN SERPL-MCNC: 11 MG/DL (ref 6–20)
BUN/CREAT SERPL: 14.1 (ref 7–25)
C PNEUM DNA NPH QL NAA+NON-PROBE: NOT DETECTED
CALCIUM SPEC-SCNC: 9 MG/DL (ref 8.6–10.5)
CHLORIDE SERPL-SCNC: 103 MMOL/L (ref 98–107)
CK SERPL-CCNC: 106 U/L (ref 20–200)
CLARITY UR: ABNORMAL
CO2 SERPL-SCNC: 20.7 MMOL/L (ref 22–29)
COLOR UR: ABNORMAL
CREAT SERPL-MCNC: 0.78 MG/DL (ref 0.76–1.27)
DEPRECATED RDW RBC AUTO: 44.8 FL (ref 37–54)
EGFRCR SERPLBLD CKD-EPI 2021: 104.7 ML/MIN/1.73
EOSINOPHIL # BLD AUTO: 0.07 10*3/MM3 (ref 0–0.4)
EOSINOPHIL NFR BLD AUTO: 1.6 % (ref 0.3–6.2)
ERYTHROCYTE [DISTWIDTH] IN BLOOD BY AUTOMATED COUNT: 13.8 % (ref 12.3–15.4)
FLUAV SUBTYP SPEC NAA+PROBE: NOT DETECTED
FLUBV RNA ISLT QL NAA+PROBE: NOT DETECTED
GEN 5 2HR TROPONIN T REFLEX: 7 NG/L
GLOBULIN UR ELPH-MCNC: 3.2 GM/DL
GLUCOSE BLDC GLUCOMTR-MCNC: 120 MG/DL (ref 70–130)
GLUCOSE SERPL-MCNC: 248 MG/DL (ref 65–99)
GLUCOSE UR STRIP-MCNC: ABNORMAL MG/DL
HADV DNA SPEC NAA+PROBE: NOT DETECTED
HCOV 229E RNA SPEC QL NAA+PROBE: NOT DETECTED
HCOV HKU1 RNA SPEC QL NAA+PROBE: NOT DETECTED
HCOV NL63 RNA SPEC QL NAA+PROBE: NOT DETECTED
HCOV OC43 RNA SPEC QL NAA+PROBE: NOT DETECTED
HCT VFR BLD AUTO: 45 % (ref 37.5–51)
HGB BLD-MCNC: 15.6 G/DL (ref 13–17.7)
HGB UR QL STRIP.AUTO: NEGATIVE
HMPV RNA NPH QL NAA+NON-PROBE: NOT DETECTED
HOLD SPECIMEN: NORMAL
HOLD SPECIMEN: NORMAL
HPIV1 RNA ISLT QL NAA+PROBE: NOT DETECTED
HPIV2 RNA SPEC QL NAA+PROBE: NOT DETECTED
HPIV3 RNA NPH QL NAA+PROBE: NOT DETECTED
HPIV4 P GENE NPH QL NAA+PROBE: NOT DETECTED
IMM GRANULOCYTES # BLD AUTO: 0 10*3/MM3 (ref 0–0.05)
IMM GRANULOCYTES NFR BLD AUTO: 0 % (ref 0–0.5)
KETONES UR QL STRIP: NEGATIVE
LEUKOCYTE ESTERASE UR QL STRIP.AUTO: NEGATIVE
LIPASE SERPL-CCNC: 29 U/L (ref 13–60)
LYMPHOCYTES # BLD AUTO: 1.28 10*3/MM3 (ref 0.7–3.1)
LYMPHOCYTES NFR BLD AUTO: 28.4 % (ref 19.6–45.3)
M PNEUMO IGG SER IA-ACNC: NOT DETECTED
MCH RBC QN AUTO: 30.8 PG (ref 26.6–33)
MCHC RBC AUTO-ENTMCNC: 34.7 G/DL (ref 31.5–35.7)
MCV RBC AUTO: 88.9 FL (ref 79–97)
MONOCYTES # BLD AUTO: 0.29 10*3/MM3 (ref 0.1–0.9)
MONOCYTES NFR BLD AUTO: 6.4 % (ref 5–12)
NEUTROPHILS NFR BLD AUTO: 2.83 10*3/MM3 (ref 1.7–7)
NEUTROPHILS NFR BLD AUTO: 62.9 % (ref 42.7–76)
NITRITE UR QL STRIP: NEGATIVE
NRBC BLD AUTO-RTO: 0 /100 WBC (ref 0–0.2)
PH UR STRIP.AUTO: 6.5 [PH] (ref 5–8)
PLATELET # BLD AUTO: 104 10*3/MM3 (ref 140–450)
PMV BLD AUTO: 10.8 FL (ref 6–12)
POTASSIUM SERPL-SCNC: 4.2 MMOL/L (ref 3.5–5.2)
PROT SERPL-MCNC: 6.7 G/DL (ref 6–8.5)
PROT UR QL STRIP: ABNORMAL
RBC # BLD AUTO: 5.06 10*6/MM3 (ref 4.14–5.8)
RHINOVIRUS RNA SPEC NAA+PROBE: NOT DETECTED
RSV RNA NPH QL NAA+NON-PROBE: NOT DETECTED
SARS-COV-2 RNA NPH QL NAA+NON-PROBE: NOT DETECTED
SODIUM SERPL-SCNC: 136 MMOL/L (ref 136–145)
SP GR UR STRIP: >=1.03 (ref 1–1.03)
TROPONIN T DELTA: 0 NG/L
TROPONIN T SERPL HS-MCNC: 7 NG/L
TSH SERPL DL<=0.05 MIU/L-ACNC: 0.61 UIU/ML (ref 0.27–4.2)
UROBILINOGEN UR QL STRIP: ABNORMAL
WBC NRBC COR # BLD AUTO: 4.5 10*3/MM3 (ref 3.4–10.8)
WHOLE BLOOD HOLD COAG: NORMAL
WHOLE BLOOD HOLD SPECIMEN: NORMAL

## 2024-09-19 PROCEDURE — 80053 COMPREHEN METABOLIC PANEL: CPT | Performed by: STUDENT IN AN ORGANIZED HEALTH CARE EDUCATION/TRAINING PROGRAM

## 2024-09-19 PROCEDURE — 85025 COMPLETE CBC W/AUTO DIFF WBC: CPT | Performed by: STUDENT IN AN ORGANIZED HEALTH CARE EDUCATION/TRAINING PROGRAM

## 2024-09-19 PROCEDURE — 83690 ASSAY OF LIPASE: CPT | Performed by: STUDENT IN AN ORGANIZED HEALTH CARE EDUCATION/TRAINING PROGRAM

## 2024-09-19 PROCEDURE — 25010000002 DROPERIDOL PER 5 MG: Performed by: STUDENT IN AN ORGANIZED HEALTH CARE EDUCATION/TRAINING PROGRAM

## 2024-09-19 PROCEDURE — 25810000003 LACTATED RINGERS SOLUTION: Performed by: STUDENT IN AN ORGANIZED HEALTH CARE EDUCATION/TRAINING PROGRAM

## 2024-09-19 PROCEDURE — 82948 REAGENT STRIP/BLOOD GLUCOSE: CPT

## 2024-09-19 PROCEDURE — 81003 URINALYSIS AUTO W/O SCOPE: CPT | Performed by: STUDENT IN AN ORGANIZED HEALTH CARE EDUCATION/TRAINING PROGRAM

## 2024-09-19 PROCEDURE — 84484 ASSAY OF TROPONIN QUANT: CPT | Performed by: STUDENT IN AN ORGANIZED HEALTH CARE EDUCATION/TRAINING PROGRAM

## 2024-09-19 PROCEDURE — 82550 ASSAY OF CK (CPK): CPT | Performed by: STUDENT IN AN ORGANIZED HEALTH CARE EDUCATION/TRAINING PROGRAM

## 2024-09-19 PROCEDURE — 93005 ELECTROCARDIOGRAM TRACING: CPT | Performed by: STUDENT IN AN ORGANIZED HEALTH CARE EDUCATION/TRAINING PROGRAM

## 2024-09-19 PROCEDURE — 99284 EMERGENCY DEPT VISIT MOD MDM: CPT

## 2024-09-19 PROCEDURE — 71045 X-RAY EXAM CHEST 1 VIEW: CPT

## 2024-09-19 PROCEDURE — 84443 ASSAY THYROID STIM HORMONE: CPT | Performed by: STUDENT IN AN ORGANIZED HEALTH CARE EDUCATION/TRAINING PROGRAM

## 2024-09-19 PROCEDURE — 0202U NFCT DS 22 TRGT SARS-COV-2: CPT | Performed by: STUDENT IN AN ORGANIZED HEALTH CARE EDUCATION/TRAINING PROGRAM

## 2024-09-19 PROCEDURE — 96374 THER/PROPH/DIAG INJ IV PUSH: CPT

## 2024-09-19 PROCEDURE — 36415 COLL VENOUS BLD VENIPUNCTURE: CPT

## 2024-09-19 RX ORDER — SODIUM CHLORIDE 0.9 % (FLUSH) 0.9 %
10 SYRINGE (ML) INJECTION AS NEEDED
Status: DISCONTINUED | OUTPATIENT
Start: 2024-09-19 | End: 2024-09-19 | Stop reason: HOSPADM

## 2024-09-19 RX ORDER — ASPIRIN 325 MG
325 TABLET ORAL ONCE
Status: COMPLETED | OUTPATIENT
Start: 2024-09-19 | End: 2024-09-19

## 2024-09-19 RX ORDER — NICOTINE POLACRILEX 4 MG
15 LOZENGE BUCCAL
Status: DISCONTINUED | OUTPATIENT
Start: 2024-09-19 | End: 2024-09-19 | Stop reason: HOSPADM

## 2024-09-19 RX ORDER — DROPERIDOL 2.5 MG/ML
1.25 INJECTION, SOLUTION INTRAMUSCULAR; INTRAVENOUS ONCE
Status: COMPLETED | OUTPATIENT
Start: 2024-09-19 | End: 2024-09-19

## 2024-09-19 RX ORDER — DEXTROSE MONOHYDRATE 25 G/50ML
25 INJECTION, SOLUTION INTRAVENOUS
Status: DISCONTINUED | OUTPATIENT
Start: 2024-09-19 | End: 2024-09-19 | Stop reason: HOSPADM

## 2024-09-19 RX ORDER — INSULIN LISPRO 100 [IU]/ML
2-7 INJECTION, SOLUTION INTRAVENOUS; SUBCUTANEOUS ONCE
Status: DISCONTINUED | OUTPATIENT
Start: 2024-09-19 | End: 2024-09-19 | Stop reason: HOSPADM

## 2024-09-19 RX ADMIN — ASPIRIN 325 MG: 325 TABLET ORAL at 16:02

## 2024-09-19 RX ADMIN — DROPERIDOL 1.25 MG: 2.5 INJECTION, SOLUTION INTRAMUSCULAR; INTRAVENOUS at 17:59

## 2024-09-19 RX ADMIN — SODIUM CHLORIDE, POTASSIUM CHLORIDE, SODIUM LACTATE AND CALCIUM CHLORIDE 1000 ML: 600; 310; 30; 20 INJECTION, SOLUTION INTRAVENOUS at 18:15

## 2024-10-10 ENCOUNTER — HOSPITAL ENCOUNTER (EMERGENCY)
Facility: HOSPITAL | Age: 54
Discharge: HOME | End: 2024-10-10
Attending: STUDENT IN AN ORGANIZED HEALTH CARE EDUCATION/TRAINING PROGRAM
Payer: MEDICARE

## 2024-10-10 VITALS
BODY MASS INDEX: 36.18 KG/M2 | WEIGHT: 273 LBS | HEART RATE: 94 BPM | TEMPERATURE: 99.6 F | SYSTOLIC BLOOD PRESSURE: 159 MMHG | DIASTOLIC BLOOD PRESSURE: 87 MMHG | RESPIRATION RATE: 20 BRPM | HEIGHT: 73 IN | OXYGEN SATURATION: 97 %

## 2024-10-10 DIAGNOSIS — U07.1 COVID: Primary | ICD-10-CM

## 2024-10-10 DIAGNOSIS — R11.2 NAUSEA AND VOMITING, UNSPECIFIED VOMITING TYPE: ICD-10-CM

## 2024-10-10 LAB
FLUAV RNA RESP QL NAA+PROBE: NOT DETECTED
FLUBV RNA RESP QL NAA+PROBE: NOT DETECTED
RSV RNA RESP QL NAA+PROBE: NOT DETECTED
S PYO AG THROAT QL: NEGATIVE
SARS-COV-2 RNA RESP QL NAA+PROBE: DETECTED

## 2024-10-10 PROCEDURE — 87880 STREP A ASSAY W/OPTIC: CPT

## 2024-10-10 PROCEDURE — 99283 EMERGENCY DEPT VISIT LOW MDM: CPT

## 2024-10-10 PROCEDURE — 87637 SARSCOV2&INF A&B&RSV AMP PRB: CPT

## 2024-10-10 PROCEDURE — 87081 CULTURE SCREEN ONLY: CPT

## 2024-10-10 PROCEDURE — 63710000001 ONDANSETRON ODT 4 MG TABLET DISPERSIBLE

## 2024-10-10 RX ORDER — ONDANSETRON 4 MG/1
4 TABLET, ORALLY DISINTEGRATING ORAL ONCE
Status: COMPLETED | OUTPATIENT
Start: 2024-10-10 | End: 2024-10-10

## 2024-10-10 RX ORDER — FLUTICASONE PROPIONATE 50 UG/1
2 SPRAY, METERED NASAL DAILY
Qty: 9.9 ML | Refills: 0 | Status: SHIPPED | OUTPATIENT
Start: 2024-10-10 | End: 2024-10-24

## 2024-10-10 RX ORDER — ACETAMINOPHEN 325 MG/1
975 TABLET ORAL ONCE
Status: COMPLETED | OUTPATIENT
Start: 2024-10-10 | End: 2024-10-10

## 2024-10-10 RX ORDER — ONDANSETRON 4 MG/1
4 TABLET, ORALLY DISINTEGRATING ORAL EVERY 8 HOURS PRN
Qty: 21 TABLET | Refills: 0 | Status: SHIPPED | OUTPATIENT
Start: 2024-10-10 | End: 2024-10-17

## 2024-10-10 RX ORDER — BROMPHENIRAMINE MALEATE, PSEUDOEPHEDRINE HYDROCHLORIDE, AND DEXTROMETHORPHAN HYDROBROMIDE 2; 30; 10 MG/5ML; MG/5ML; MG/5ML
5 SYRUP ORAL 3 TIMES DAILY PRN
Qty: 60 ML | Refills: 0 | Status: SHIPPED | OUTPATIENT
Start: 2024-10-10 | End: 2024-10-14

## 2024-10-10 RX ADMIN — ONDANSETRON 4 MG: 4 TABLET, ORALLY DISINTEGRATING ORAL at 20:40

## 2024-10-10 RX ADMIN — ACETAMINOPHEN 975 MG: 325 TABLET, FILM COATED ORAL at 20:40

## 2024-10-10 NOTE — Clinical Note
[facility] EMERGENCY DEPARTMENT  [address] [address]  Phone: [phone]    Kb Minaya was seen and treated in our emergency department on 10/10/2024.  He may return to work on 10/15/2024.         Thank you for choosing [facility].    Eric Lerma PA-C

## 2024-10-11 NOTE — ED PROVIDER NOTES
" EMERGENCY DEPARTMENT ENCOUNTER    Pt Name: Kb Minaya  MRN: [id number]  Pt :   1970  Room Number:    Date of encounter:  10/10/2024  PCP: Vilma Cassidy MD  ED Provider: Eric Lerma PA-C    Historian:  patient, wife at bedside, nursing notes      HPI:  Chief Complaint: Back pain, nausea and vomiting, cough, congestion        Context: Kb Minaya is a 54 y.o. male who presents to the ED c/o flulike symptoms for the past 24 hours.  Patient states his son a current is currently sick with COVID at home.  Patient describes fever, body aches, vomiting and nausea, cough and congestion.  Patient denies any chest pain or abdominal pain.      PAST MEDICAL HISTORY  Past Medical History:   Diagnosis Date    Ankle fracture, left     Anxiety     Anxiety and depression     Asthma     Back pain     Blood clot in vein 2021    right leg-states has resolved now    Cirrhosis     CKD (chronic kidney disease)     pt denies 21    Colon polyp     Coronary artery disease     Depression     Diabetes mellitus     Esophageal varices     Fatty liver     GERD (gastroesophageal reflux disease)     H/O echocardiogram 2021    History of nuclear stress test 2020    \"I did ok with it\"    Hyperlipidemia     Hypertension     Kidney stone     Murmur     Myocardial infarction     MI IN  (stent x 1),  (no stents), 2020 (no stents with last MI).  Just saw Dr. Duenas recently for a f/u.  (assessed 21)    Pancreatitis     Pilonidal cyst     Plantar fasciitis     bilateral    Seasonal allergies     Sleep apnea     CPAP-\"I don't wear it every night\"    Urinary frequency     Wears glasses     Wrist fracture, left          PAST SURGICAL HISTORY  Past Surgical History:   Procedure Laterality Date    APPENDECTOMY      CARDIAC CATHETERIZATION  2006    stent x 1    CARDIAC CATHETERIZATION      no stents    CHOLECYSTECTOMY  2021    COLONOSCOPY  2024    COLONOSCOPY " N/A 11/23/2021    Procedure: COLONOSCOPY with biopsies;  Surgeon: Yaneth Bell MD;  Location: [facility] ENDOSCOPY;  Service: Gastroenterology;  Laterality: N/A;    CYST REMOVAL      lower part of spine.    ENDOSCOPY      ENDOSCOPY N/A 07/07/2021    Procedure: ESOPHAGOGASTRODUODENOSCOPY WITH BOPSIES;  Surgeon: Arjun Delgadillo MD;  Location: [facility] ENDOSCOPY;  Service: Gastroenterology;  Laterality: N/A;    ENDOSCOPY W/ BANDING N/A 03/17/2022    Procedure: ESOPHAGOGASTRODUODENOSCOPY WITH Biopsy;  Surgeon: Yaneth Bell MD;  Location: [facility] ENDOSCOPY;  Service: Gastroenterology;  Laterality: N/A;    LIPOMA EXCISION      LIVER BIOPSY  8/23/21    PLANTAR FASCIA RELEASE      right    ROTATOR CUFF REPAIR Right 08/03/2022    SKIN BIOPSY      benign    UPPER GASTROINTESTINAL ENDOSCOPY           FAMILY HISTORY  Family History   Problem Relation Age of Onset    Diabetes Mother     COPD Mother     Heart failure Mother     Heart disease Father     Lung cancer Father     Pancreatic cancer Paternal Aunt     Colon cancer Paternal Aunt          SOCIAL HISTORY  Social History     Socioeconomic History    Marital status:    Tobacco Use    Smoking status: Never    Smokeless tobacco: Never   Vaping Use    Vaping status: Never Used   Substance and Sexual Activity    Alcohol use: Not Currently    Drug use: No    Sexual activity: Yes     Partners: Female     Birth control/protection: None         ALLERGIES  Morphine and codeine, Simvastatin, Clopidogrel, Penicillins, and Pradaxa [dabigatran etexilate mesylate]        REVIEW OF SYSTEMS  Review of Systems   Constitutional:  Positive for chills and fever.   HENT:  Positive for sore throat. Negative for congestion.    Respiratory:  Negative for cough and shortness of breath.    Cardiovascular:  Negative for chest pain.   Gastrointestinal:  Positive for diarrhea, nausea and vomiting. Negative for abdominal pain.   Genitourinary:  Negative for dysuria.   Musculoskeletal:   Positive for myalgias. Negative for back pain.   Skin:  Negative for wound.   Neurological:  Negative for dizziness and headaches.   Psychiatric/Behavioral:  Negative for confusion.    All other systems reviewed and are negative.         All systems reviewed and negative except for those discussed in HPI.       PHYSICAL EXAM    I have reviewed the triage vital signs and nursing notes.    ED Triage Vitals [10/10/24 2017]   Temp Heart Rate Resp BP SpO2   (!) 101.3 °F (38.5 °C) 97 20 154/95 98 %      Temp src Heart Rate Source Patient Position BP Location FiO2 (%)   Oral Monitor Sitting Left arm --       Physical Exam  Vitals and nursing note reviewed.   Constitutional:       General: He is not in acute distress.     Appearance: Normal appearance. He is ill-appearing. He is not toxic-appearing or diaphoretic.   HENT:      Head: Normocephalic and atraumatic.      Right Ear: Tympanic membrane, ear canal and external ear normal.      Left Ear: Tympanic membrane, ear canal and external ear normal.      Nose: Congestion present. No rhinorrhea.      Mouth/Throat:      Mouth: Mucous membranes are moist.      Pharynx: Oropharynx is clear. Posterior oropharyngeal erythema present.   Eyes:      General: No scleral icterus.     Conjunctiva/sclera: Conjunctivae normal.   Cardiovascular:      Rate and Rhythm: Normal rate.      Heart sounds: Normal heart sounds.   Pulmonary:      Effort: Pulmonary effort is normal. No respiratory distress.      Breath sounds: Normal breath sounds. No wheezing.   Abdominal:      Tenderness: There is no abdominal tenderness. There is no guarding or rebound.   Musculoskeletal:      Cervical back: Normal range of motion and neck supple. No rigidity.      Right lower leg: No edema.      Left lower leg: No edema.   Skin:     Findings: No rash.   Neurological:      Mental Status: He is alert.             LAB RESULTS  Recent Results (from the past 24 hour(s))   COVID-19, FLU A/B, RSV PCR 1 HR TAT - Swab,  Nasopharynx    Collection Time: 10/10/24  8:41 PM    Specimen: Nasopharynx; Swab   Result Value Ref Range    COVID19 Detected (C) Not Detected - Ref. Range    Influenza A PCR Not Detected Not Detected    Influenza B PCR Not Detected Not Detected    RSV, PCR Not Detected Not Detected   Rapid Strep A Screen - Swab, Throat    Collection Time: 10/10/24  8:41 PM    Specimen: Throat; Swab   Result Value Ref Range    Strep A Ag Negative Negative       If labs were ordered, I independently reviewed the results and considered them in treating the patient.        RADIOLOGY  No Radiology Exams Resulted Within Past 24 Hours        PROCEDURES    Procedures    No orders to display       MEDICATIONS GIVEN IN ER    Medications   acetaminophen (TYLENOL) tablet 975 mg (975 mg Oral Given 10/10/24 2040)   ondansetron ODT (ZOFRAN-ODT) disintegrating tablet 4 mg (4 mg Oral Given 10/10/24 2040)         MEDICAL DECISION MAKING, PROGRESS, and CONSULTS    All labs, if obtained, have been independently reviewed by me.  All radiology studies, if obtained, have been reviewed by me and the radiologist dictating the report.  All EKG's, if obtained, have been independently viewed and interpreted by me/my attending physician.      Discussion below represents my analysis of pertinent findings related to patient's condition, differential diagnosis, treatment plan and final disposition.    54-year-old male presented to ER for evaluation of flulike symptoms on exam the patient is upright alert and oriented he is notably congested and is mildly ill-appearing.  His vital signs show he is febrile at 101.3 Fahrenheit, mildly hypertensive heart rate is 94 and his SpO2 is 97% on room air.  Rest of physical exam is reassuring with lungs are clear to auscultation bilaterally.  Flu COVID testing and strep testing was ordered in the ER and patient was given Zofran and Tylenol.    COVID testing was positive today flu and RSV negative and strep test was negative.   I suspect COVID is the primary cause of the patient's fever and other related symptoms.  Will recommend alternating Tylenol and ibuprofen and prescribed symptomatic management including Zofran for vomiting Bromfed and Flonase spray strict ED return precautions explained and PCP follow-up recommended patient verbalized understanding of and agreement with this plan of care.                     Differential diagnosis:    Differential diagnosis included but was not limited to flu, COVID, RSV, strep pharyngitis, viral syndrome      Additional sources:    - Discussed/ obtained information from independent historians: Patient's wife at bedside in addition to patient    - External (non-ED) record review: None    - Chronic or social conditions impacting care: None    Orders placed during this visit:  Orders Placed This Encounter   Procedures    COVID-19, FLU A/B, RSV PCR 1 HR TAT - Swab, Nasopharynx    Rapid Strep A Screen - Swab, Throat    Beta Strep Culture, Throat - Swab, Throat         Additional orders considered but not ordered: None      ED Course:    Consultants: None    ED Course as of 10/10/24 2152   Thu Oct 10, 2024   2136 COVID19(!!): Detected [TG]      ED Course User Index  [TG] Eric Lerma PA-C              Shared Decision Making:  After my consideration of clinical presentation and any laboratory/radiology studies obtained, I discussed the findings with the patient/patient representative who is in agreement with the treatment plan and the final disposition.   Risks and benefits of discharge and/or observation/admission were discussed.       AS OF 21:52 EDT VITALS:    BP - 159/87  HR - 94  TEMP - (!) 101.3 °F (38.5 °C) (Oral)  O2 SATS - 97%                  DIAGNOSIS  Final diagnoses:   COVID   Nausea and vomiting, unspecified vomiting type         DISPOSITION  Discharge home      Please note that portions of this document were completed with voice recognition software.      Eric Lerma,  SANTOSH  10/10/24 [id number]

## 2024-10-11 NOTE — DISCHARGE INSTRUCTIONS
It is very important return to the ER for any acute changes or worsening of your condition.  Take Tylenol alternating with ibuprofen every 6 hours as needed for fever.  Your other medications can be taken for symptomatic management as prescribed.  Follow-up with your primary care within 4 to 5 days if your symptoms have not resolved.

## 2024-10-13 LAB — BACTERIA SPEC AEROBE CULT: NORMAL

## 2024-11-09 ENCOUNTER — HOSPITAL ENCOUNTER (EMERGENCY)
Facility: HOSPITAL | Age: 54
Discharge: HOME | End: 2024-11-09
Attending: STUDENT IN AN ORGANIZED HEALTH CARE EDUCATION/TRAINING PROGRAM
Payer: MEDICARE

## 2024-11-09 ENCOUNTER — APPOINTMENT (OUTPATIENT)
Dept: GENERAL RADIOLOGY | Facility: HOSPITAL | Age: 54
End: 2024-11-09
Payer: MEDICARE

## 2024-11-09 VITALS
HEART RATE: 77 BPM | WEIGHT: 266 LBS | OXYGEN SATURATION: 97 % | TEMPERATURE: 98 F | RESPIRATION RATE: 18 BRPM | DIASTOLIC BLOOD PRESSURE: 98 MMHG | BODY MASS INDEX: 35.25 KG/M2 | HEIGHT: 73 IN | SYSTOLIC BLOOD PRESSURE: 164 MMHG

## 2024-11-09 DIAGNOSIS — S89.92XA INJURY OF LEFT KNEE, INITIAL ENCOUNTER: Primary | ICD-10-CM

## 2024-11-09 PROCEDURE — 73562 X-RAY EXAM OF KNEE 3: CPT

## 2024-11-09 PROCEDURE — 99283 EMERGENCY DEPT VISIT LOW MDM: CPT | Performed by: STUDENT IN AN ORGANIZED HEALTH CARE EDUCATION/TRAINING PROGRAM

## 2024-11-09 PROCEDURE — 63710000001 ONDANSETRON ODT 4 MG TABLET DISPERSIBLE: Performed by: STUDENT IN AN ORGANIZED HEALTH CARE EDUCATION/TRAINING PROGRAM

## 2024-11-09 PROCEDURE — 99283 EMERGENCY DEPT VISIT LOW MDM: CPT

## 2024-11-09 RX ORDER — ONDANSETRON 4 MG/1
4 TABLET, ORALLY DISINTEGRATING ORAL ONCE
Status: COMPLETED | OUTPATIENT
Start: 2024-11-09 | End: 2024-11-09

## 2024-11-09 RX ORDER — MELOXICAM 7.5 MG/1
7.5 TABLET ORAL DAILY
Qty: 7 TABLET | Refills: 0 | Status: SHIPPED | OUTPATIENT
Start: 2024-11-09 | End: 2024-11-16

## 2024-11-09 RX ORDER — METHOCARBAMOL 500 MG/1
500 TABLET, FILM COATED ORAL 4 TIMES DAILY
Qty: 12 TABLET | Refills: 0 | Status: SHIPPED | OUTPATIENT
Start: 2024-11-09 | End: 2024-11-12

## 2024-11-09 RX ORDER — OXYCODONE AND ACETAMINOPHEN 5; 325 MG/1; MG/1
1 TABLET ORAL ONCE
Status: COMPLETED | OUTPATIENT
Start: 2024-11-09 | End: 2024-11-09

## 2024-11-09 RX ADMIN — OXYCODONE HYDROCHLORIDE AND ACETAMINOPHEN 1 TABLET: 5; 325 TABLET ORAL at 21:13

## 2024-11-09 RX ADMIN — ONDANSETRON 4 MG: 4 TABLET, ORALLY DISINTEGRATING ORAL at 21:13

## 2024-11-09 NOTE — Clinical Note
[facility] EMERGENCY DEPARTMENT  [address] [address]  Phone: [phone]    Kb Minaya was seen and treated in our emergency department on 11/9/2024.  He may return to work on 11/12/2024.         Thank you for choosing [facility].    Eric Lerma PA-C

## 2024-11-10 NOTE — DISCHARGE INSTRUCTIONS
We recommend resting elevating and icing your knee as tolerated, using her hinged knee brace when ambulating, and following up with orthopedic surgery in the next 3 to 5 days if your pain symptoms do not resolve.  Return to the ER for any acute changes or worsening of your condition.

## 2024-11-10 NOTE — ED PROVIDER NOTES
" EMERGENCY DEPARTMENT ENCOUNTER    Pt Name: Kb Minaya  MRN: [id number]  Pt :   1970  Room Number:  23SF/23  Date of encounter:  2024  PCP: Vilma Cassidy MD  ED Provider: Eric Lerma PA-C    Historian: Patient, patient's wife at bedside, nursing notes      HPI:  Chief Complaint: Fall, left knee injury        Context: Kb Minaya is a 54 y.o. male who presents to the ED c/o a fall which occurred earlier today.  Patient states he fell from a height of standing and landed on his left knee.  He reports pain and swelling in the knee since then.  He denies any head injury or loss of consciousness.  Patient denies any numbness or tingling, ankle pain, foot pain, hip pain, or any other complaint.      PAST MEDICAL HISTORY  Past Medical History:   Diagnosis Date    Ankle fracture, left     Anxiety     Anxiety and depression     Asthma     Back pain     Blood clot in vein 2021    right leg-states has resolved now    Cirrhosis     CKD (chronic kidney disease)     pt denies 21    Colon polyp     Coronary artery disease     Depression     Diabetes mellitus     Esophageal varices     Fatty liver     GERD (gastroesophageal reflux disease)     H/O echocardiogram 2021    History of nuclear stress test 2020    \"I did ok with it\"    Hyperlipidemia     Hypertension     Kidney stone     Murmur     Myocardial infarction     MI IN  (stent x 1),  (no stents), 2020 (no stents with last MI).  Just saw Dr. Duenas recently for a f/u.  (assessed 21)    Pancreatitis     Pilonidal cyst     Plantar fasciitis     bilateral    Seasonal allergies     Sleep apnea     CPAP-\"I don't wear it every night\"    Urinary frequency     Wears glasses     Wrist fracture, left          PAST SURGICAL HISTORY  Past Surgical History:   Procedure Laterality Date    APPENDECTOMY      CARDIAC CATHETERIZATION  2006    stent x 1    CARDIAC CATHETERIZATION      no stents    " CHOLECYSTECTOMY  08/23/2021    COLONOSCOPY  05/31/2024    COLONOSCOPY N/A 11/23/2021    Procedure: COLONOSCOPY with biopsies;  Surgeon: Yaneth Bell MD;  Location: [facility] ENDOSCOPY;  Service: Gastroenterology;  Laterality: N/A;    CYST REMOVAL      lower part of spine.    ENDOSCOPY      ENDOSCOPY N/A 07/07/2021    Procedure: ESOPHAGOGASTRODUODENOSCOPY WITH BOPSIES;  Surgeon: Arjun Delgadillo MD;  Location: [facility] ENDOSCOPY;  Service: Gastroenterology;  Laterality: N/A;    ENDOSCOPY W/ BANDING N/A 03/17/2022    Procedure: ESOPHAGOGASTRODUODENOSCOPY WITH Biopsy;  Surgeon: Yaneth Bell MD;  Location: [facility] ENDOSCOPY;  Service: Gastroenterology;  Laterality: N/A;    LIPOMA EXCISION      LIVER BIOPSY  8/23/21    PLANTAR FASCIA RELEASE      right    ROTATOR CUFF REPAIR Right 08/03/2022    SKIN BIOPSY      benign    UPPER GASTROINTESTINAL ENDOSCOPY           FAMILY HISTORY  Family History   Problem Relation Age of Onset    Diabetes Mother     COPD Mother     Heart failure Mother     Heart disease Father     Lung cancer Father     Pancreatic cancer Paternal Aunt     Colon cancer Paternal Aunt          SOCIAL HISTORY  Social History     Socioeconomic History    Marital status:    Tobacco Use    Smoking status: Never    Smokeless tobacco: Never   Vaping Use    Vaping status: Never Used   Substance and Sexual Activity    Alcohol use: Not Currently    Drug use: No    Sexual activity: Yes     Partners: Female     Birth control/protection: None         ALLERGIES  Morphine and codeine, Simvastatin, Clopidogrel, Penicillins, and Pradaxa [dabigatran etexilate mesylate]        REVIEW OF SYSTEMS  Review of Systems   Constitutional:  Negative for chills and fever.   HENT:  Negative for congestion and sore throat.    Respiratory:  Negative for cough and shortness of breath.    Cardiovascular:  Negative for chest pain.   Gastrointestinal:  Negative for abdominal pain, nausea and vomiting.   Genitourinary:   Negative for dysuria.   Musculoskeletal:  Negative for back pain.        Left knee pain   Skin:  Negative for wound.   Neurological:  Negative for dizziness and headaches.   Psychiatric/Behavioral:  Negative for confusion.    All other systems reviewed and are negative.         All systems reviewed and negative except for those discussed in HPI.       PHYSICAL EXAM    I have reviewed the triage vital signs and nursing notes.    ED Triage Vitals [11/09/24 2040]   Temp Heart Rate Resp BP SpO2   98 °F (36.7 °C) 77 18 164/98 97 %      Temp src Heart Rate Source Patient Position BP Location FiO2 (%)   Oral Monitor Sitting Left arm --       Physical Exam  Vitals and nursing note reviewed.   Constitutional:       General: He is not in acute distress.     Appearance: Normal appearance. He is not ill-appearing, toxic-appearing or diaphoretic.   HENT:      Head: Normocephalic and atraumatic.      Right Ear: External ear normal.      Left Ear: External ear normal.      Nose: No congestion or rhinorrhea.      Mouth/Throat:      Mouth: Mucous membranes are moist.      Pharynx: Oropharynx is clear.   Eyes:      Conjunctiva/sclera: Conjunctivae normal.   Cardiovascular:      Rate and Rhythm: Normal rate.      Heart sounds: Normal heart sounds.   Pulmonary:      Effort: Pulmonary effort is normal. No respiratory distress.      Breath sounds: Normal breath sounds. No wheezing.   Abdominal:      Tenderness: There is no abdominal tenderness.   Musculoskeletal:      Cervical back: Normal range of motion and neck supple.      Left knee: Swelling and bony tenderness present. No deformity. Tenderness present over the patellar tendon.      Right lower leg: No edema.      Left lower leg: No edema.   Skin:     Findings: No rash.   Neurological:      Mental Status: He is alert.             LAB RESULTS  No results found for this or any previous visit (from the past 24 hours).    If labs were ordered, I independently reviewed the results and  considered them in treating the patient.        RADIOLOGY  XR Knee 3+ View With [place] Left    Result Date: 11/10/2024  PROCEDURE: XR KNEE 3+ VW W SUNRISE LEFT-  HISTORY: fall  FINDINGS:  Four views show a small calcification at the lateral tibial spine. A small avulsion fracture is not excluded. Mild degenerative changes are noted. Prepatellar soft tissue swelling is noted. No foreign body is identified.      Small calcification adjacent to the lateral tibial spine, a small avulsion fracture is not excluded.  Mild degenerative change  Prepatellar soft tissue swelling.        This report was signed and finalized on 11/10/2024 7:15 AM by Sunny Ayala MD.       I ordered and independently reviewed the above noted radiographic studies.      I viewed images of left knee x-ray which showed no obvious fracture per my independent interpretation.    See radiologist's dictation for official interpretation.        PROCEDURES    Procedures    No orders to display       MEDICATIONS GIVEN IN ER    Medications   oxyCODONE-acetaminophen (PERCOCET) 5-325 MG per tablet 1 tablet (1 tablet Oral Given 11/9/24 2113)   ondansetron ODT (ZOFRAN-ODT) disintegrating tablet 4 mg (4 mg Oral Given 11/9/24 2113)         MEDICAL DECISION MAKING, PROGRESS, and CONSULTS    All labs, if obtained, have been independently reviewed by me.  All radiology studies, if obtained, have been reviewed by me and the radiologist dictating the report.  All EKG's, if obtained, have been independently viewed and interpreted by me/my attending physician.      Discussion below represents my analysis of pertinent findings related to patient's condition, differential diagnosis, treatment plan and final disposition.    Patient is a 54-year-old male presented to ER after a fall onto his knee, no head injury or loss of consciousness, his pain is confined to the left knee, on exam the left lower extremity is neurovascularly intact, there is significant swelling over  the left knee, no erythema or warmth and no overlying abrasions or lacerations.  He has full active and passive range of motion of the knee and he can bear weight and walk with only a mild limp due to pain.  Plan for x-ray of left knee, patient given Percocet and Zofran in the ED.  X-ray per my independent interpretation showed no obvious fracture.  Patient was given a hinged knee brace and advised to follow-up with orthopedic surgery for further evaluation.  Patient prescribed meloxicam and Robaxin for pain.  Patient verbalized understanding of and agreement with this plan of care.                     Differential diagnosis:    Differential diagnosis included but was not limited to fall, fracture, contusion, sprain, dislocation, ligamentous injury      Additional sources:    - Discussed/ obtained information from independent historians: Patient's wife at bedside in addition to patient    - External (non-ED) record review: None    - Chronic or social conditions impacting care: None    Orders placed during this visit:  Orders Placed This Encounter   Procedures    [facility] 04.  Hinged Knee Brace    XR Knee 3+ View With [place] Left    Ambulatory Referral to Orthopedic Surgery    Obtain & Apply The Following- Lower extremity; Hinged knee brace         Additional orders considered but not ordered: None      ED Course:    Consultants: None                Shared Decision Making:  After my consideration of clinical presentation and any laboratory/radiology studies obtained, I discussed the findings with the patient/patient representative who is in agreement with the treatment plan and the final disposition.   Risks and benefits of discharge and/or observation/admission were discussed.       AS OF 16:31 EST VITALS:    BP - 164/98  HR - 77  TEMP - 98 °F (36.7 °C) (Oral)  O2 SATS - 97%                  DIAGNOSIS  Final diagnoses:   Injury of left knee, initial encounter         DISPOSITION  Discharge  home      Please note that portions of this document were completed with voice recognition software.      Eric Lerma PA-C  11/10/24 [id number]

## 2024-11-12 DIAGNOSIS — S89.92XA INJURY OF LEFT KNEE, INITIAL ENCOUNTER: Primary | ICD-10-CM

## 2024-11-13 ENCOUNTER — OFFICE VISIT (OUTPATIENT)
Dept: ORTHOPEDIC SURGERY | Facility: CLINIC | Age: 54
End: 2024-11-13
Payer: MEDICARE

## 2024-11-13 VITALS
SYSTOLIC BLOOD PRESSURE: 140 MMHG | WEIGHT: 272.8 LBS | BODY MASS INDEX: 36.15 KG/M2 | TEMPERATURE: 96.9 F | DIASTOLIC BLOOD PRESSURE: 90 MMHG | HEIGHT: 73 IN

## 2024-11-13 DIAGNOSIS — S89.92XA INJURY OF LEFT KNEE, INITIAL ENCOUNTER: ICD-10-CM

## 2024-11-13 DIAGNOSIS — S80.02XA CONTUSION OF LEFT KNEE, INITIAL ENCOUNTER: Primary | ICD-10-CM

## 2024-12-02 ENCOUNTER — OFFICE VISIT (OUTPATIENT)
Dept: ORTHOPEDIC SURGERY | Facility: CLINIC | Age: 54
End: 2024-12-02
Payer: MEDICARE

## 2024-12-02 ENCOUNTER — HOSPITAL ENCOUNTER (EMERGENCY)
Facility: HOSPITAL | Age: 54
Discharge: HOME | End: 2024-12-02
Attending: STUDENT IN AN ORGANIZED HEALTH CARE EDUCATION/TRAINING PROGRAM | Admitting: STUDENT IN AN ORGANIZED HEALTH CARE EDUCATION/TRAINING PROGRAM
Payer: MEDICARE

## 2024-12-02 ENCOUNTER — APPOINTMENT (OUTPATIENT)
Dept: GENERAL RADIOLOGY | Facility: HOSPITAL | Age: 54
End: 2024-12-02
Payer: MEDICARE

## 2024-12-02 VITALS
RESPIRATION RATE: 20 BRPM | HEART RATE: 76 BPM | HEIGHT: 73 IN | DIASTOLIC BLOOD PRESSURE: 101 MMHG | OXYGEN SATURATION: 97 % | BODY MASS INDEX: 35.25 KG/M2 | TEMPERATURE: 98.1 F | WEIGHT: 266 LBS | SYSTOLIC BLOOD PRESSURE: 149 MMHG

## 2024-12-02 VITALS
WEIGHT: 268 LBS | HEIGHT: 73 IN | BODY MASS INDEX: 35.52 KG/M2 | SYSTOLIC BLOOD PRESSURE: 154 MMHG | TEMPERATURE: 98.2 F | DIASTOLIC BLOOD PRESSURE: 102 MMHG

## 2024-12-02 DIAGNOSIS — S52.601A CLOSED FRACTURE OF DISTAL ENDS OF RIGHT RADIUS AND ULNA, INITIAL ENCOUNTER: Primary | ICD-10-CM

## 2024-12-02 DIAGNOSIS — S52.571A OTHER CLOSED INTRA-ARTICULAR FRACTURE OF DISTAL END OF RIGHT RADIUS, INITIAL ENCOUNTER: Primary | ICD-10-CM

## 2024-12-02 DIAGNOSIS — S52.611A CLOSED DISPLACED FRACTURE OF STYLOID PROCESS OF RIGHT ULNA, INITIAL ENCOUNTER: ICD-10-CM

## 2024-12-02 DIAGNOSIS — S52.501A CLOSED FRACTURE OF DISTAL ENDS OF RIGHT RADIUS AND ULNA, INITIAL ENCOUNTER: Primary | ICD-10-CM

## 2024-12-02 DIAGNOSIS — S30.0XXA CONTUSION OF SACRUM, INITIAL ENCOUNTER: ICD-10-CM

## 2024-12-02 LAB
ALBUMIN SERPL-MCNC: 3.8 G/DL (ref 3.5–5.2)
ALBUMIN/GLOB SERPL: 1.2 G/DL
ALP SERPL-CCNC: 149 U/L (ref 39–117)
ALT SERPL W P-5'-P-CCNC: 127 U/L (ref 1–41)
ANION GAP SERPL CALCULATED.3IONS-SCNC: 15.2 MMOL/L (ref 5–15)
AST SERPL-CCNC: 90 U/L (ref 1–40)
BASOPHILS # BLD AUTO: 0.03 10*3/MM3 (ref 0–0.2)
BASOPHILS NFR BLD AUTO: 0.6 % (ref 0–1.5)
BILIRUB SERPL-MCNC: 1 MG/DL (ref 0–1.2)
BUN SERPL-MCNC: 11 MG/DL (ref 6–20)
BUN/CREAT SERPL: 13.9 (ref 7–25)
CALCIUM SPEC-SCNC: 8.9 MG/DL (ref 8.6–10.5)
CHLORIDE SERPL-SCNC: 103 MMOL/L (ref 98–107)
CO2 SERPL-SCNC: 18.8 MMOL/L (ref 22–29)
CREAT SERPL-MCNC: 0.79 MG/DL (ref 0.76–1.27)
DEPRECATED RDW RBC AUTO: 44.6 FL (ref 37–54)
EGFRCR SERPLBLD CKD-EPI 2021: 104.3 ML/MIN/1.73
EOSINOPHIL # BLD AUTO: 0.06 10*3/MM3 (ref 0–0.4)
EOSINOPHIL NFR BLD AUTO: 1.2 % (ref 0.3–6.2)
ERYTHROCYTE [DISTWIDTH] IN BLOOD BY AUTOMATED COUNT: 13.6 % (ref 12.3–15.4)
GLOBULIN UR ELPH-MCNC: 3.2 GM/DL
GLUCOSE SERPL-MCNC: 300 MG/DL (ref 65–99)
HCT VFR BLD AUTO: 47.7 % (ref 37.5–51)
HGB BLD-MCNC: 16.4 G/DL (ref 13–17.7)
IMM GRANULOCYTES # BLD AUTO: 0.01 10*3/MM3 (ref 0–0.05)
IMM GRANULOCYTES NFR BLD AUTO: 0.2 % (ref 0–0.5)
LYMPHOCYTES # BLD AUTO: 1.14 10*3/MM3 (ref 0.7–3.1)
LYMPHOCYTES NFR BLD AUTO: 22.2 % (ref 19.6–45.3)
MCH RBC QN AUTO: 30.6 PG (ref 26.6–33)
MCHC RBC AUTO-ENTMCNC: 34.4 G/DL (ref 31.5–35.7)
MCV RBC AUTO: 89 FL (ref 79–97)
MONOCYTES # BLD AUTO: 0.22 10*3/MM3 (ref 0.1–0.9)
MONOCYTES NFR BLD AUTO: 4.3 % (ref 5–12)
NEUTROPHILS NFR BLD AUTO: 3.67 10*3/MM3 (ref 1.7–7)
NEUTROPHILS NFR BLD AUTO: 71.5 % (ref 42.7–76)
NRBC BLD AUTO-RTO: 0 /100 WBC (ref 0–0.2)
PLATELET # BLD AUTO: 134 10*3/MM3 (ref 140–450)
PMV BLD AUTO: 10.8 FL (ref 6–12)
POTASSIUM SERPL-SCNC: 4 MMOL/L (ref 3.5–5.2)
PROT SERPL-MCNC: 7 G/DL (ref 6–8.5)
RBC # BLD AUTO: 5.36 10*6/MM3 (ref 4.14–5.8)
SODIUM SERPL-SCNC: 137 MMOL/L (ref 136–145)
WBC NRBC COR # BLD AUTO: 5.13 10*3/MM3 (ref 3.4–10.8)

## 2024-12-02 PROCEDURE — 99283 EMERGENCY DEPT VISIT LOW MDM: CPT | Performed by: STUDENT IN AN ORGANIZED HEALTH CARE EDUCATION/TRAINING PROGRAM

## 2024-12-02 PROCEDURE — 96372 THER/PROPH/DIAG INJ SC/IM: CPT

## 2024-12-02 PROCEDURE — 72220 X-RAY EXAM SACRUM TAILBONE: CPT

## 2024-12-02 PROCEDURE — 73110 X-RAY EXAM OF WRIST: CPT

## 2024-12-02 PROCEDURE — 73090 X-RAY EXAM OF FOREARM: CPT

## 2024-12-02 PROCEDURE — 80053 COMPREHEN METABOLIC PANEL: CPT | Performed by: NURSE PRACTITIONER

## 2024-12-02 PROCEDURE — 72170 X-RAY EXAM OF PELVIS: CPT

## 2024-12-02 PROCEDURE — 99213 OFFICE O/P EST LOW 20 MIN: CPT | Performed by: STUDENT IN AN ORGANIZED HEALTH CARE EDUCATION/TRAINING PROGRAM

## 2024-12-02 PROCEDURE — 25010000002 KETOROLAC TROMETHAMINE PER 15 MG: Performed by: NURSE PRACTITIONER

## 2024-12-02 PROCEDURE — 29105 APPLICATION LONG ARM SPLINT: CPT | Performed by: STUDENT IN AN ORGANIZED HEALTH CARE EDUCATION/TRAINING PROGRAM

## 2024-12-02 PROCEDURE — 85025 COMPLETE CBC W/AUTO DIFF WBC: CPT | Performed by: NURSE PRACTITIONER

## 2024-12-02 RX ORDER — OXYCODONE HYDROCHLORIDE 5 MG/1
5 TABLET ORAL EVERY 4 HOURS PRN
Qty: 10 TABLET | Refills: 0 | Status: SHIPPED | OUTPATIENT
Start: 2024-12-02 | End: 2024-12-06 | Stop reason: SINTOL

## 2024-12-02 RX ORDER — KETOROLAC TROMETHAMINE 30 MG/ML
15 INJECTION, SOLUTION INTRAMUSCULAR; INTRAVENOUS ONCE
Status: COMPLETED | OUTPATIENT
Start: 2024-12-02 | End: 2024-12-02

## 2024-12-02 RX ADMIN — KETOROLAC TROMETHAMINE 15 MG: 30 INJECTION, SOLUTION INTRAMUSCULAR; INTRAVENOUS at 12:18

## 2024-12-02 NOTE — PROGRESS NOTES
Office Note     Name: Kb Minaya    : 1970     MRN: [id number]     Chief Complaint  Fracture and Injury of the Right Wrist (Working in his garage this morning, states his left knee gave out and he FOOSH. Went to [facility] ER. Placed in splint and sling. )    Subjective     History of Present Illness:  Kb Minaya is a 54 y.o. male presenting today for evaluation of acute right wrist fracture that occurred today on 2024.  Patient states that while he was moving a deep freeze in his garage when he fell backwards onto his outstretched right hand.  He was seen at the emergency department today after the injury where x-rays revealed a fracture.  He was placed in a volar wrist splint, shoulder sling, and given orthopedic follow-up.  Patient denies any other significant injuries at this time.  Anecdotally he notes that his left knee is doing very well and denies any pain at this time.  He states that his left knee has been relatively pain-free for the past 2 weeks.  Patient denies previous injury to the affected area.  He states his fingers feel somewhat cold but he denies numbness and tingling in his right hand.  Denies proximal forearm, elbow, or shoulder pain.    Review of Systems   Constitutional:  Negative for fever.   HENT:  Negative for dental problem and voice change.    Eyes:  Negative for visual disturbance.   Respiratory:  Negative for shortness of breath.    Cardiovascular:  Negative for chest pain.   Gastrointestinal:  Negative for abdominal pain.   Genitourinary:  Negative for dysuria.   Musculoskeletal:  Positive for arthralgias and joint swelling. Negative for gait problem.   Skin:  Negative for rash.   Neurological:  Negative for speech difficulty.   Hematological:  Does not bruise/bleed easily.   Psychiatric/Behavioral:  Negative for confusion.         Past Medical History:   Diagnosis Date    Ankle fracture, left     Anxiety     Anxiety and depression     Asthma     Back pain      "Blood clot in vein 01/2021    right leg-states has resolved now    Cirrhosis     CKD (chronic kidney disease)     pt denies 7/6/21    Colon polyp     Coronary artery disease     Depression     Diabetes mellitus     Esophageal varices 003/01/2022    Fatty liver     GERD (gastroesophageal reflux disease)     H/O echocardiogram 02/2021    History of nuclear stress test 01/2020    \"I did ok with it\"    Hyperlipidemia     Hypertension     Kidney stone     Murmur     Myocardial infarction     MI IN 2006 (stent x 1), 2012 (no stents), 8/2/2020 (no stents with last MI).  Just saw Dr. Duenas recently for a f/u.  (assessed 11/22/21)    Pancreatitis 03/22    Pilonidal cyst     Plantar fasciitis     bilateral    Seasonal allergies     Sleep apnea     CPAP-\"I don't wear it every night\"    Urinary frequency     Wears glasses     Wrist fracture, left         Past Surgical History:   Procedure Laterality Date    APPENDECTOMY      CARDIAC CATHETERIZATION  2006    stent x 1    CARDIAC CATHETERIZATION  2012    no stents    CHOLECYSTECTOMY  08/23/2021    COLONOSCOPY  05/31/2024    COLONOSCOPY N/A 11/23/2021    Procedure: COLONOSCOPY with biopsies;  Surgeon: Yaneth Bell MD;  Location: [facility] ENDOSCOPY;  Service: Gastroenterology;  Laterality: N/A;    CYST REMOVAL      lower part of spine.    ENDOSCOPY      ENDOSCOPY N/A 07/07/2021    Procedure: ESOPHAGOGASTRODUODENOSCOPY WITH BOPSIES;  Surgeon: Arjun Delgadillo MD;  Location: [facility] ENDOSCOPY;  Service: Gastroenterology;  Laterality: N/A;    ENDOSCOPY W/ BANDING N/A 03/17/2022    Procedure: ESOPHAGOGASTRODUODENOSCOPY WITH Biopsy;  Surgeon: Yaneth Bell MD;  Location: [facility] ENDOSCOPY;  Service: Gastroenterology;  Laterality: N/A;    LIPOMA EXCISION      LIVER BIOPSY  8/23/21    PLANTAR FASCIA RELEASE      right    ROTATOR CUFF REPAIR Right 08/03/2022    SKIN BIOPSY      benign    UPPER GASTROINTESTINAL ENDOSCOPY         Family History   Problem Relation Age of Onset " "   Diabetes Mother     COPD Mother     Heart failure Mother     Heart disease Father     Lung cancer Father     Pancreatic cancer Paternal Aunt     Colon cancer Paternal Aunt        Social History     Socioeconomic History    Marital status:    Tobacco Use    Smoking status: Never    Smokeless tobacco: Never   Vaping Use    Vaping status: Never Used   Substance and Sexual Activity    Alcohol use: Not Currently    Drug use: No    Sexual activity: Yes     Partners: Female     Birth control/protection: None         Current Outpatient Medications:     Continuous Blood Gluc Sensor (Dexcom G6 Sensor), , Disp: , Rfl:     Continuous Blood Gluc Transmit (Dexcom G6 Transmitter) misc, , Disp: , Rfl:     Dulaglutide (Trulicity) 1.5 MG/0.5ML solution pen-injector, Inject 1.5 mg under the skin into the appropriate area as directed., Disp: , Rfl:     nitroglycerin (NITROSTAT) 0.4 MG SL tablet, Place 1 tablet under the tongue., Disp: , Rfl:     oxyCODONE (Roxicodone) 5 MG immediate release tablet, Take 1 tablet by mouth Every 4 (Four) Hours As Needed for Moderate Pain for up to 10 doses., Disp: 10 tablet, Rfl: 0  No current facility-administered medications for this visit.    Allergies   Allergen Reactions    Morphine And Codeine Anaphylaxis    Simvastatin Other (See Comments)     Liver problems    Clopidogrel Hives    Penicillins Hives     Beta lactam allergy details  Antibiotic reaction: hives  Age at reaction: child  Dose to reaction time: (!) hours  Reason for antibiotic: sore throat  Epinephrine required for reaction?: unknown  Tolerated antibiotics: augmentin, amoxicillin        Pradaxa [Dabigatran Etexilate Mesylate] Hives           Objective   BP (!) 154/102 Comment: patient in a lot of pain, just came from ER  Temp 98.2 °F (36.8 °C)   Ht 185.4 cm (73\")   Wt 122 kg (268 lb)   BMI 35.36 kg/m²            Physical Exam  Right Hand Exam     Comments:  Patient is able to flex and extend all fingers though this is " painful.  The patient's splint was maintained during exam today due to severe pain.  Able to do thumbs up and crossover sign.  Cap refill brisk to each digit, gross sensation intact to light touch.  No tenderness in elbow or proximal forearm.           Extremity DVT signs are negative by clinical screen.     Independent Review of Radiographic Studies:    Reviewed images and agree with radiologist interpretation.    XR Wrist 3+ View Right  Order date: 12/2/2024  Authorizing: Keith Irizarry MD  Ordered by Keith Irizarry MD on 12/2/2024.     Narrative & Impression    PROCEDURE: XR WRIST 3+ VW RIGHT-     HISTORY: Pain after fall     FINDINGS: A three view exam demonstrates mildly comminuted, essentially  nondisplaced distal right radial fracture. Minimally displaced fracture  through the base of the right ulnar styloid identified. Mild soft tissue  swelling noted. No other fracture identified.     IMPRESSION:  Acute fracture as above.        This report was signed and finalized on 12/2/2024 12:25 PM by Gail Squires MD.       Procedures    Assessment and Plan   Diagnoses and all orders for this visit:    1. Other closed intra-articular fracture of distal end of right radius, initial encounter (Primary)    2. Closed displaced fracture of styloid process of right ulna, initial encounter       Discussion of orthopedic goals  Orthopedic activities reviewed and patient expressed appreciation  Regular exercise as tolerated  Risk, benefits, and merits of treatment alternatives reviewed with the patient and questions answered  Patient guided on mobility and conditioning exercises  Ice, heat, and/or modalities as beneficial  Elevate hand for residual swelling  Take prescribed medications as instructed only as tolerated  Reduced physical activity as appropriate and avoid offending activity  Weight bearing parameters reviewed  Use brace as instructed    Recommendations/Plan:  Exercise, medications, injections, other patient  advice, and return appointment as noted.  Patient is encouraged to call or return for any issues or concerns.    Patient's volar wrist splint was removed and he was placed in an Ortho-Glass sugar-tong splint.  He was also provided a new shoulder sling today with phyllis.  Advise no use of the affected extremity.  Will follow-up later this week once swelling has reduced for cast placement.    Return in about 4 days (around 12/6/2024) for XOA.  Patient agreeable to call or return sooner for any concerns.

## 2024-12-02 NOTE — ED PROVIDER NOTES
"Pt Name: Kb Minaya  MRN: [id number]  : 1970  Date of Encounter: 2024    PCP: Vilma Cassidy MD      Subjective    History of Present Illness:    Chief Complaint: Pain after fall in wrist and tailbone    History of Present Illness: Kb Minaya is a 54 y.o. male who presents to the ER complaining of pain after taking a fall that started proximately 1045 this morning.  Patient states that he has a history of falling due to uncontrolled diabetic neuropathy.  He states that this time when he fell his legs gave out from under him and he fell onto concrete where he landed on his wrist and tailbone.  He believes that he broke his left arm due to severe pain.  He denies hitting his head or any loss of consciousness.  Patient states that he is getting very nauseated from the extreme pain in his wrist . pain is described as Sharp, Throbbing, Constant, and does not radiate  Patient rates pain as a 10 on a ten scale.    Triage Vitals:    ED Triage Vitals [24 1059]   Temp Heart Rate Resp BP SpO2   97.6 °F (36.4 °C) 73 20 139/89 100 %      Temp src Heart Rate Source Patient Position BP Location FiO2 (%)   Oral Monitor -- Left arm --       Nurses Notes reviewed and agree, including vitals, allergies, social history and prior medical history.     Morphine and codeine, Simvastatin, Clopidogrel, Penicillins, and Pradaxa [dabigatran etexilate mesylate]    Past Medical History:   Diagnosis Date    Ankle fracture, left     Anxiety     Anxiety and depression     Asthma     Back pain     Blood clot in vein 2021    right leg-states has resolved now    Cirrhosis     CKD (chronic kidney disease)     pt denies 21    Colon polyp     Coronary artery disease     Depression     Diabetes mellitus     Esophageal varices     Fatty liver     GERD (gastroesophageal reflux disease)     H/O echocardiogram 2021    History of nuclear stress test 2020    \"I did ok with it\"    Hyperlipidemia " "    Hypertension     Kidney stone     Murmur     Myocardial infarction     MI IN 2006 (stent x 1), 2012 (no stents), 8/2/2020 (no stents with last MI).  Just saw Dr. Duenas recently for a f/u.  (assessed 11/22/21)    Pancreatitis 03/22    Pilonidal cyst     Plantar fasciitis     bilateral    Seasonal allergies     Sleep apnea     CPAP-\"I don't wear it every night\"    Urinary frequency     Wears glasses     Wrist fracture, left        Past Surgical History:   Procedure Laterality Date    APPENDECTOMY      CARDIAC CATHETERIZATION  2006    stent x 1    CARDIAC CATHETERIZATION  2012    no stents    CHOLECYSTECTOMY  08/23/2021    COLONOSCOPY  05/31/2024    COLONOSCOPY N/A 11/23/2021    Procedure: COLONOSCOPY with biopsies;  Surgeon: Yaneth Bell MD;  Location: [facility] ENDOSCOPY;  Service: Gastroenterology;  Laterality: N/A;    CYST REMOVAL      lower part of spine.    ENDOSCOPY      ENDOSCOPY N/A 07/07/2021    Procedure: ESOPHAGOGASTRODUODENOSCOPY WITH BOPSIES;  Surgeon: Arjun Delgadillo MD;  Location: [facility] ENDOSCOPY;  Service: Gastroenterology;  Laterality: N/A;    ENDOSCOPY W/ BANDING N/A 03/17/2022    Procedure: ESOPHAGOGASTRODUODENOSCOPY WITH Biopsy;  Surgeon: Yaneth Bell MD;  Location: [facility] ENDOSCOPY;  Service: Gastroenterology;  Laterality: N/A;    LIPOMA EXCISION      LIVER BIOPSY  8/23/21    PLANTAR FASCIA RELEASE      right    ROTATOR CUFF REPAIR Right 08/03/2022    SKIN BIOPSY      benign    UPPER GASTROINTESTINAL ENDOSCOPY         Social History     Socioeconomic History    Marital status:    Tobacco Use    Smoking status: Never    Smokeless tobacco: Never   Vaping Use    Vaping status: Never Used   Substance and Sexual Activity    Alcohol use: Not Currently    Drug use: No    Sexual activity: Yes     Partners: Female     Birth control/protection: None       Family History   Problem Relation Age of Onset    Diabetes Mother     COPD Mother     Heart failure Mother     Heart disease " Father     Lung cancer Father     Pancreatic cancer Paternal Aunt     Colon cancer Paternal Aunt        REVIEW OF SYSTEMS:     All systems reviewed and not pertinent unless noted.    Review of Systems   Gastrointestinal:  Positive for nausea.   Musculoskeletal:  Positive for joint swelling and myalgias.   All other systems reviewed and are negative.      Objective    Physical Exam  Vitals and nursing note reviewed.   Constitutional:       Appearance: Normal appearance.   HENT:      Head: Normocephalic and atraumatic.   Eyes:      Extraocular Movements: Extraocular movements intact.      Pupils: Pupils are equal, round, and reactive to light.   Cardiovascular:      Rate and Rhythm: Normal rate and regular rhythm.      Pulses: Normal pulses.      Heart sounds: Normal heart sounds.   Pulmonary:      Effort: Pulmonary effort is normal.      Breath sounds: Normal breath sounds.   Abdominal:      General: Bowel sounds are normal.      Palpations: Abdomen is soft.   Musculoskeletal:         General: Tenderness and signs of injury present. Normal range of motion.      Right forearm: Swelling and tenderness present.      Cervical back: Normal range of motion and neck supple.   Skin:     General: Skin is warm and dry.      Capillary Refill: Capillary refill takes less than 2 seconds.   Neurological:      Mental Status: He is alert.      GCS: GCS eye subscore is 4. GCS verbal subscore is 5. GCS motor subscore is 6.      Sensory: Sensation is intact.      Motor: Motor function is intact.   Psychiatric:         Attention and Perception: Attention and perception normal.         Mood and Affect: Mood and affect normal.         Speech: Speech normal.                         Splint - Cast - Strapping    Date/Time: 12/2/2024 12:53 PM    Performed by: Nilo Ramsay APRN  Authorized by: Keith Irizarry MD    Consent:     Consent obtained:  Verbal    Consent given by:  Patient    Risks, benefits, and alternatives were discussed: yes       Risks discussed:  Discoloration, numbness, pain and swelling    Alternatives discussed:  No treatment, delayed treatment, alternative treatment, observation and referral  Universal protocol:     Procedure explained and questions answered to patient or proxy's satisfaction: yes      Relevant documents present and verified: yes      Imaging studies available: yes      Site/side marked: yes      Immediately prior to procedure a time out was called: yes      Patient identity confirmed:  Verbally with patient and arm band  Pre-procedure details:     Distal neurologic exam:  Normal    Distal perfusion: distal pulses strong    Procedure details:     Location:  Wrist    Wrist location:  R wrist    Strapping: no      Cast type:  Short arm    Splint type:  Wrist and volar short arm    Supplies:  Cotton padding, sling, fiberglass and elastic bandage    Attestation: Splint applied and adjusted personally by me    Post-procedure details:     Distal neurologic exam:  Normal    Distal perfusion: distal pulses strong      Procedure completion:  Tolerated    Post-procedure imaging: not applicable        ED Course:    No orders to display            Orders placed during this visit:    Orders Placed This Encounter   Procedures    Splint Cast Strapping    [facility] 02.  Shoulder Immobilizer/Sling    XR Forearm 2 View Right    XR Sacrum & Coccyx    XR Pelvis 1 or 2 View    XR Wrist 3+ View Right    Comprehensive Metabolic Panel    CBC Auto Differential    Ambulatory Referral to Orthopedic Surgery    CBC & Differential       LAB Results:    Lab Results (last 24 hours)       Procedure Component Value Units Date/Time    CBC & Differential [[id number]]  (Abnormal) Collected: 12/02/24 1200    Specimen: Blood Updated: 12/02/24 1220    Narrative:      The following orders were created for panel order CBC & Differential.  Procedure                               Abnormality         Status                     ---------                                -----------         ------                     CBC Auto Differential[[id number]]        Abnormal            Final result               Scan Slide[[id number]]                                                                    Please view results for these tests on the individual orders.    Comprehensive Metabolic Panel [[id number]]  (Abnormal) Collected: 12/02/24 1200    Specimen: Blood Updated: 12/02/24 1241     Glucose 300 mg/dL      Comment: Glucose >180, Hemoglobin A1C recommended.        BUN 11 mg/dL      Creatinine 0.79 mg/dL      Sodium 137 mmol/L      Potassium 4.0 mmol/L      Chloride 103 mmol/L      CO2 18.8 mmol/L      Calcium 8.9 mg/dL      Total Protein 7.0 g/dL      Albumin 3.8 g/dL      ALT (SGPT) 127 U/L      AST (SGOT) 90 U/L      Alkaline Phosphatase 149 U/L      Total Bilirubin 1.0 mg/dL      Globulin 3.2 gm/dL      A/G Ratio 1.2 g/dL      BUN/Creatinine Ratio 13.9     Anion Gap 15.2 mmol/L      eGFR 104.3 mL/min/1.73     Narrative:      GFR Normal >60  Chronic Kidney Disease <60  Kidney Failure <15      CBC Auto Differential [[id number]]  (Abnormal) Collected: 12/02/24 1200    Specimen: Blood Updated: 12/02/24 1220     WBC 5.13 10*3/mm3      RBC 5.36 10*6/mm3      Hemoglobin 16.4 g/dL      Hematocrit 47.7 %      MCV 89.0 fL      MCH 30.6 pg      MCHC 34.4 g/dL      RDW 13.6 %      RDW-SD 44.6 fl      MPV 10.8 fL      Platelets 134 10*3/mm3      Neutrophil % 71.5 %      Lymphocyte % 22.2 %      Monocyte % 4.3 %      Eosinophil % 1.2 %      Basophil % 0.6 %      Immature Grans % 0.2 %      Neutrophils, Absolute 3.67 10*3/mm3      Lymphocytes, Absolute 1.14 10*3/mm3      Monocytes, Absolute 0.22 10*3/mm3      Eosinophils, Absolute 0.06 10*3/mm3      Basophils, Absolute 0.03 10*3/mm3      Immature Grans, Absolute 0.01 10*3/mm3      nRBC 0.0 /100 WBC              If labs were ordered, I have independently reviewed the results and considered them in the diagnosis and treatment plan for the  patient    RADIOLOGY    XR Wrist 3+ View Right    Result Date: 12/2/2024  PROCEDURE: XR WRIST 3+ VW RIGHT-  HISTORY: Pain after fall  FINDINGS: A three view exam demonstrates mildly comminuted, essentially nondisplaced distal right radial fracture. Minimally displaced fracture through the base of the right ulnar styloid identified. Mild soft tissue swelling noted. No other fracture identified.      Impression: Acute fracture as above.      This report was signed and finalized on 12/2/2024 12:25 PM by Gail Squires MD.      XR Sacrum & Coccyx    Result Date: 12/2/2024  SACRUM/COCCYX  HISTORY: Low back pain after fall today..  COMPARISON: None.  FINDINGS: There is no acute fracture or dislocation. The sacral arches are intact. The SI joints appear intact. There is no acute soft tissue abnormality identified. Metallic pelvic surgical clip noted. Mild degenerative change of the SI joints noted.      Impression: No acute osseous abnormality.    This report was signed and finalized on 12/2/2024 12:24 PM by Gail Squires MD.      XR Forearm 2 View Right    Result Date: 12/2/2024  PROCEDURE: XR FOREARM 2 VW RIGHT-  HISTORY: Pain after fall  FINDINGS: A 2 view exam demonstrates comminuted, essentially nondisplaced fracture involving the distal radius. There is a transverse component involving the distal right radial metaphysis. There is also linear extension to the articular surface. There is a minimally displaced fracture involving the base of the ulnar styloid. Mild soft tissue swelling noted..  No other fracture identified.      Impression: Acute fracture as above.        This report was signed and finalized on 12/2/2024 12:18 PM by Gail Squires MD.      XR Pelvis 1 or 2 View    Result Date: 12/2/2024  PROCEDURE: XR PELVIS 1 OR 2 VW-  CLINICAL HISTORY: Fall today, right-sided buttock pain.  FINDINGS: A single AP view of the pelvis was obtained. No fractures or dislocations are identified. The sacroiliac joints are patent.  The hip joint spaces are well maintained bilaterally.      Impression: No acute bony abnormality.    This report was signed and finalized on 12/2/2024 12:18 PM by Gail Squires MD.        If I have ordered, I have independently reviewed the above noted radiographic studies.  Please see the radiologist dictation for the official interpretation    Medications given to patient in the ER    Medications   ketorolac (TORADOL) injection 15 mg (15 mg Intramuscular Given 12/2/24 1218)       AS OF 12:55 EST VITALS:    BP - (!) 149/101  HR - 76  TEMP - 98.1 °F (36.7 °C) (Oral)  O2 SATS - 97%         Shared Decision Making: After my consideration of the clinical presentation and laboratory/radiology studies obtained, I have discussed the findings with the patient/patient representative who is in agreement with the treatment plan and final disposition. Risks and benefits of discharge and/or observation admission were discussed.  Final disposition of the patient will be discharged home.  Patient is requested to follow-up with primary care provider and specialist in 1 week following final discharge.      Medical Decision Making  Kb Minaya is a 54 y.o. male who presents to the ER complaining of pain after taking a fall that started proximately 1045 this morning.  Patient states that he has a history of falling due to uncontrolled diabetic neuropathy.  He states that this time when he fell his legs gave out from under him and he fell onto concrete where he landed on his wrist and tailbone.  He believes that he broke his left arm due to severe pain.  He denies hitting his head or any loss of consciousness.  Patient states that he is getting very nauseated from the extreme pain in his wrist . pain is described as Sharp, Throbbing, Constant, and does not radiate  Patient rates pain as a 10 on a ten scale.    DDX: includes but is not limited to: Wrist contusion, wrist fracture, wrist pain, contusion of the sacrum, fracture of the  sacrum    Problems Addressed:  Closed fracture of distal ends of right radius and ulna, initial encounter: complicated acute illness or injury  Contusion of sacrum, initial encounter: complicated acute illness or injury    Amount and/or Complexity of Data Reviewed  Labs: ordered. Decision-making details documented in ED Course.     Details: I have personally reviewed and documented all results  Radiology: ordered. Decision-making details documented in ED Course.  Discussion of management or test interpretation with external provider(s): Discussed assessment, treatment and plan with ER attending    Risk  Prescription drug management.  Risk Details: I have discussed with patient the finding of the test preformed today. Patient has been diagnosed with closed fracture of the distal ends of the right radius and ulna, contusion of the sacrum and will be discharged home. Advised on return precautions the importance of close follow-up with primary care provider.  Strict return precautions have been given and patient verbalizes understanding        Final diagnoses:   Closed fracture of distal ends of right radius and ulna, initial encounter   Contusion of sacrum, initial encounter       Please note that portions of this document were completed using voice recognition dictation software.       Nilo Ramsay, APRN  12/02/24 [id number]

## 2024-12-05 DIAGNOSIS — S52.571A OTHER CLOSED INTRA-ARTICULAR FRACTURE OF DISTAL END OF RIGHT RADIUS, INITIAL ENCOUNTER: Primary | ICD-10-CM

## 2024-12-05 DIAGNOSIS — S52.611A CLOSED DISPLACED FRACTURE OF STYLOID PROCESS OF RIGHT ULNA, INITIAL ENCOUNTER: ICD-10-CM

## 2024-12-06 ENCOUNTER — OFFICE VISIT (OUTPATIENT)
Dept: ORTHOPEDIC SURGERY | Facility: CLINIC | Age: 54
End: 2024-12-06
Payer: MEDICARE

## 2024-12-06 VITALS
HEIGHT: 73 IN | WEIGHT: 269.4 LBS | DIASTOLIC BLOOD PRESSURE: 90 MMHG | TEMPERATURE: 97.8 F | SYSTOLIC BLOOD PRESSURE: 142 MMHG | BODY MASS INDEX: 35.7 KG/M2

## 2024-12-06 DIAGNOSIS — M79.641 HAND PAIN, RIGHT: ICD-10-CM

## 2024-12-06 DIAGNOSIS — S52.571D OTHER CLOSED INTRA-ARTICULAR FRACTURE OF DISTAL END OF RIGHT RADIUS WITH ROUTINE HEALING, SUBSEQUENT ENCOUNTER: Primary | ICD-10-CM

## 2024-12-06 DIAGNOSIS — S52.611D CLOSED DISPLACED FRACTURE OF STYLOID PROCESS OF RIGHT ULNA WITH ROUTINE HEALING, SUBSEQUENT ENCOUNTER: ICD-10-CM

## 2024-12-06 NOTE — PROGRESS NOTES
Office Note     Name: Kb Minaya    : 1970     MRN: [id number]     Chief Complaint  Follow-up and Fracture of the Right Wrist (Follow up right wrist fracture, DOI: 24. He states he woke up with pain in right ring finger knuckle on Wednesday, 24. Patient states he stopped taking Percocet due to itching of right arm and chest. He started taking Motrin for pain on 24. )    Subjective     History of Present Illness:  Kb Minaya is a 54 y.o. male presenting today for follow-up for right wrist fracture.  Patient reports good compliance with the splint.  He does admit to new symptoms of pain just above the dorsal right hand just proximal to the third and fourth metacarpals that began after waking on 2024.  He denies any paresthesias at this time.  He denies any other new or worsening symptoms.    Review of Systems   Constitutional:  Negative for fever.   HENT:  Negative for dental problem and voice change.    Eyes:  Negative for visual disturbance.   Respiratory:  Negative for shortness of breath.    Cardiovascular:  Negative for chest pain.   Gastrointestinal:  Negative for abdominal pain.   Genitourinary:  Negative for dysuria.   Musculoskeletal:  Positive for arthralgias. Negative for gait problem and joint swelling.   Skin:  Negative for rash.   Neurological:  Negative for speech difficulty.   Hematological:  Does not bruise/bleed easily.   Psychiatric/Behavioral:  Negative for confusion.         Past Medical History:   Diagnosis Date    Ankle fracture, left     Anxiety     Anxiety and depression     Asthma     Back pain     Blood clot in vein 2021    right leg-states has resolved now    Cirrhosis     CKD (chronic kidney disease)     pt denies 21    Colon polyp     Coronary artery disease     Depression     Diabetes mellitus     Esophageal varices     Fatty liver     GERD (gastroesophageal reflux disease)     H/O echocardiogram 2021    History of nuclear  "stress test 01/2020    \"I did ok with it\"    Hyperlipidemia     Hypertension     Kidney stone     Murmur     Myocardial infarction     MI IN 2006 (stent x 1), 2012 (no stents), 8/2/2020 (no stents with last MI).  Just saw Dr. Duenas recently for a f/u.  (assessed 11/22/21)    Pancreatitis 03/22    Pilonidal cyst     Plantar fasciitis     bilateral    Seasonal allergies     Sleep apnea     CPAP-\"I don't wear it every night\"    Urinary frequency     Wears glasses     Wrist fracture, left         Past Surgical History:   Procedure Laterality Date    APPENDECTOMY      CARDIAC CATHETERIZATION  2006    stent x 1    CARDIAC CATHETERIZATION  2012    no stents    CHOLECYSTECTOMY  08/23/2021    COLONOSCOPY  05/31/2024    COLONOSCOPY N/A 11/23/2021    Procedure: COLONOSCOPY with biopsies;  Surgeon: Yaneth Bell MD;  Location: [facility] ENDOSCOPY;  Service: Gastroenterology;  Laterality: N/A;    CYST REMOVAL      lower part of spine.    ENDOSCOPY      ENDOSCOPY N/A 07/07/2021    Procedure: ESOPHAGOGASTRODUODENOSCOPY WITH BOPSIES;  Surgeon: Arjun Delgadillo MD;  Location: [facility] ENDOSCOPY;  Service: Gastroenterology;  Laterality: N/A;    ENDOSCOPY W/ BANDING N/A 03/17/2022    Procedure: ESOPHAGOGASTRODUODENOSCOPY WITH Biopsy;  Surgeon: Yaneth Bell MD;  Location: [facility] ENDOSCOPY;  Service: Gastroenterology;  Laterality: N/A;    LIPOMA EXCISION      LIVER BIOPSY  8/23/21    PLANTAR FASCIA RELEASE      right    ROTATOR CUFF REPAIR Right 08/03/2022    SKIN BIOPSY      benign    UPPER GASTROINTESTINAL ENDOSCOPY         Family History   Problem Relation Age of Onset    Diabetes Mother     COPD Mother     Heart failure Mother     Heart disease Father     Lung cancer Father     Pancreatic cancer Paternal Aunt     Colon cancer Paternal Aunt        Social History     Socioeconomic History    Marital status:    Tobacco Use    Smoking status: Never    Smokeless tobacco: Never   Vaping Use    Vaping status: Never " "Used   Substance and Sexual Activity    Alcohol use: Not Currently    Drug use: No    Sexual activity: Yes     Partners: Female     Birth control/protection: None         Current Outpatient Medications:     Continuous Blood Gluc Sensor (Dexcom G6 Sensor), , Disp: , Rfl:     Continuous Blood Gluc Transmit (Dexcom G6 Transmitter) misc, , Disp: , Rfl:     Dulaglutide (Trulicity) 1.5 MG/0.5ML solution pen-injector, Inject 1.5 mg under the skin into the appropriate area as directed., Disp: , Rfl:     nitroglycerin (NITROSTAT) 0.4 MG SL tablet, Place 1 tablet under the tongue., Disp: , Rfl:     Allergies   Allergen Reactions    Morphine And Codeine Anaphylaxis    Simvastatin Other (See Comments)     Liver problems    Warfarin Hives and Itching    Percocet [Oxycodone-Acetaminophen] Itching    Penicillins Hives     Beta lactam allergy details  Antibiotic reaction: hives  Age at reaction: child  Dose to reaction time: (!) hours  Reason for antibiotic: sore throat  Epinephrine required for reaction?: unknown  Tolerated antibiotics: augmentin, amoxicillin        Pradaxa [Dabigatran Etexilate Mesylate] Hives           Objective   /90   Temp 97.8 °F (36.6 °C)   Ht 185.4 cm (73\")   Wt 122 kg (269 lb 6.4 oz)   BMI 35.54 kg/m²            Physical Exam  Right Hand Exam     Comments:  Soft tissue swelling noted throughout the wrist.  No new bruising erythema, no new open wounds.  There is tenderness of the dorsal hand just proximal to the third and fourth MCPs.  There is no focal swelling or bruising in this area.  Range of motion and strength not evaluated today due to concurrent fracture.  Gross sensation intact light touch throughout the wrist and hand, cap refill brisk to each digit.           Extremity DVT signs are negative by clinical screen.     Independent Review of Radiographic Studies:    Three-view plain firms of the right wrist were done in office today for the evaluation of fracture healing, comparison views " available.  There is an essentially nondisplaced three-part fracture of the distal radius.  There is extension into the radiocarpal joint though there is no significant articular step-off.  There is also a minimally displaced fracture through the base of the right ulnar styloid.  No significant interval changes from previous exam.  Splint overlay does slightly obscure the image.    Procedures    Assessment and Plan   Diagnoses and all orders for this visit:    1. Other closed intra-articular fracture of distal end of right radius with routine healing, subsequent encounter (Primary)    2. Closed displaced fracture of styloid process of right ulna with routine healing, subsequent encounter    3. Hand pain, right, proximal third and fourth MCP       Discussion of orthopedic goals  Orthopedic activities reviewed and patient expressed appreciation  Regular exercise as tolerated  Risk, benefits, and merits of treatment alternatives reviewed with the patient and questions answered  Patient guided on mobility and conditioning exercises  Elevate hand for residual swelling  Reduced physical activity as appropriate and avoid offending activity  Weight bearing parameters reviewed  Cast care and proper usage discussed with patient.  No use of right upper extremity.    Recommendations/Plan:  Exercise, medications, injections, other patient advice, and return appointment as noted.  Patient is encouraged to call or return for any issues or concerns.    Patient was transitioned from his sugar-tong splint into a fiberglass long-arm cast with thumb spica.  We will continue long-arm cast for approximately 3 weeks and then possibly transition into a short arm cast with thumb spica.  We will continue to monitor weekly to assess for fracture fragment movement and articular step-off.  Advise follow-up with me in 1 week for repeat x-rays.    Return in about 1 week (around 12/13/2024) for XOA.  Patient agreeable to call or return sooner for  any concerns.

## 2024-12-12 DIAGNOSIS — S52.571D OTHER CLOSED INTRA-ARTICULAR FRACTURE OF DISTAL END OF RIGHT RADIUS WITH ROUTINE HEALING, SUBSEQUENT ENCOUNTER: Primary | ICD-10-CM

## 2024-12-13 ENCOUNTER — OFFICE VISIT (OUTPATIENT)
Dept: ORTHOPEDIC SURGERY | Facility: CLINIC | Age: 54
End: 2024-12-13
Payer: MEDICARE

## 2024-12-13 VITALS
SYSTOLIC BLOOD PRESSURE: 173 MMHG | BODY MASS INDEX: 35.65 KG/M2 | WEIGHT: 269 LBS | HEIGHT: 73 IN | DIASTOLIC BLOOD PRESSURE: 101 MMHG

## 2024-12-13 DIAGNOSIS — S52.611D CLOSED DISPLACED FRACTURE OF STYLOID PROCESS OF RIGHT ULNA WITH ROUTINE HEALING, SUBSEQUENT ENCOUNTER: ICD-10-CM

## 2024-12-13 DIAGNOSIS — S52.571D OTHER CLOSED INTRA-ARTICULAR FRACTURE OF DISTAL END OF RIGHT RADIUS WITH ROUTINE HEALING, SUBSEQUENT ENCOUNTER: Primary | ICD-10-CM

## 2024-12-13 NOTE — PROGRESS NOTES
"    Office Note     Name: Kb Minaya    : 1970     MRN: [id number]     Chief Complaint  Follow-up and Fracture of the Right Wrist (Follow up right wrist fracture, DOI: 24. States he has been in a lot of pain for about four days, the cast has felt tight and has been hurting him. He believes the swelling is better but his arm is still swollen.)    Subjective     History of Present Illness:  Kb Minaya is a 54 y.o. male presenting today for 1 week fracture follow-up.  Patient states he has been having increased pain for the past 4 days.  He states the cast is too tight and has been hurting him and digging into his skin.  Other than increased pain secondary to the cast, he denies any new or worsening symptoms.    Review of Systems     Past Medical History:   Diagnosis Date    Ankle fracture, left     Anxiety     Anxiety and depression     Asthma     Back pain     Blood clot in vein 2021    right leg-states has resolved now    Cirrhosis     CKD (chronic kidney disease)     pt denies 21    Colon polyp     Coronary artery disease     Depression     Diabetes mellitus     Esophageal varices     Fatty liver     GERD (gastroesophageal reflux disease)     H/O echocardiogram 2021    History of nuclear stress test 2020    \"I did ok with it\"    Hyperlipidemia     Hypertension     Kidney stone     Murmur     Myocardial infarction     MI IN  (stent x 1),  (no stents), 2020 (no stents with last MI).  Just saw Dr. Duenas recently for a f/u.  (assessed 21)    Pancreatitis     Pilonidal cyst     Plantar fasciitis     bilateral    Seasonal allergies     Sleep apnea     CPAP-\"I don't wear it every night\"    Urinary frequency     Wears glasses     Wrist fracture, left         Past Surgical History:   Procedure Laterality Date    APPENDECTOMY      CARDIAC CATHETERIZATION  2006    stent x 1    CARDIAC CATHETERIZATION  2012    no stents    CHOLECYSTECTOMY  2021    " COLONOSCOPY  05/31/2024    COLONOSCOPY N/A 11/23/2021    Procedure: COLONOSCOPY with biopsies;  Surgeon: Yaneth Bell MD;  Location: [facility] ENDOSCOPY;  Service: Gastroenterology;  Laterality: N/A;    CYST REMOVAL      lower part of spine.    ENDOSCOPY      ENDOSCOPY N/A 07/07/2021    Procedure: ESOPHAGOGASTRODUODENOSCOPY WITH BOPSIES;  Surgeon: Arjun Delgadillo MD;  Location: [facility] ENDOSCOPY;  Service: Gastroenterology;  Laterality: N/A;    ENDOSCOPY W/ BANDING N/A 03/17/2022    Procedure: ESOPHAGOGASTRODUODENOSCOPY WITH Biopsy;  Surgeon: Yaneth Bell MD;  Location: [facility] ENDOSCOPY;  Service: Gastroenterology;  Laterality: N/A;    LIPOMA EXCISION      LIVER BIOPSY  8/23/21    PLANTAR FASCIA RELEASE      right    ROTATOR CUFF REPAIR Right 08/03/2022    SKIN BIOPSY      benign    UPPER GASTROINTESTINAL ENDOSCOPY         Family History   Problem Relation Age of Onset    Diabetes Mother     COPD Mother     Heart failure Mother     Heart disease Father     Lung cancer Father     Pancreatic cancer Paternal Aunt     Colon cancer Paternal Aunt        Social History     Socioeconomic History    Marital status:    Tobacco Use    Smoking status: Never    Smokeless tobacco: Never   Vaping Use    Vaping status: Never Used   Substance and Sexual Activity    Alcohol use: Not Currently    Drug use: No    Sexual activity: Yes     Partners: Female     Birth control/protection: None         Current Outpatient Medications:     Continuous Blood Gluc Sensor (Dexcom G6 Sensor), , Disp: , Rfl:     Continuous Blood Gluc Transmit (Dexcom G6 Transmitter) misc, , Disp: , Rfl:     Dulaglutide (Trulicity) 1.5 MG/0.5ML solution pen-injector, Inject 1.5 mg under the skin into the appropriate area as directed., Disp: , Rfl:     nitroglycerin (NITROSTAT) 0.4 MG SL tablet, Place 1 tablet under the tongue., Disp: , Rfl:     Allergies   Allergen Reactions    Morphine And Codeine Anaphylaxis    Simvastatin Other (See  "Comments)     Liver problems    Warfarin Hives and Itching    Percocet [Oxycodone-Acetaminophen] Itching    Penicillins Hives     Beta lactam allergy details  Antibiotic reaction: hives  Age at reaction: child  Dose to reaction time: (!) hours  Reason for antibiotic: sore throat  Epinephrine required for reaction?: unknown  Tolerated antibiotics: augmentin, amoxicillin        Pradaxa [Dabigatran Etexilate Mesylate] Hives           Objective   BP (!) 173/101   Ht 185.4 cm (72.99\")   Wt 122 kg (269 lb)   BMI 35.50 kg/m²            Physical Exam  Right Hand Exam     Comments:  The patient's long-arm cast was removed today and the wrist was examined.  There is soft tissue swelling noted throughout the wrist.  No new bruising erythema, no new open wounds.  There is tenderness of the dorsal hand just proximal to the third and fourth MCPs.  There is no focal swelling or bruising in this area.  Range of motion and strength not evaluated today due to concurrent fracture.  Gross sensation intact light touch throughout the wrist and hand, cap refill brisk to each digit.           Extremity DVT signs are negative by clinical screen.     Independent Review of Radiographic Studies:    Three-view plain films of the right wrist were done in office today for the evaluation of fracture healing, comparison views available.  There is an essentially nondisplaced three-part fracture of the distal radius.  There is extension into the radiocarpal joint though there is no significant articular step-off.  There is also a minimally displaced fracture through the base of the right ulnar styloid.  No significant interval changes from previous exam.     Procedures    Assessment and Plan   Diagnoses and all orders for this visit:    1. Other closed intra-articular fracture of distal end of right radius with routine healing, subsequent encounter (Primary)    2. Closed displaced fracture of styloid process of right ulna with routine healing, " subsequent encounter       Discussion of orthopedic goals  Orthopedic activities reviewed and patient expressed appreciation  Regular exercise as tolerated  Risk, benefits, and merits of treatment alternatives reviewed with the patient and questions answered  Patient guided on mobility and conditioning exercises  Elevate hand for residual swelling  Weight bearing parameters reviewed    Recommendations/Plan:  Exercise, medications, injections, other patient advice, and return appointment as noted.  Patient is encouraged to call or return for any issues or concerns.    The patient's fiberglass long-arm cast was removed and a new fiberglass short arm cast with thumb spica was placed.  I believe short arm cast with thumb spica is appropriate at this time given that there has been no movement of the fracture over the past couple of weeks.  I would prefer long-arm cast though I believe patient will continue to do poorly with this so he was transitioned to a short arm cast.  I did advise follow-up in 1 week for repeat x-rays and exam to ensure that there is no fracture movement given the intra-articular extension of his fracture.    Return in about 1 week (around 12/20/2024) for XOA.  Patient agreeable to call or return sooner for any concerns.

## 2024-12-16 DIAGNOSIS — S52.571D OTHER CLOSED INTRA-ARTICULAR FRACTURE OF DISTAL END OF RIGHT RADIUS WITH ROUTINE HEALING, SUBSEQUENT ENCOUNTER: Primary | ICD-10-CM

## 2024-12-19 ENCOUNTER — OFFICE VISIT (OUTPATIENT)
Dept: ORTHOPEDIC SURGERY | Facility: CLINIC | Age: 54
End: 2024-12-19
Payer: MEDICARE

## 2024-12-19 VITALS
HEIGHT: 73 IN | DIASTOLIC BLOOD PRESSURE: 92 MMHG | BODY MASS INDEX: 35.65 KG/M2 | SYSTOLIC BLOOD PRESSURE: 168 MMHG | WEIGHT: 269 LBS

## 2024-12-19 DIAGNOSIS — S52.571D OTHER CLOSED INTRA-ARTICULAR FRACTURE OF DISTAL END OF RIGHT RADIUS WITH ROUTINE HEALING, SUBSEQUENT ENCOUNTER: Primary | ICD-10-CM

## 2024-12-19 DIAGNOSIS — S52.611D CLOSED DISPLACED FRACTURE OF STYLOID PROCESS OF RIGHT ULNA WITH ROUTINE HEALING, SUBSEQUENT ENCOUNTER: ICD-10-CM

## 2024-12-19 RX ORDER — METRONIDAZOLE 500 MG/1
TABLET ORAL
COMMUNITY
Start: 2024-11-18

## 2024-12-19 NOTE — PROGRESS NOTES
"Subjective   Patient ID: Kb Minaya is a 54 y.o. right hand dominant male  Follow-up of the Right Wrist (Closed intra-articular fracture of distal end of right radius with routine healing, DOI: 12-2-24         History of Present Illness     Patient is following up for a scheduled follow-up visit to evaluate fracture healing of a right wrist closed intra-articular distal radius fracture and ulnar styloid fracture.  The original date of injury 12-2-24.  He has been immobilized in a fiberglass cast since his initial orthopedic consultation.  The cast is not causing any discomfort.  NO increased/ worsening pain.                                                Past Medical History:   Diagnosis Date    Ankle fracture, left     Anxiety     Anxiety and depression     Asthma     Back pain     Blood clot in vein 01/2021    right leg-states has resolved now    Cirrhosis     CKD (chronic kidney disease)     pt denies 7/6/21    Colon polyp     Coronary artery disease     Depression     Diabetes mellitus     Esophageal varices 003/01/2022    Fatty liver     GERD (gastroesophageal reflux disease)     H/O echocardiogram 02/2021    History of nuclear stress test 01/2020    \"I did ok with it\"    Hyperlipidemia     Hypertension     Kidney stone     Murmur     Myocardial infarction     MI IN 2006 (stent x 1), 2012 (no stents), 8/2/2020 (no stents with last MI).  Just saw Dr. Duenas recently for a f/u.  (assessed 11/22/21)    Pancreatitis 03/22    Pilonidal cyst     Plantar fasciitis     bilateral    Seasonal allergies     Sleep apnea     CPAP-\"I don't wear it every night\"    Urinary frequency     Wears glasses     Wrist fracture, left         Past Surgical History:   Procedure Laterality Date    APPENDECTOMY      CARDIAC CATHETERIZATION  2006    stent x 1    CARDIAC CATHETERIZATION  2012    no stents    CHOLECYSTECTOMY  08/23/2021    COLONOSCOPY  05/31/2024    COLONOSCOPY N/A 11/23/2021    Procedure: COLONOSCOPY with biopsies;  " Surgeon: Yaneth Bell MD;  Location: [facility] ENDOSCOPY;  Service: Gastroenterology;  Laterality: N/A;    CYST REMOVAL      lower part of spine.    ENDOSCOPY      ENDOSCOPY N/A 07/07/2021    Procedure: ESOPHAGOGASTRODUODENOSCOPY WITH BOPSIES;  Surgeon: Arjun Delgadillo MD;  Location: [facility] ENDOSCOPY;  Service: Gastroenterology;  Laterality: N/A;    ENDOSCOPY W/ BANDING N/A 03/17/2022    Procedure: ESOPHAGOGASTRODUODENOSCOPY WITH Biopsy;  Surgeon: Yaneth Bell MD;  Location: [facility] ENDOSCOPY;  Service: Gastroenterology;  Laterality: N/A;    LIPOMA EXCISION      LIVER BIOPSY  8/23/21    PLANTAR FASCIA RELEASE      right    ROTATOR CUFF REPAIR Right 08/03/2022    SKIN BIOPSY      benign    UPPER GASTROINTESTINAL ENDOSCOPY         Family History   Problem Relation Age of Onset    Diabetes Mother     COPD Mother     Heart failure Mother     Heart disease Father     Lung cancer Father     Pancreatic cancer Paternal Aunt     Colon cancer Paternal Aunt        Social History     Socioeconomic History    Marital status:    Tobacco Use    Smoking status: Never    Smokeless tobacco: Never   Vaping Use    Vaping status: Never Used   Substance and Sexual Activity    Alcohol use: Not Currently    Drug use: No    Sexual activity: Yes     Partners: Female     Birth control/protection: None         Current Outpatient Medications:     metroNIDAZOLE (FLAGYL) 500 MG tablet, TAKE ONE (1) TABLET BY MOUTH EVERY EIGHT (8) HOURS, Disp: , Rfl:     Continuous Blood Gluc Sensor (Dexcom G6 Sensor), , Disp: , Rfl:     Continuous Blood Gluc Transmit (Dexcom G6 Transmitter) misc, , Disp: , Rfl:     Dulaglutide (Trulicity) 1.5 MG/0.5ML solution pen-injector, Inject 1.5 mg under the skin into the appropriate area as directed., Disp: , Rfl:     nitroglycerin (NITROSTAT) 0.4 MG SL tablet, Place 1 tablet under the tongue., Disp: , Rfl:     Allergies   Allergen Reactions    Morphine And Codeine Anaphylaxis    Simvastatin Other  "(See Comments)     Liver problems    Warfarin Hives and Itching    Percocet [Oxycodone-Acetaminophen] Itching    Penicillins Hives     Beta lactam allergy details  Antibiotic reaction: hives  Age at reaction: child  Dose to reaction time: (!) hours  Reason for antibiotic: sore throat  Epinephrine required for reaction?: unknown  Tolerated antibiotics: augmentin, amoxicillin        Pradaxa [Dabigatran Etexilate Mesylate] Hives       Review of Systems  See HPI        Objective   /92   Ht 185.4 cm (72.99\")   Wt 122 kg (269 lb)   BMI 35.50 kg/m²    Physical Exam  Ortho Exam      Neurological Exam       The right thumb spica fiberglass cast is in good position.  No loosening.  Patient is neurovascularly intact to the right upper extremity.    Assessment & Plan   Independent Review of Radiographic Studies:      X-ray of the right wrist 3 view performed in our clinic independently reviewed for the evaluation of distal radius and ulnar styloid fracture healing.  Comparison films are reviewed for comparison.  There has been no interval displacement.  No interval angulation appreciated.  There is no significant shortening of the distal radius.  Of note, fiberglass casting is noted overlying the wrist.    Procedures       Diagnoses and all orders for this visit:    1. Other closed intra-articular fracture of distal end of right radius with routine healing, subsequent encounter (Primary)    2. Closed displaced fracture of styloid process of right ulna with routine healing, subsequent encounter       Risk, benefits, and merits of treatment alternatives reviewed with the patient and questions answered  Reduced physical activity as appropriate and avoid offending activity  Weight bearing parameters reviewed    Recommendations/Plan:  Patient is encouraged to call or return for any issues or concerns.  Will continue current treatment with the thumb spica fiberglass cast.    Follow back up in 1 week- XOA  Patient agreeable " to call or return sooner for any concerns.

## 2024-12-23 DIAGNOSIS — S52.571D OTHER CLOSED INTRA-ARTICULAR FRACTURE OF DISTAL END OF RIGHT RADIUS WITH ROUTINE HEALING, SUBSEQUENT ENCOUNTER: Primary | ICD-10-CM

## 2024-12-23 DIAGNOSIS — S52.611D CLOSED DISPLACED FRACTURE OF STYLOID PROCESS OF RIGHT ULNA WITH ROUTINE HEALING, SUBSEQUENT ENCOUNTER: ICD-10-CM

## 2024-12-30 ENCOUNTER — OFFICE VISIT (OUTPATIENT)
Dept: ORTHOPEDIC SURGERY | Facility: CLINIC | Age: 54
End: 2024-12-30
Payer: MEDICARE

## 2024-12-30 VITALS
WEIGHT: 269 LBS | DIASTOLIC BLOOD PRESSURE: 104 MMHG | HEIGHT: 73 IN | BODY MASS INDEX: 35.65 KG/M2 | SYSTOLIC BLOOD PRESSURE: 166 MMHG

## 2024-12-30 DIAGNOSIS — S52.611D CLOSED DISPLACED FRACTURE OF STYLOID PROCESS OF RIGHT ULNA WITH ROUTINE HEALING, SUBSEQUENT ENCOUNTER: ICD-10-CM

## 2024-12-30 DIAGNOSIS — S52.571D OTHER CLOSED INTRA-ARTICULAR FRACTURE OF DISTAL END OF RIGHT RADIUS WITH ROUTINE HEALING, SUBSEQUENT ENCOUNTER: Primary | ICD-10-CM

## 2024-12-30 NOTE — PROGRESS NOTES
Podiatric Surgery Consult Note    Consults     History Of Present Illness  Maikel is a 67 year old male presenting with osteomyelitis of the fifth metatarsal of the right foot.  History of diabetes mellitus with diabetic neuropathy with peripheral vascular disease status post angioplasty and previous amputation of partial fourth and fifth rays of the left foot.  Patient was seen in the wound program with increased redness and swelling of the right foot.  He has been following periodically with x-rays and MRI for his chronic osteomyelitis and has repeatedly refused any additional surgical intervention.  Patient has been previously treated with IV antibiotics on multiple occasions and sees ID.  The patient was sent for repeat MRI of the right foot.  This was done on 12/26/2024 at [facility].  I was out of town so Dr. Tavares, my associate, recommend he be admitted..  Patient seen by : NIKKI Ahn, and is currently on ceftriaxone and vancomycin.  Patient states he feels very poorly.    Past Medical History  Past Medical History:   Diagnosis Date    Anasarca 05/15/2020    Anemia 11/12/2020    Anxiety     Arthritis     CKD (chronic kidney disease) 05/15/2020    Congestive cardiac failure  (CMD)     Dyspnea on exertion 08/13/2019    Erectile dysfunction     Essential (primary) hypertension     Gastroesophageal reflux disease     Hyperlipidemia 07/31/2020    Hyperlipoproteinemia     Kidney stone 11/12/2019    MRSA (methicillin resistant Staphylococcus aureus)     foot, left  2020    Open wound     lt foot    Peripheral vascular disease due to secondary diabetes mellitus  (CMD) 07/08/2020    PVOD (pulmonary veno-occlusive disease)  (CMD) 11/12/2020    Severe sepsis (CMD) 07/08/2020    Sleep apnea     not using  CPAP   due to loss  teeth    Staph infection     left foot wound    Status post amputation of toe  (CMD) 07/31/2020    Transfusion of blood product refused for [facility]      Office Note     Name: Kb Minaya    : 1970     MRN: [id number]     Chief Complaint  Pain and Injury of the Left Knee (Patient fell on 24 at work. He went to [facility] ER, had x-rays done, given knee brace, referred to Ortho. )    Subjective     History of Present Illness:  Kb Minaya is a 54 y.o. male presenting today for evaluation of acute left anterior peripatellar pain after a mechanical fall onto the left knee On 2024.  Patient was seen at [facility] ER after the event where x-rays revealed no acute osseous abnormalities.  He was given a hinged knee brace, crutches and orthopedic follow-up.  Today he complains of pain at the inferior pole of the patella as well as the patellar tendon.  He also has some degree of pain in the medial joint line.  He states his symptoms have not significantly improved since the injury.  He is able to bear weight.  Is not using an assistive device at this time.  Patient works for [employer] as a .  He denies any previous injuries to his left knee.  Denies left chronic knee pain.  Denies paresthesias.  ER note reviewed, pertinent imaging reviewed.    Review of Systems   Constitutional:  Negative for fever.   HENT:  Negative for dental problem and voice change.    Eyes:  Negative for visual disturbance.   Respiratory:  Negative for shortness of breath.    Cardiovascular:  Negative for chest pain.   Gastrointestinal:  Negative for abdominal pain.   Genitourinary:  Negative for dysuria.   Musculoskeletal:  Positive for arthralgias, gait problem and joint swelling.   Skin:  Negative for rash.   Neurological:  Negative for speech difficulty.   Hematological:  Does not bruise/bleed easily.   Psychiatric/Behavioral:  Negative for confusion.         Past Medical History:   Diagnosis Date    Ankle fracture, left     Anxiety     Anxiety and depression     Asthma     Back pain     Blood clot in vein 2021    right leg-states has resolved  "now    Cirrhosis     CKD (chronic kidney disease)     pt denies 7/6/21    Colon polyp     Coronary artery disease     Depression     Diabetes mellitus     Esophageal varices 003/01/2022    Fatty liver     GERD (gastroesophageal reflux disease)     H/O echocardiogram 02/2021    History of nuclear stress test 01/2020    \"I did ok with it\"    Hyperlipidemia     Hypertension     Kidney stone     Murmur     Myocardial infarction     MI IN 2006 (stent x 1), 2012 (no stents), 8/2/2020 (no stents with last MI).  Just saw Dr. Duenas recently for a f/u.  (assessed 11/22/21)    Pancreatitis 03/22    Pilonidal cyst     Plantar fasciitis     bilateral    Seasonal allergies     Sleep apnea     CPAP-\"I don't wear it every night\"    Urinary frequency     Wears glasses     Wrist fracture, left         Past Surgical History:   Procedure Laterality Date    APPENDECTOMY      CARDIAC CATHETERIZATION  2006    stent x 1    CARDIAC CATHETERIZATION  2012    no stents    CHOLECYSTECTOMY  08/23/2021    COLONOSCOPY  05/31/2024    COLONOSCOPY N/A 11/23/2021    Procedure: COLONOSCOPY with biopsies;  Surgeon: Yaneth Bell MD;  Location: [facility] ENDOSCOPY;  Service: Gastroenterology;  Laterality: N/A;    CYST REMOVAL      lower part of spine.    ENDOSCOPY      ENDOSCOPY N/A 07/07/2021    Procedure: ESOPHAGOGASTRODUODENOSCOPY WITH BOPSIES;  Surgeon: Arjun Delgadillo MD;  Location: [facility] ENDOSCOPY;  Service: Gastroenterology;  Laterality: N/A;    ENDOSCOPY W/ BANDING N/A 03/17/2022    Procedure: ESOPHAGOGASTRODUODENOSCOPY WITH Biopsy;  Surgeon: Yaneth Bell MD;  Location: [facility] ENDOSCOPY;  Service: Gastroenterology;  Laterality: N/A;    LIPOMA EXCISION      LIVER BIOPSY  8/23/21    PLANTAR FASCIA RELEASE      right    ROTATOR CUFF REPAIR Right 08/03/2022    SKIN BIOPSY      benign    UPPER GASTROINTESTINAL ENDOSCOPY         Family History   Problem Relation Age of Onset    Diabetes Mother     COPD Mother     Heart failure Mother  " reason     Type 2 diabetes mellitus with hyperglycemia  (CMD) 04/11/2017        Surgical History  Past Surgical History:   Procedure Laterality Date    Kidney stone surgery      Toe amputation Left 07/2020    #3, 4, and 5th toe        Social History  Social History     Tobacco Use    Smoking status: Never    Smokeless tobacco: Never    Tobacco comments:     quit smoking 10-12 years ago   Vaping Use    Vaping status: never used   Substance Use Topics    Alcohol use: Yes     Alcohol/week: 1.0 standard drink of alcohol     Types: 1 Cans of beer per week    Drug use: Never       Family History    Family History   Problem Relation Age of Onset    Cancer Father     Coronary Artery Disease Brother     Liver Disease Brother     Cancer Paternal Aunt         Allergies  ALLERGIES:  Doxycycline and Sulfamethoxazole-trimethoprim    Medications  Medications Prior to Admission   Medication Sig Dispense Refill    clindamycin (CLEOCIN) 300 MG capsule Take 1 capsule by mouth 4 times daily for 10 days. 40 capsule 0    empagliflozin (JARDIANCE) 10 MG tablet Take 1 tablet by mouth daily (before breakfast). (Patient not taking: Reported on 12/28/2024) 30 tablet 3    torsemide (DEMADEX) 20 MG tablet Take 3 tablets by mouth 2 times daily. 540 tablet 3    metoPROLOL succinate (TOPROL-XL) 25 MG 24 hr tablet Take 1 tablet by mouth daily. 90 tablet 3    spironolactone (ALDACTONE) 25 MG tablet Take 1 tablet by mouth 2 times daily. 180 tablet 3    isosorbide mononitrate (IMDUR) 30 MG 24 hr tablet TAKE 1 TABLET BY MOUTH EVERY DAY 90 tablet 3    famotidine (PEPCID) 20 MG tablet TAKE 1 TABLET BY MOUTH DAILY 90 tablet 1    mupirocin (BACTROBAN) 2 % ointment Apply 1 application. topically in the morning and 1 application. in the evening. 22 g 3    insulin glargine (LANTUS) 100 UNIT/ML vial solution Inject 50 Units into the skin in the morning and 50 Units in the evening.      atorvastatin (LIPITOR) 20 MG tablet Take 20 mg by mouth daily. Unsure of  "   Heart disease Father     Lung cancer Father     Pancreatic cancer Paternal Aunt     Colon cancer Paternal Aunt        Social History     Socioeconomic History    Marital status:    Tobacco Use    Smoking status: Never    Smokeless tobacco: Never   Vaping Use    Vaping status: Never Used   Substance and Sexual Activity    Alcohol use: Not Currently    Drug use: No    Sexual activity: Yes     Partners: Female     Birth control/protection: None         Current Outpatient Medications:     Continuous Blood Gluc Sensor (Dexcom G6 Sensor), , Disp: , Rfl:     Continuous Blood Gluc Transmit (Dexcom G6 Transmitter) misc, , Disp: , Rfl:     Dulaglutide (Trulicity) 1.5 MG/0.5ML solution pen-injector, Inject 1.5 mg under the skin into the appropriate area as directed., Disp: , Rfl:     meloxicam (MOBIC) 7.5 MG tablet, Take 1 tablet by mouth Daily for 7 days., Disp: 7 tablet, Rfl: 0    nitroglycerin (NITROSTAT) 0.4 MG SL tablet, Place 1 tablet under the tongue., Disp: , Rfl:     Allergies   Allergen Reactions    Morphine And Codeine Anaphylaxis    Simvastatin Other (See Comments)     Liver problems    Clopidogrel Hives    Penicillins Hives     Beta lactam allergy details  Antibiotic reaction: hives  Age at reaction: child  Dose to reaction time: (!) hours  Reason for antibiotic: sore throat  Epinephrine required for reaction?: unknown  Tolerated antibiotics: augmentin, amoxicillin        Pradaxa [Dabigatran Etexilate Mesylate] Hives           Objective   /90   Temp 96.9 °F (36.1 °C)   Ht 185.4 cm (73\")   Wt 124 kg (272 lb 12.8 oz)   BMI 35.99 kg/m²            Physical Exam  Musculoskeletal:      Left knee: No effusion.      Instability Tests: Medial Jeff test positive. Lateral Jeff test negative.       Left Knee Exam     Range of Motion   The patient has normal left knee ROM.    Tests   Jeff:  Medial - positive Lateral - negative  Varus: negative Valgus: negative  Lachman:  Anterior - negative    " dosage      allopurinol (ZYLOPRIM) 100 MG tablet TAKE 1 TABLET BY MOUTH EVERY DAY (Patient taking differently: Take 100 mg by mouth as needed.) 30 tablet 3    insulin aspart (NovoLOG) 100 UNIT/ML injectable solution Inject 20 Units into the skin in the morning and 20 Units at noon and 20 Units in the evening. Inject before meals. W/sliding scale         Review of Systems:  Constitutional: Negative except as documented in history of present illness.  Eye: Negative except as documented in history of present illness.  Ear/Nose/Mouth/Throat: Negative except as documented in history of present illness.  Cardiovascular: Negative except as documented in history of present illness.  Respiratory: Negative except as documented in history of present illness.  Gastrointestinal: Negative except as documented in history of present illness.  Genitourinary: Negative except as documented in history of present illness.  Musculoskeletal: Negative except as documented in history of present illness.  Integumentary: Negative except as documented in history of present illness.  Hematology/Lymphatics: Negative except as documented in history of present illness.  Neurologic: Negative except as documented in history of present illness.  Endocrine: Negative except as documented in history of present illness.  Psychiatric: Negative except as documented in history of present illness.       Last Recorded Vitals  Blood pressure 131/82, pulse 78, temperature 97.3 °F (36.3 °C), temperature source Oral, resp. rate 16, height 6' (1.829 m), weight (!) 153 kg (337 lb 4.9 oz), SpO2 97%.    Physical Exam    General: Alert and oriented x 3, no acute distress  Cardiovascular: Pedal pulses: Dorsalis pedis is nonpalpable bilateral.  Posterior tibial is palpable but reduced on the right and nonpalpable on the left.    Muscle tone & strength: wnl bilaterally   Left foot s/p 3rd, 4th, 5th ray amputation and s/p split thickness skin graft site with hypkeratotic  Posterior - negative  Drawer:  Anterior - negative     Posterior - negative  Patellar apprehension: negative    Other   Erythema: absent  Sensation: normal  Pulse: present  Swelling: none  Effusion: no effusion present    Comments:  There is no significant swelling bruising erythema.  There are no open wounds and no gross deformity.  There is focal tenderness at the inferior patella and patellar tendon.  Patient has full range of motion in his knee, there is no pain with extension.  There is increased pain at end range flexion.  There is tenderness at the medial anterior joint line, medial Jeff positive for pain without click, no mechanical locking or other symptoms.  No significant ligamentous laxity.  Neurovascular checks within normal limits.           Extremity DVT signs are negative by clinical screen.     Independent Review of Radiographic Studies:    2 view plain films of the left knee were done in office today for the evaluation of pain and joint condition, comparison views available.  No obvious acute osseous abnormalities are seen.  A small calcification is seen adjacent to the lateral tibial spine which may represent degenerative changes and/or avulsion fracture.  There is a tiny osteophyte on the superior pole of the patella, there is also a wedge-shaped lucency in the inferior aspect of the patella that may represent minimally displaced fracture or calcific changes.  All joint compartments appear well-preserved.    Procedures    Assessment and Plan   Diagnoses and all orders for this visit:    1. Contusion of left knee, initial encounter (Primary)    2. Injury of left knee, initial encounter       Discussion of orthopedic goals  Orthopedic activities reviewed and patient expressed appreciation  Regular exercise as tolerated  Risk, benefits, and merits of treatment alternatives reviewed with the patient and questions answered  Patient guided on mobility and conditioning exercises  Ice, heat, and/or  tissue along the incision site.   There is  edema of the entire right  foot and lower extremity with venous insufficiency with venous stasis.   status post additional surgery on left foot with removal of base of fifth metatarsal and additional debridement with bone cultures and sensitivity.   The ulcer on the fifth metatarsal head plantar right foot has large amount of hyperkeratotic tissue and after debridement  there is an ulcer which is granular with very good bleeding with perfusion with no purulence with no cellulitis..  This ulcer probes deep approximately 1 cm and appears to communicate with the remaining metatarsal.  Previous ulcer on the dorsal aspect of the second digit on the dorsal aspect of the DIPJ is closed.    . Previous ulceration on the lateral aspect of the second metatarsal head and lateral amputation site is currently closed with no current signs of infection.  Mild surrounding hyperkeratotic rim chronic venous insufficiency with chronic venous stasis.  There is no cellulitis.   Patient has had previous amputation of digits 3, 4 and 5 on the left foot.    The previous traumatic ulcer on the anterior aspect of the left shin is now closed with no cellulitis with no current signs of infection.  This area is tender due to his increased edema secondary to congestive heart failure.  Patient also had multiple small ulcers on the anterior aspect of the right shin.  These have now all closed.  Previous ulcer on the amputation site lateral left foot is closed.     Neurological: Sensorium decreased bilaterally measured with monofilament 10 g of pressure    Labs     Admission on 12/28/2024   Component Date Value Ref Range Status    Extra Tube 12/28/2024 Hold for Add Ons   Final    Extra Tube 12/28/2024 Hold for Add Ons   Final    Extra Tube 12/28/2024 Hold for Add Ons   Final    Extra Tube 12/28/2024 Hold for Add Ons   Final    Sodium 12/28/2024 131 (L)  135 - 145 mmol/L Final    Potassium 12/28/2024 4.4   3.4 - 5.1 mmol/L Final    Chloride 12/28/2024 97  97 - 110 mmol/L Final    Carbon Dioxide 12/28/2024 29  21 - 32 mmol/L Final    Anion Gap 12/28/2024 9  7 - 19 mmol/L Final    Glucose 12/28/2024 178 (H)  70 - 99 mg/dL Final    BUN 12/28/2024 79 (H)  6 - 20 mg/dL Final    Creatinine 12/28/2024 3.19 (H)  0.67 - 1.17 mg/dL Final    Glomerular Filtration Rate 12/28/2024 20 (L)  >=60 Final    eGFR 15 - 29 mL/min/1.73 m2 = Severe decrease in kidney function. Stage 4 CKD (chronic kidney disease), or severe kidney disease. Estimated GFR calculated using the CKD-EPI-R (2021) equation that does not include race in the creatinine calculation.    BUN/Cr 12/28/2024 25  7 - 25 Final    Calcium 12/28/2024 9.5  8.4 - 10.2 mg/dL Final    Bilirubin, Total 12/28/2024 0.4  0.2 - 1.0 mg/dL Final    GOT/AST 12/28/2024 14  <=37 Units/L Final    GPT/ALT 12/28/2024 12  <64 Units/L Final    Alkaline Phosphatase 12/28/2024 79  45 - 117 Units/L Final    Albumin 12/28/2024 2.6 (L)  3.4 - 5.0 g/dL Final    Protein, Total 12/28/2024 9.2 (H)  6.4 - 8.2 g/dL Final    Globulin 12/28/2024 6.6 (H)  2.0 - 4.0 g/dL Final    A/G Ratio 12/28/2024 0.4 (L)  1.0 - 2.4 Final    WBC 12/28/2024 10.3  4.2 - 11.0 K/mcL Final    RBC 12/28/2024 3.72 (L)  4.50 - 5.90 mil/mcL Final    HGB 12/28/2024 10.5 (L)  13.0 - 17.0 g/dL Final    HCT 12/28/2024 33.0 (L)  39.0 - 51.0 % Final    MCV 12/28/2024 88.7  78.0 - 100.0 fl Final    MCH 12/28/2024 28.2  26.0 - 34.0 pg Final    MCHC 12/28/2024 31.8 (L)  32.0 - 36.5 g/dL Final    RDW-CV 12/28/2024 14.6  11.0 - 15.0 % Final    RDW-SD 12/28/2024 46.8  39.0 - 50.0 fL Final    PLT 12/28/2024 362  140 - 450 K/mcL Final    NRBC 12/28/2024 0  <=0 /100 WBC Final    Neutrophil, Percent 12/28/2024 71  % Final    Lymphocytes, Percent 12/28/2024 17  % Final    Mono, Percent 12/28/2024 9  % Final    Eosinophils, Percent 12/28/2024 2  % Final    Basophils, Percent 12/28/2024 1  % Final    Immature Granulocytes 12/28/2024 0  % Final     modalities as beneficial  Reduced physical activity as appropriate and avoid offending activity  Weight bearing parameters reviewed  Use brace as instructed  Assistive device encouraged for safety and support  Counseling on diet, nutrition, fitness exercise, and weight reduction goals  Home exercise plan provided    Recommendations/Plan:  Exercise, medications, injections, other patient advice, and return appointment as noted.  Brace: Knee ligament stabilizing brace.  Patient is encouraged to call or return for any issues or concerns.    At this time I am suspicious of a bone contusion to patella and perhaps patellar tendinitis.  A small avulsion fracture of the lateral tibial spine and possible fracture of the inferior patella is not ruled out at this time.  I recommended conservative treatment including RICE, over-the-counter analgesics, hinged knee brace, rest from aggravating activities, home exercise plan for 2 weeks.  If symptoms fail to improve advise follow-up with me for further evaluation.    Return in about 2 weeks (around 11/27/2024) for Recheck.  Patient agreeable to call or return sooner for any concerns.   Absolute Neutrophils 12/28/2024 7.2  1.8 - 7.7 K/mcL Final    Absolute Lymphocytes 12/28/2024 1.8  1.0 - 4.0 K/mcL Final    Absolute Monocytes 12/28/2024 0.9  0.3 - 0.9 K/mcL Final    Absolute Eosinophils  12/28/2024 0.3  0.0 - 0.5 K/mcL Final    Absolute Basophils 12/28/2024 0.1  0.0 - 0.3 K/mcL Final    Absolute Immature Granulocytes 12/28/2024 0.0  0.0 - 0.2 K/mcL Final    RBC Sedimentation Rate 12/28/2024 91 (H)  0 - 20 mm/hr Final    C-Reactive Protein 12/28/2024 47.3 (H)  <10.0 mg/L Final    Culture, Blood or Bone Marrow 12/28/2024 No Growth 1 Day.   Preliminary    Culture, Blood or Bone Marrow 12/28/2024 No Growth 1 Day.   Preliminary    Lactate, Venous 12/28/2024 0.7  0.0 - 2.0 mmol/L Final    GLUCOSE, BEDSIDE - POINT OF CARE 12/28/2024 157 (H)  70 - 99 mg/dL Final    Notified RN    Hemoglobin A1C 12/28/2024 7.9 (H)  4.5 - 5.6 % Final      Diabetic Screening  Non Diabetic:             <5.7%  Increased Risk:           5.7-6.4%  Diagnostic For Diabetes:  >6.4%    Diabetic Control  A1C%       eAG mg/dL  6.0            126  6.5            140  7.0            154  7.5            169  8.0            183  8.5            197  9.0            212  9.5            226  10.0           240    Sodium 12/29/2024 135  135 - 145 mmol/L Final    Potassium 12/29/2024 3.7  3.4 - 5.1 mmol/L Final    Chloride 12/29/2024 100  97 - 110 mmol/L Final    Carbon Dioxide 12/29/2024 29  21 - 32 mmol/L Final    Anion Gap 12/29/2024 10  7 - 19 mmol/L Final    Glucose 12/29/2024 129 (H)  70 - 99 mg/dL Final    BUN 12/29/2024 75 (H)  6 - 20 mg/dL Final    Creatinine 12/29/2024 2.82 (H)  0.67 - 1.17 mg/dL Final    Glomerular Filtration Rate 12/29/2024 23 (L)  >=60 Final    eGFR 15 - 29 mL/min/1.73 m2 = Severe decrease in kidney function. Stage 4 CKD (chronic kidney disease), or severe kidney disease. Estimated GFR calculated using the CKD-EPI-R (2021) equation that does not include race in the creatinine calculation.    BUN/Cr 12/29/2024 27  (H)  7 - 25 Final    Calcium 12/29/2024 9.4  8.4 - 10.2 mg/dL Final    Magnesium 12/29/2024 2.5 (H)  1.7 - 2.4 mg/dL Final    RBC Sedimentation Rate 12/29/2024 90 (H)  0 - 20 mm/hr Final    WBC 12/29/2024 9.2  4.2 - 11.0 K/mcL Final    RBC 12/29/2024 3.63 (L)  4.50 - 5.90 mil/mcL Final    HGB 12/29/2024 10.0 (L)  13.0 - 17.0 g/dL Final    HCT 12/29/2024 31.6 (L)  39.0 - 51.0 % Final    MCV 12/29/2024 87.1  78.0 - 100.0 fl Final    MCH 12/29/2024 27.5  26.0 - 34.0 pg Final    MCHC 12/29/2024 31.6 (L)  32.0 - 36.5 g/dL Final    RDW-CV 12/29/2024 14.6  11.0 - 15.0 % Final    RDW-SD 12/29/2024 46.5  39.0 - 50.0 fL Final    PLT 12/29/2024 351  140 - 450 K/mcL Final    NRBC 12/29/2024 0  <=0 /100 WBC Final    Neutrophil, Percent 12/29/2024 64  % Final    Lymphocytes, Percent 12/29/2024 22  % Final    Mono, Percent 12/29/2024 8  % Final    Eosinophils, Percent 12/29/2024 4  % Final    Basophils, Percent 12/29/2024 1  % Final    Immature Granulocytes 12/29/2024 1  % Final    Absolute Neutrophils 12/29/2024 6.0  1.8 - 7.7 K/mcL Final    Absolute Lymphocytes 12/29/2024 2.0  1.0 - 4.0 K/mcL Final    Absolute Monocytes 12/29/2024 0.7  0.3 - 0.9 K/mcL Final    Absolute Eosinophils  12/29/2024 0.3  0.0 - 0.5 K/mcL Final    Absolute Basophils 12/29/2024 0.1  0.0 - 0.3 K/mcL Final    Absolute Immature Granulocytes 12/29/2024 0.1  0.0 - 0.2 K/mcL Final    GLUCOSE, BEDSIDE - POINT OF CARE 12/28/2024 224 (H)  70 - 99 mg/dL Final    GLUCOSE, BEDSIDE - POINT OF CARE 12/29/2024 120 (H)  70 - 99 mg/dL Final    GLUCOSE, BEDSIDE - POINT OF CARE 12/29/2024 131 (H)  70 - 99 mg/dL Final    GLUCOSE, BEDSIDE - POINT OF CARE 12/29/2024 182 (H)  70 - 99 mg/dL Final    Notified RN    Sodium 12/30/2024 132 (L)  135 - 145 mmol/L Final    Potassium 12/30/2024 4.0  3.4 - 5.1 mmol/L Final    Slight hemolysis, result may be falsely increased.    Chloride 12/30/2024 99  97 - 110 mmol/L Final    Carbon Dioxide 12/30/2024 31  21 - 32 mmol/L Final    Anion  Gap 12/30/2024 6 (L)  7 - 19 mmol/L Final    Glucose 12/30/2024 105 (H)  70 - 99 mg/dL Final    BUN 12/30/2024 75 (H)  6 - 20 mg/dL Final    Creatinine 12/30/2024 2.81 (H)  0.67 - 1.17 mg/dL Final    Glomerular Filtration Rate 12/30/2024 24 (L)  >=60 Final    eGFR 15 - 29 mL/min/1.73 m2 = Severe decrease in kidney function. Stage 4 CKD (chronic kidney disease), or severe kidney disease. Estimated GFR calculated using the CKD-EPI-R (2021) equation that does not include race in the creatinine calculation.    BUN/Cr 12/30/2024 27 (H)  7 - 25 Final    Calcium 12/30/2024 9.3  8.4 - 10.2 mg/dL Final    WBC 12/30/2024 10.4  4.2 - 11.0 K/mcL Final    RBC 12/30/2024 3.73 (L)  4.50 - 5.90 mil/mcL Final    HGB 12/30/2024 10.6 (L)  13.0 - 17.0 g/dL Final    HCT 12/30/2024 32.9 (L)  39.0 - 51.0 % Final    MCV 12/30/2024 88.2  78.0 - 100.0 fl Final    MCH 12/30/2024 28.4  26.0 - 34.0 pg Final    MCHC 12/30/2024 32.2  32.0 - 36.5 g/dL Final    RDW-CV 12/30/2024 14.6  11.0 - 15.0 % Final    RDW-SD 12/30/2024 46.2  39.0 - 50.0 fL Final    PLT 12/30/2024 368  140 - 450 K/mcL Final    NRBC 12/30/2024 0  <=0 /100 WBC Final    Neutrophil, Percent 12/30/2024 66  % Final    Lymphocytes, Percent 12/30/2024 20  % Final    Mono, Percent 12/30/2024 8  % Final    Eosinophils, Percent 12/30/2024 4  % Final    Basophils, Percent 12/30/2024 1  % Final    Immature Granulocytes 12/30/2024 1  % Final    Absolute Neutrophils 12/30/2024 6.9  1.8 - 7.7 K/mcL Final    Absolute Lymphocytes 12/30/2024 2.1  1.0 - 4.0 K/mcL Final    Absolute Monocytes 12/30/2024 0.9  0.3 - 0.9 K/mcL Final    Absolute Eosinophils  12/30/2024 0.4  0.0 - 0.5 K/mcL Final    Absolute Basophils 12/30/2024 0.1  0.0 - 0.3 K/mcL Final    Absolute Immature Granulocytes 12/30/2024 0.1  0.0 - 0.2 K/mcL Final    GLUCOSE, BEDSIDE - POINT OF CARE 12/29/2024 172 (H)  70 - 99 mg/dL Final    Notified RN    GLUCOSE, BEDSIDE - POINT OF CARE 12/30/2024 108 (H)  70 - 99 mg/dL Final        Imaging          Impression:   1. Status post amputation of lesser toe, left (CMD)    2. Leg edema    3. Open wound of left foot, subsequent encounter    4. Type 2 diabetes mellitus with foot ulcer, with long-term current use of insulin (CMD)    5. Chronic osteomyelitis of right foot (CMD)    6. Amputation of left foot, sequela (CMD)    7. PVD (peripheral vascular disease) (CMD)    9.   status post surgery with removal of foreign body/staple with bone biopsy left foot  10.   ulceration lateral amputation site left foot  11.  Ulceration plantar aspect distal remaining fifth metatarsal left foot  12.  status post surgery with excision and biopsy of remaining fifth metatarsal left foot  13.  ulcer plantar aspect fifth metatarsal head right foot  14.  Osteomyelitis fifth metatarsal head right foot and base of fifth proximal phalanx right foot  15.  Ulcer anterior medial left ankle  16.  Ulcer dorsal second digit left foot  17.  Traumatic ulcer anterior left shin  18.  Congestive heart failure    Plan:   Most recent MRI from 12/26/2024 of the right foot without contrast no soft tissue ulceration involving the lateral forefoot with chronic osteomyelitis of the fifth metatarsal.  Appears to be loculated collection of fluid in the region of the soft tissue wound  involving the lateral forefoot measuring approximately 2.5 cm suspicious for an abscess.  There is cellulitis of the foot.  There is subluxation of the fifth ray metatarsal phalangeal joint.  Patient been seen by Dr. Ahn,  ID, and is currently on ceftriaxone and vancomycin.  Discussed with the patient that once again I recommend an incision and drainage with resection of the infected bone with partial fifth ray amputation.  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  We discussed the possible  risks and complications.  Discussed once again with the  patient and recommended incision and drainage and partial fifth ray amputation of the right foot.  Patient states that this will mess up his plans and would like to wait at least another day and see if the antibiotics provide enough relief.  Discussed with the patient that has had chronic osteomyelitis of the fifth metatarsal for over 2 years, and I recommend amputation of the partial fifth ray.  Will reevaluate tomorrow.    Ken Graff DPM  12/30/2024

## 2024-12-30 NOTE — PROGRESS NOTES
"    Office Note     Name: Kb Minaya    : 1970     MRN: [id number]     Chief Complaint  Follow-up of the Right Wrist (Closed intra-articular fracture of distal end of right radius with routine healing, DOI: )    Subjective     History of Present Illness:  Kb Minaya is a 54 y.o. male presenting today for right wrist follow-up.  Patient states his symptoms continue to improve though he did get his cast wet and is requesting it be removed today because it is still well.  Patient's cast was removed and he underwent x-rays in office today.  With the cast off he complains a short lasting stabbing pain in the area of the second dorsal compartment just proximal to the base of his thumb, exacerbated with any movement of the thumb.  He also continues to have pain in the area of the ulnar styloid.  He denies any dorsal or volar wrist pain.  Denies any paresthesias.    Review of Systems     Past Medical History:   Diagnosis Date    Ankle fracture, left     Anxiety     Anxiety and depression     Asthma     Back pain     Blood clot in vein 2021    right leg-states has resolved now    Cirrhosis     CKD (chronic kidney disease)     pt denies 21    Colon polyp     Coronary artery disease     Depression     Diabetes mellitus     Esophageal varices     Fatty liver     GERD (gastroesophageal reflux disease)     H/O echocardiogram 2021    History of nuclear stress test 2020    \"I did ok with it\"    Hyperlipidemia     Hypertension     Kidney stone     Murmur     Myocardial infarction     MI IN  (stent x 1),  (no stents), 2020 (no stents with last MI).  Just saw Dr. Duenas recently for a f/u.  (assessed 21)    Pancreatitis     Pilonidal cyst     Plantar fasciitis     bilateral    Seasonal allergies     Sleep apnea     CPAP-\"I don't wear it every night\"    Thoracic disc disorder 2005    Urinary frequency     Wears glasses     Wrist fracture, left         Past " Surgical History:   Procedure Laterality Date    APPENDECTOMY      CARDIAC CATHETERIZATION  2006    stent x 1    CARDIAC CATHETERIZATION  2012    no stents    CHOLECYSTECTOMY  08/23/2021    COLONOSCOPY  05/31/2024    COLONOSCOPY N/A 11/23/2021    Procedure: COLONOSCOPY with biopsies;  Surgeon: Yaneth Bell MD;  Location: [facility] ENDOSCOPY;  Service: Gastroenterology;  Laterality: N/A;    CYST REMOVAL      lower part of spine.    ENDOSCOPY      ENDOSCOPY N/A 07/07/2021    Procedure: ESOPHAGOGASTRODUODENOSCOPY WITH BOPSIES;  Surgeon: Arjun Delgadillo MD;  Location: [facility] ENDOSCOPY;  Service: Gastroenterology;  Laterality: N/A;    ENDOSCOPY W/ BANDING N/A 03/17/2022    Procedure: ESOPHAGOGASTRODUODENOSCOPY WITH Biopsy;  Surgeon: Yaneth Bell MD;  Location: [facility] ENDOSCOPY;  Service: Gastroenterology;  Laterality: N/A;    FOOT SURGERY  2015    LIPOMA EXCISION      LIVER BIOPSY  8/23/21    PLANTAR FASCIA RELEASE      right    ROTATOR CUFF REPAIR Right 08/03/2022    SHOULDER SURGERY  2021    SKIN BIOPSY      benign    UPPER GASTROINTESTINAL ENDOSCOPY         Family History   Problem Relation Age of Onset    Diabetes Mother     COPD Mother     Heart failure Mother     Heart disease Father     Lung cancer Father     Pancreatic cancer Paternal Aunt     Colon cancer Paternal Aunt        Social History     Socioeconomic History    Marital status:    Tobacco Use    Smoking status: Never    Smokeless tobacco: Never   Vaping Use    Vaping status: Never Used   Substance and Sexual Activity    Alcohol use: Not Currently    Drug use: No    Sexual activity: Yes     Partners: Female     Birth control/protection: None         Current Outpatient Medications:     Continuous Blood Gluc Sensor (Dexcom G6 Sensor), , Disp: , Rfl:     Continuous Blood Gluc Transmit (Dexcom G6 Transmitter) misc, , Disp: , Rfl:     Dulaglutide (Trulicity) 1.5 MG/0.5ML solution pen-injector, Inject 1.5 mg under the skin into the  "appropriate area as directed., Disp: , Rfl:     nitroglycerin (NITROSTAT) 0.4 MG SL tablet, Place 1 tablet under the tongue., Disp: , Rfl:     Allergies   Allergen Reactions    Morphine And Codeine Anaphylaxis    Simvastatin Other (See Comments)     Liver problems    Warfarin Hives and Itching    Percocet [Oxycodone-Acetaminophen] Itching    Penicillins Hives     Beta lactam allergy details  Antibiotic reaction: hives  Age at reaction: child  Dose to reaction time: (!) hours  Reason for antibiotic: sore throat  Epinephrine required for reaction?: unknown  Tolerated antibiotics: augmentin, amoxicillin        Pradaxa [Dabigatran Etexilate Mesylate] Hives           Objective   BP (!) 166/104   Ht 185.4 cm (72.99\")   Wt 122 kg (269 lb)   BMI 35.50 kg/m²            Physical Exam  Right Hand Exam     Comments:  No significant bruising swelling or erythema is noted.  There is tenderness over the second dorsal compartment just proximal to the base of the thumb.  No tenderness in the dorsal or volar wrist.  Tenderness of the ulnar styloid.  Patient is able to slightly flex and extend the wrist though limited secondary to pain.  Movement of the thumb is significantly limited due to pain.  Cap refill brisk to each digit, gross sensation intact light touch.           Extremity DVT signs are negative by clinical screen.     Independent Review of Radiographic Studies:    Three-view plain films of the right wrist were done in office today for the evaluation of fracture healing, comparison views available.  There is an essentially nondisplaced three-part fracture of the distal radius.  There is extension into the radiocarpal joint though there is no significant articular step-off.  There is also a minimally displaced fracture through the base of the right ulnar styloid.  No significant interval changes from previous exam.     Procedures    Assessment and Plan   Diagnoses and all orders for this visit:    1. Other closed " intra-articular fracture of distal end of right radius with routine healing, subsequent encounter (Primary)    2. Closed displaced fracture of styloid process of right ulna with routine healing, subsequent encounter       Discussion of orthopedic goals  Orthopedic activities reviewed and patient expressed appreciation  Regular exercise as tolerated  Risk, benefits, and merits of treatment alternatives reviewed with the patient and questions answered  Patient guided on mobility and conditioning exercises  Ice, heat, and/or modalities as beneficial  Elevate hand for residual swelling  Reduced physical activity as appropriate and avoid offending activity  Weight bearing parameters reviewed  Use brace as instructed    Recommendations/Plan:  Exercise, medications, injections, other patient advice, and return appointment as noted.  Brace: Wrist brace with thumb spica.  Patient is encouraged to call or return for any issues or concerns.    Given the patient's good improvement with a stable fracture and no significant displacement his cast was discontinued today and he was transition into a Velcro thumb spica wrist brace.  Advised to use the brace as a cast and only remove for ice heat and bathing.  Advise no significant use of his right hand.  I discussed gentle range of motion of the wrist and thumb to reduce stiffness and alleviate pain.  I suspect that his pain over the second dorsal compartment is coming from inflammation of the tendons or perhaps pressure from the cast.  I advised follow-up with me in 2 weeks for reevaluation or sooner if symptoms worsen.  Patient is agreeable with plan.    Return in about 2 weeks (around 1/13/2025) for XOA.  Patient agreeable to call or return sooner for any concerns.

## 2025-01-10 DIAGNOSIS — S52.611D CLOSED DISPLACED FRACTURE OF STYLOID PROCESS OF RIGHT ULNA WITH ROUTINE HEALING, SUBSEQUENT ENCOUNTER: ICD-10-CM

## 2025-01-10 DIAGNOSIS — S52.571D OTHER CLOSED INTRA-ARTICULAR FRACTURE OF DISTAL END OF RIGHT RADIUS WITH ROUTINE HEALING, SUBSEQUENT ENCOUNTER: Primary | ICD-10-CM

## 2025-01-13 ENCOUNTER — TELEPHONE (OUTPATIENT)
Dept: ORTHOPEDIC SURGERY | Facility: CLINIC | Age: 55
End: 2025-01-13
Payer: MEDICARE

## 2025-01-13 ENCOUNTER — OFFICE VISIT (OUTPATIENT)
Dept: ORTHOPEDIC SURGERY | Facility: CLINIC | Age: 55
End: 2025-01-13
Payer: MEDICARE

## 2025-01-13 VITALS
BODY MASS INDEX: 35.65 KG/M2 | SYSTOLIC BLOOD PRESSURE: 160 MMHG | HEIGHT: 73 IN | DIASTOLIC BLOOD PRESSURE: 98 MMHG | WEIGHT: 269 LBS

## 2025-01-13 DIAGNOSIS — S52.611D CLOSED DISPLACED FRACTURE OF STYLOID PROCESS OF RIGHT ULNA WITH ROUTINE HEALING, SUBSEQUENT ENCOUNTER: ICD-10-CM

## 2025-01-13 DIAGNOSIS — S52.571D OTHER CLOSED INTRA-ARTICULAR FRACTURE OF DISTAL END OF RIGHT RADIUS WITH ROUTINE HEALING, SUBSEQUENT ENCOUNTER: Primary | ICD-10-CM

## 2025-01-13 PROCEDURE — 99213 OFFICE O/P EST LOW 20 MIN: CPT | Performed by: STUDENT IN AN ORGANIZED HEALTH CARE EDUCATION/TRAINING PROGRAM

## 2025-01-13 NOTE — TELEPHONE ENCOUNTER
Called patient and let him know we are not in network with his new insurance and he will most likely have to pay out of pocket for his office visit, patient verbalized understanding.

## 2025-01-13 NOTE — PROGRESS NOTES
"    Office Note     Name: Kb Minaya    : 1970     MRN: [id number]     Chief Complaint  Follow-up of the Right Wrist (Closed intra-articular fracture of distal end of right radius with routine healing, DOI: 24. States he has not been wearing the brace for the last week due to increased pain but has been wearing a shoulder sling. )    Subjective     History of Present Illness:  Kb Minaya is a 54 y.o. male presenting today for right wrist follow-up.  Patient states that he is doing very well.  He denies any significant pain at this time.  He does continue to have mild muscular discomfort in the volar wrist but states this is very mild.  He denies any pain over the fracture site.  He discontinued the brace approximately 1 week ago.  He began wrist stretches and gentle range of motion yesterday.    Review of Systems     Past Medical History:   Diagnosis Date    Ankle fracture, left     Anxiety     Anxiety and depression     Asthma     Back pain     Blood clot in vein 2021    right leg-states has resolved now    Cirrhosis     CKD (chronic kidney disease)     pt denies 19    Colon polyp     Coronary artery disease     Depression     Diabetes mellitus     Esophageal varices     Fatty liver     GERD (gastroesophageal reflux disease)     H/O echocardiogram 2021    History of nuclear stress test 2020    \"I did ok with it\"    Hyperlipidemia     Hypertension     Kidney stone     Murmur     Myocardial infarction     MI IN  (stent x 1),  (no stents), 2020 (no stents with last MI).  Just saw Dr. Duenas recently for a f/u.  (assessed 19)    Pancreatitis     Pilonidal cyst     Plantar fasciitis     bilateral    Seasonal allergies     Sleep apnea     CPAP-\"I don't wear it every night\"    Thoracic disc disorder 2005    Urinary frequency     Wears glasses     Wrist fracture, left         Past Surgical History:   Procedure Laterality Date    APPENDECTOMY      " CARDIAC CATHETERIZATION  2006    stent x 1    CARDIAC CATHETERIZATION  2012    no stents    CHOLECYSTECTOMY  08/23/2021    COLONOSCOPY  05/31/2024    COLONOSCOPY N/A 11/23/2021    Procedure: COLONOSCOPY with biopsies;  Surgeon: Yaneth Bell MD;  Location: [facility] ENDOSCOPY;  Service: Gastroenterology;  Laterality: N/A;    CYST REMOVAL      lower part of spine.    ENDOSCOPY      ENDOSCOPY N/A 07/07/2021    Procedure: ESOPHAGOGASTRODUODENOSCOPY WITH BOPSIES;  Surgeon: Arjun Delgadillo MD;  Location: [facility] ENDOSCOPY;  Service: Gastroenterology;  Laterality: N/A;    ENDOSCOPY W/ BANDING N/A 03/17/2022    Procedure: ESOPHAGOGASTRODUODENOSCOPY WITH Biopsy;  Surgeon: Yaneth Bell MD;  Location: [facility] ENDOSCOPY;  Service: Gastroenterology;  Laterality: N/A;    FOOT SURGERY  2015    LIPOMA EXCISION      LIVER BIOPSY  8/23/21    PLANTAR FASCIA RELEASE      right    ROTATOR CUFF REPAIR Right 08/03/2022    SHOULDER SURGERY  2021    SKIN BIOPSY      benign    UPPER GASTROINTESTINAL ENDOSCOPY         Family History   Problem Relation Age of Onset    Diabetes Mother     COPD Mother     Heart failure Mother     Heart disease Father     Lung cancer Father     Pancreatic cancer Paternal Aunt     Colon cancer Paternal Aunt        Social History     Socioeconomic History    Marital status:    Tobacco Use    Smoking status: Never    Smokeless tobacco: Never   Vaping Use    Vaping status: Never Used   Substance and Sexual Activity    Alcohol use: Not Currently    Drug use: No    Sexual activity: Yes     Partners: Female     Birth control/protection: None         Current Outpatient Medications:     Continuous Blood Gluc Sensor (Dexcom G6 Sensor), , Disp: , Rfl:     Continuous Blood Gluc Transmit (Dexcom G6 Transmitter) misc, , Disp: , Rfl:     Dulaglutide (Trulicity) 1.5 MG/0.5ML solution pen-injector, Inject 1.5 mg under the skin into the appropriate area as directed., Disp: , Rfl:     nitroglycerin  "(NITROSTAT) 0.4 MG SL tablet, Place 1 tablet under the tongue., Disp: , Rfl:     Allergies   Allergen Reactions    Morphine And Codeine Anaphylaxis    Simvastatin Other (See Comments)     Liver problems    Warfarin Hives and Itching    Percocet [Oxycodone-Acetaminophen] Itching    Penicillins Hives     Beta lactam allergy details  Antibiotic reaction: hives  Age at reaction: child  Dose to reaction time: (!) hours  Reason for antibiotic: sore throat  Epinephrine required for reaction?: unknown  Tolerated antibiotics: augmentin, amoxicillin        Pradaxa [Dabigatran Etexilate Mesylate] Hives           Objective   /98   Ht 185.4 cm (72.99\")   Wt 122 kg (269 lb)   BMI 35.50 kg/m²            Physical Exam  Right Hand Exam     Tenderness   The patient is experiencing no tenderness.     Range of Motion   Wrist   Extension:  40   Flexion:  40   Pronation:  normal   Supination:  normal     Muscle Strength   The patient has normal right wrist strength.    Other   Erythema: absent  Sensation: normal  Pulse: present           Extremity DVT signs are negative by clinical screen.     Independent Review of Radiographic Studies:    Three-view plain films of the right wrist were done in office today for the evaluation of fracture healing, comparison views available.  There is an essentially nondisplaced three-part fracture of the distal radius.  There is extension into the radiocarpal joint though there is no significant articular step-off.  There is also a minimally displaced fracture through the base of the right ulnar styloid.  No significant interval changes from previous exam.     Procedures    Assessment and Plan   Diagnoses and all orders for this visit:    1. Other closed intra-articular fracture of distal end of right radius with routine healing, subsequent encounter (Primary)    2. Closed displaced fracture of styloid process of right ulna with routine healing, subsequent encounter       Discussion of orthopedic " goals  Orthopedic activities reviewed and patient expressed appreciation  Regular exercise as tolerated  Risk, benefits, and merits of treatment alternatives reviewed with the patient and questions answered  Patient guided on mobility and conditioning exercises  Ice, heat, and/or modalities as beneficial  Reduced physical activity as appropriate and avoid offending activity  Weight bearing parameters reviewed  Work status form completed and provided to patient    Recommendations/Plan:  Exercise, medications, injections, other patient advice, and return appointment as noted.  Patient is encouraged to call or return for any issues or concerns.    Patient is doing very well and recovering well.  He was given an updated work note today to continue restrictions of no lifting more than 10 pounds with right hand for the next 2 weeks and then return to normal duty.  Otherwise advised patient to discontinue brace and to continue gentle range of motion of the wrist to promote rehabilitation.  I advised follow-up with me as needed.    Return if symptoms worsen or fail to improve.  Patient agreeable to call or return sooner for any concerns.

## 2025-02-28 DIAGNOSIS — S52.571D OTHER CLOSED INTRA-ARTICULAR FRACTURE OF DISTAL END OF RIGHT RADIUS WITH ROUTINE HEALING, SUBSEQUENT ENCOUNTER: Primary | ICD-10-CM

## 2025-02-28 DIAGNOSIS — S52.611D CLOSED DISPLACED FRACTURE OF STYLOID PROCESS OF RIGHT ULNA WITH ROUTINE HEALING, SUBSEQUENT ENCOUNTER: ICD-10-CM

## 2025-03-03 ENCOUNTER — HOSPITAL ENCOUNTER (EMERGENCY)
Facility: HOSPITAL | Age: 55
Discharge: HOME | End: 2025-03-03
Attending: EMERGENCY MEDICINE | Admitting: EMERGENCY MEDICINE
Payer: MEDICARE

## 2025-03-03 ENCOUNTER — APPOINTMENT (OUTPATIENT)
Dept: GENERAL RADIOLOGY | Facility: HOSPITAL | Age: 55
End: 2025-03-03
Payer: MEDICARE

## 2025-03-03 VITALS
HEART RATE: 75 BPM | WEIGHT: 269 LBS | HEIGHT: 72 IN | OXYGEN SATURATION: 95 % | TEMPERATURE: 98.4 F | RESPIRATION RATE: 18 BRPM | BODY MASS INDEX: 36.44 KG/M2 | SYSTOLIC BLOOD PRESSURE: 172 MMHG | DIASTOLIC BLOOD PRESSURE: 101 MMHG

## 2025-03-03 DIAGNOSIS — M25.431 PAIN AND SWELLING OF RIGHT WRIST: ICD-10-CM

## 2025-03-03 DIAGNOSIS — M25.531 PAIN AND SWELLING OF RIGHT WRIST: ICD-10-CM

## 2025-03-03 DIAGNOSIS — S52.501D CLOSED FRACTURE OF DISTAL END OF RIGHT RADIUS WITH ROUTINE HEALING, UNSPECIFIED FRACTURE MORPHOLOGY, SUBSEQUENT ENCOUNTER: Primary | ICD-10-CM

## 2025-03-03 PROCEDURE — 96372 THER/PROPH/DIAG INJ SC/IM: CPT

## 2025-03-03 PROCEDURE — 73110 X-RAY EXAM OF WRIST: CPT

## 2025-03-03 PROCEDURE — 25010000002 KETOROLAC TROMETHAMINE PER 15 MG

## 2025-03-03 PROCEDURE — 99283 EMERGENCY DEPT VISIT LOW MDM: CPT | Performed by: EMERGENCY MEDICINE

## 2025-03-03 RX ORDER — KETOROLAC TROMETHAMINE 30 MG/ML
30 INJECTION, SOLUTION INTRAMUSCULAR; INTRAVENOUS ONCE
Status: COMPLETED | OUTPATIENT
Start: 2025-03-03 | End: 2025-03-03

## 2025-03-03 RX ORDER — MELOXICAM 7.5 MG/1
15 TABLET ORAL DAILY
Qty: 14 TABLET | Refills: 0 | Status: SHIPPED | OUTPATIENT
Start: 2025-03-03 | End: 2025-03-10

## 2025-03-03 RX ADMIN — KETOROLAC TROMETHAMINE 30 MG: 30 INJECTION, SOLUTION INTRAMUSCULAR; INTRAVENOUS at 14:35

## 2025-03-03 NOTE — ED PROVIDER NOTES
" EMERGENCY DEPARTMENT ENCOUNTER    Pt Name: Kb Minaya  MRN: [id number]  Pt :   1970  Room Number:  23SF/23  Date of encounter:  3/3/2025  PCP: Vilma Cassidy MD  ED Provider: Eric Lerma PA-C    Historian: Patient, patient's wife, nursing note      HPI:  Chief Complaint: Right wrist pain and        Context: Kb Minaya is a 55 y.o. male who presents to the ED c/o right wrist pain and swelling for the past several weeks worsening over the last several days.  Patient reports a recent history of fracture to his right wrist in January for which he wore a cast for several weeks but has been without a cast for the last 2 weeks.  He reports multiple contusion type injuries to the right wrist since removal of his cast.  He was supposed to see orthopedic surgery today but the appointment has been rescheduled.  Patient reports swelling in the right wrist, right wrist pain, and shooting pains up the back of his right forearm.      PAST MEDICAL HISTORY  Past Medical History:   Diagnosis Date    Ankle fracture, left     Anxiety     Anxiety and depression     Asthma     Back pain     Blood clot in vein 2021    right leg-states has resolved now    Cirrhosis     CKD (chronic kidney disease)     pt denies 21    Colon polyp     Coronary artery disease     Depression     Diabetes mellitus     Esophageal varices     Fatty liver     GERD (gastroesophageal reflux disease)     H/O echocardiogram 2021    History of nuclear stress test 2020    \"I did ok with it\"    Hyperlipidemia     Hypertension     Kidney stone     Murmur     Myocardial infarction     MI IN  (stent x 1),  (no stents), 2020 (no stents with last MI).  Just saw Dr. Duenas recently for a f/u.  (assessed 21)    Pancreatitis     Pilonidal cyst     Plantar fasciitis     bilateral    Seasonal allergies     Sleep apnea     CPAP-\"I don't wear it every night\"    Thoracic disc disorder     Urinary " frequency     Wears glasses     Wrist fracture, left          PAST SURGICAL HISTORY  Past Surgical History:   Procedure Laterality Date    APPENDECTOMY      CARDIAC CATHETERIZATION  2006    stent x 1    CARDIAC CATHETERIZATION  2012    no stents    CHOLECYSTECTOMY  08/23/2021    COLONOSCOPY  05/31/2024    COLONOSCOPY N/A 11/23/2021    Procedure: COLONOSCOPY with biopsies;  Surgeon: Yaneth Bell MD;  Location: [facility] ENDOSCOPY;  Service: Gastroenterology;  Laterality: N/A;    CYST REMOVAL      lower part of spine.    ENDOSCOPY      ENDOSCOPY N/A 07/07/2021    Procedure: ESOPHAGOGASTRODUODENOSCOPY WITH BOPSIES;  Surgeon: Arjun Delgadillo MD;  Location: [facility] ENDOSCOPY;  Service: Gastroenterology;  Laterality: N/A;    ENDOSCOPY W/ BANDING N/A 03/17/2022    Procedure: ESOPHAGOGASTRODUODENOSCOPY WITH Biopsy;  Surgeon: Yaneth Bell MD;  Location: [facility] ENDOSCOPY;  Service: Gastroenterology;  Laterality: N/A;    FOOT SURGERY  2015    LIPOMA EXCISION      LIVER BIOPSY  8/23/21    PLANTAR FASCIA RELEASE      right    ROTATOR CUFF REPAIR Right 08/03/2022    SHOULDER SURGERY  2021    SKIN BIOPSY      benign    UPPER GASTROINTESTINAL ENDOSCOPY           FAMILY HISTORY  Family History   Problem Relation Age of Onset    Diabetes Mother     COPD Mother     Heart failure Mother     Heart disease Father     Lung cancer Father     Pancreatic cancer Paternal Aunt     Colon cancer Paternal Aunt          SOCIAL HISTORY  Social History     Socioeconomic History    Marital status:    Tobacco Use    Smoking status: Never    Smokeless tobacco: Never   Vaping Use    Vaping status: Never Used   Substance and Sexual Activity    Alcohol use: Not Currently    Drug use: No    Sexual activity: Yes     Partners: Female     Birth control/protection: None         ALLERGIES  Morphine and codeine, Simvastatin, Warfarin, Percocet [oxycodone-acetaminophen], Penicillins, and Pradaxa [dabigatran etexilate  mesylate]        REVIEW OF SYSTEMS  Review of Systems   Constitutional:  Negative for chills and fever.   HENT:  Negative for congestion and sore throat.    Respiratory:  Negative for cough and shortness of breath.    Cardiovascular:  Negative for chest pain.   Gastrointestinal:  Negative for abdominal pain, nausea and vomiting.   Genitourinary:  Negative for dysuria.   Musculoskeletal:  Negative for back pain.        Right wrist pain and swelling   Skin:  Negative for wound.   Neurological:  Negative for dizziness and headaches.   Psychiatric/Behavioral:  Negative for confusion.    All other systems reviewed and are negative.         All systems reviewed and negative except for those discussed in HPI.       PHYSICAL EXAM    I have reviewed the triage vital signs and nursing notes.    ED Triage Vitals [03/03/25 1402]   Temp Heart Rate Resp BP SpO2   98.4 °F (36.9 °C) 75 18 (!) 172/101 95 %      Temp src Heart Rate Source Patient Position BP Location FiO2 (%)   Oral Monitor Sitting Left arm --       Physical Exam  Vitals and nursing note reviewed.   Constitutional:       General: He is not in acute distress.     Appearance: Normal appearance. He is not ill-appearing, toxic-appearing or diaphoretic.   HENT:      Head: Normocephalic and atraumatic.      Right Ear: External ear normal.      Left Ear: External ear normal.      Nose: No congestion or rhinorrhea.      Mouth/Throat:      Mouth: Mucous membranes are moist.      Pharynx: Oropharynx is clear.   Eyes:      Conjunctiva/sclera: Conjunctivae normal.   Cardiovascular:      Rate and Rhythm: Normal rate.      Heart sounds: Normal heart sounds.   Pulmonary:      Effort: Pulmonary effort is normal. No respiratory distress.      Breath sounds: Normal breath sounds. No wheezing.   Abdominal:      Tenderness: There is no abdominal tenderness.   Musculoskeletal:      Right wrist: Swelling and tenderness present. No bony tenderness or snuff box tenderness. Normal pulse.       Cervical back: Normal range of motion and neck supple.      Right lower leg: No edema.      Left lower leg: No edema.   Skin:     Findings: No rash.   Neurological:      Mental Status: He is alert.             LAB RESULTS  No results found for this or any previous visit (from the past 24 hours).    If labs were ordered, I independently reviewed the results and considered them in treating the patient.        RADIOLOGY  XR Wrist 3+ View Right    Result Date: 3/3/2025   PROCEDURE: XR WRIST 3+ VW RIGHT-  HISTORY: posterior wrist tender, selling, recent distal radius/ulna fracture  COMPARISON: January 13, 2025.  FINDINGS:  A three view exam demonstrates new fracture or dislocation. The joint spaces demonstrate mild narrowing of the right radiocarpal and first metacarpal carpal joints. Previously described transverse fracture line is not well identified on this exam. There is also decreased visualization of the roof of the laryngeal articular surface consistent with interval healing. There is some soft tissue swelling.       Healing distal right radial fracture as described.        This report was signed and finalized on 3/3/2025 3:09 PM by Gail Squires MD.       I ordered and independently reviewed the above noted radiographic studies.      I viewed images of right wrist x-ray which showed appropriately healing distal radius fracture per my independent interpretation      See radiologist's dictation for official interpretation.        PROCEDURES    Procedures    No orders to display       MEDICATIONS GIVEN IN ER    Medications   ketorolac (TORADOL) injection 30 mg (30 mg Intramuscular Given 3/3/25 1435)         MEDICAL DECISION MAKING, PROGRESS, and CONSULTS    All labs, if obtained, have been independently reviewed by me.  All radiology studies, if obtained, have been reviewed by me and the radiologist dictating the report.  All EKG's, if obtained, have been independently viewed and interpreted by me/my attending  physician.      Discussion below represents my analysis of pertinent findings related to patient's condition, differential diagnosis, treatment plan and final disposition.    Patient is a 55-year-old male presenting to the emergency department today for evaluation of continuing pain and worsening swelling to the right wrist after recent contusions in the setting of a recent distal radius fracture from earlier in January.  His right upper extremity exhibits some swelling over the wrist, easily palpable radial pulse warm well-perfused hand with normal capillary refill, and no proximal forearm elbow upper arm or shoulder tenderness or swelling.  No clinical suspicion for acute arterial compromise or DVT based on exam.  Plan will be for right wrist x-ray to rule out new injury and treatment of patient's pain with Toradol.    Right wrist x-ray per my independent interpretation showed a well-healing distal radius fracture with no obvious new injuries.  Radiology overread confirmed well-healing fracture.  I offered the patient a splint which he refused stating he already had a similar splint at home.  Highly encourage patient to follow-up as soon as possible with an orthopedic surgeon.  Patient verbalized understanding of and agreement with this plan.  Prescribe meloxicam at discharge.  Strict ED return precautions were explained and patient's family verbalized understanding.                       Differential diagnosis:    Differential diagnosis included but was not limited to forearm contusion, wrist sprain, arthritis, carpal tunnel syndrome, fracture      Additional sources:    - Discussed/ obtained information from independent historians: Patient's wife at bedside in addition to patient    - External (non-ED) record review:  I reviewed recent radiologist report from right wrist x-rays performed on 12/23/2024 and 1/10/2025 showing a nondisplaced three-part fracture of distal radius at various stages of healing amongst  these x-rays.    - Chronic or social conditions impacting care: None    Orders placed during this visit:  Orders Placed This Encounter   Procedures    XR Wrist 3+ View Right    Ambulatory Referral to Orthopedic Surgery         Additional orders considered but not ordered: None      ED Course:    Consultants: None    ED Course as of 03/03/25 1556   Mon Mar 03, 2025   1541 Patient refused splint as he already has splint at home [TG]      ED Course User Index  [TG] Eric Lerma PA-C              Shared Decision Making:  After my consideration of clinical presentation and any laboratory/radiology studies obtained, I discussed the findings with the patient/patient representative who is in agreement with the treatment plan and the final disposition.   Risks and benefits of discharge and/or observation/admission were discussed.       AS OF 15:56 EST VITALS:    BP - (!) 172/101  HR - 75  TEMP - 98.4 °F (36.9 °C) (Oral)  O2 SATS - 95%                  DIAGNOSIS  Final diagnoses:   Closed fracture of distal end of right radius with routine healing, unspecified fracture morphology, subsequent encounter   Pain and swelling of right wrist         DISPOSITION  Discharge      Please note that portions of this document were completed with voice recognition software.      Eric Lerma PA-C  03/03/25 0693

## 2025-03-03 NOTE — DISCHARGE INSTRUCTIONS
Wear your splint as tolerated rest ice and elevate the wrist as tolerated and please follow-up with her orthopedic surgeon as soon as possible.  Return to the ER for any acute changes or worsening of your condition right away.

## 2025-04-08 DIAGNOSIS — S52.571D OTHER CLOSED INTRA-ARTICULAR FRACTURE OF DISTAL END OF RIGHT RADIUS WITH ROUTINE HEALING, SUBSEQUENT ENCOUNTER: Primary | ICD-10-CM

## 2025-04-09 ENCOUNTER — OFFICE VISIT (OUTPATIENT)
Dept: ORTHOPEDIC SURGERY | Facility: CLINIC | Age: 55
End: 2025-04-09
Payer: COMMERCIAL

## 2025-04-09 VITALS
BODY MASS INDEX: 35.21 KG/M2 | SYSTOLIC BLOOD PRESSURE: 130 MMHG | WEIGHT: 260 LBS | HEIGHT: 72 IN | DIASTOLIC BLOOD PRESSURE: 88 MMHG

## 2025-04-09 DIAGNOSIS — M25.631 WRIST STIFFNESS, RIGHT: ICD-10-CM

## 2025-04-09 DIAGNOSIS — R20.2 HAND PARESTHESIA: ICD-10-CM

## 2025-04-09 DIAGNOSIS — S52.571D OTHER CLOSED INTRA-ARTICULAR FRACTURE OF DISTAL END OF RIGHT RADIUS WITH ROUTINE HEALING, SUBSEQUENT ENCOUNTER: ICD-10-CM

## 2025-04-09 DIAGNOSIS — M25.531 CHRONIC PAIN OF RIGHT WRIST: Primary | Chronic | ICD-10-CM

## 2025-04-09 DIAGNOSIS — G89.29 CHRONIC PAIN OF RIGHT WRIST: Primary | Chronic | ICD-10-CM

## 2025-04-09 RX ORDER — BUSPIRONE HYDROCHLORIDE 10 MG/1
10 TABLET ORAL 3 TIMES DAILY
COMMUNITY
Start: 2025-04-08

## 2025-04-09 RX ORDER — CHOLESTYRAMINE LIGHT 4 G/5.7G
4 POWDER, FOR SUSPENSION ORAL 3 TIMES DAILY
COMMUNITY
Start: 2025-04-08

## 2025-04-09 NOTE — PROGRESS NOTES
Office Note     Name: Kb Minaya    : 1970     MRN: [id number]     Chief Complaint  Follow-up of the Right Wrist (Closed intra-articular fracture of distal end of right radius with routine healing, DOI: 24. States he is still having constant aching, he hit it about a month ago and went to the ER, they told him it was tendon related and he would need an MRI. He is also having numbness in his hand, he has been sleeping in a brace which has not helped much. )    Subjective     History of Present Illness:  Kb Minaya is a 55 y.o. male presenting today for right wrist follow-up.  Patient states since his last visit with me he has been experiencing mild to moderate pain in his wrist.  He states his pain is focally over the dorsal middle wrist and ulnar styloid.  The pain on the ulnar styloid will occasionally radiate up the arm and down the hand.  He also complains of decreased sensation, numbness, and tingling in the right 1st and 2nd digits.  He notes multiple relatively mild contusion like injuries on the right wrist over the past couple of months while working.  He has been self treating with a wrist brace for carpal tunnel syndrome that he bought over-the-counter.  He notes this brace does help him.  He was recently seen in the emergency department approximately 5 weeks ago for right wrist pain and swelling.  The emergency department provider suspected tendon pathology and/or pain from fracture healing.  He was offered a splint at the ER which the patient refused because he had a similar splint at home.  He was prescribed meloxicam.  He notes the meloxicam does help significantly with his pain.      Review of Systems   Musculoskeletal:  Positive for arthralgias (right wrist).   Neurological:  Positive for numbness (right hand).        Past Medical History:   Diagnosis Date    Ankle fracture, left     Anxiety     Anxiety and depression     Asthma     Back pain     Blood clot in vein  "01/2021    right leg-states has resolved now    Cirrhosis     CKD (chronic kidney disease)     pt denies 7/6/21    Colon polyp     Coronary artery disease     Depression     Diabetes mellitus     Esophageal varices 003/01/2022    Fatty liver     GERD (gastroesophageal reflux disease)     H/O echocardiogram 02/2021    History of nuclear stress test 01/2020    \"I did ok with it\"    Hyperlipidemia     Hypertension     Kidney stone     Murmur     Myocardial infarction     MI IN 2006 (stent x 1), 2012 (no stents), 8/2/2020 (no stents with last MI).  Just saw Dr. Duenas recently for a f/u.  (assessed 11/22/21)    Pancreatitis 03/22    Pilonidal cyst     Plantar fasciitis     bilateral    Seasonal allergies     Sleep apnea     CPAP-\"I don't wear it every night\"    Thoracic disc disorder 2005    Urinary frequency     Wears glasses     Wrist fracture, left         Past Surgical History:   Procedure Laterality Date    APPENDECTOMY      CARDIAC CATHETERIZATION  2006    stent x 1    CARDIAC CATHETERIZATION  2012    no stents    CHOLECYSTECTOMY  08/23/2021    COLONOSCOPY  05/31/2024    COLONOSCOPY N/A 11/23/2021    Procedure: COLONOSCOPY with biopsies;  Surgeon: Yaneth Bell MD;  Location: [facility] ENDOSCOPY;  Service: Gastroenterology;  Laterality: N/A;    CYST REMOVAL      lower part of spine.    ENDOSCOPY      ENDOSCOPY N/A 07/07/2021    Procedure: ESOPHAGOGASTRODUODENOSCOPY WITH BOPSIES;  Surgeon: Arjun Delgadillo MD;  Location: [facility] ENDOSCOPY;  Service: Gastroenterology;  Laterality: N/A;    ENDOSCOPY W/ BANDING N/A 03/17/2022    Procedure: ESOPHAGOGASTRODUODENOSCOPY WITH Biopsy;  Surgeon: Yaneth Bell MD;  Location: [facility] ENDOSCOPY;  Service: Gastroenterology;  Laterality: N/A;    FOOT SURGERY  2015    LIPOMA EXCISION      LIVER BIOPSY  8/23/21    PLANTAR FASCIA RELEASE      right    ROTATOR CUFF REPAIR Right 08/03/2022    SHOULDER SURGERY  2021    SKIN BIOPSY      benign    UPPER GASTROINTESTINAL " "ENDOSCOPY         Family History   Problem Relation Age of Onset    Diabetes Mother     COPD Mother     Heart failure Mother     Heart disease Father     Lung cancer Father     Pancreatic cancer Paternal Aunt     Colon cancer Paternal Aunt        Social History     Socioeconomic History    Marital status:    Tobacco Use    Smoking status: Never    Smokeless tobacco: Never   Vaping Use    Vaping status: Never Used   Substance and Sexual Activity    Alcohol use: Not Currently    Drug use: No    Sexual activity: Yes     Partners: Female     Birth control/protection: None         Current Outpatient Medications:     busPIRone (BUSPAR) 10 MG tablet, Take 1 tablet by mouth 3 (Three) Times a Day., Disp: , Rfl:     cholestyramine light (PREVALITE) 4 GM/DOSE powder, Take 1 packet by mouth 3 (Three) Times a Day., Disp: , Rfl:     Continuous Blood Gluc Sensor (Dexcom G6 Sensor), , Disp: , Rfl:     Continuous Blood Gluc Transmit (Dexcom G6 Transmitter) misc, , Disp: , Rfl:     Dulaglutide (Trulicity) 1.5 MG/0.5ML solution pen-injector, Inject 1.5 mg under the skin into the appropriate area as directed., Disp: , Rfl:     nitroglycerin (NITROSTAT) 0.4 MG SL tablet, Place 1 tablet under the tongue., Disp: , Rfl:     Allergies   Allergen Reactions    Morphine And Codeine Anaphylaxis    Simvastatin Other (See Comments)     Liver problems    Warfarin Hives and Itching    Percocet [Oxycodone-Acetaminophen] Itching    Penicillins Hives     Beta lactam allergy details  Antibiotic reaction: hives  Age at reaction: child  Dose to reaction time: (!) hours  Reason for antibiotic: sore throat  Epinephrine required for reaction?: unknown  Tolerated antibiotics: augmentin, amoxicillin        Pradaxa [Dabigatran Etexilate Mesylate] Hives           Objective   /88   Ht 182.9 cm (72.01\")   Wt 118 kg (260 lb)   BMI 35.25 kg/m²            Physical Exam  Right Hand Exam     Tenderness   Right hand tenderness location: Dorsal " radiocarpal joint, ulnar styloid, pisiform.    Range of Motion   Wrist   Extension:  40   Flexion:  20   Pronation:  normal   Supination:  normal     Muscle Strength   Wrist extension: 4/5   Wrist flexion: 4/5   : 5/5     Tests   Phalen’s Sign: negative  Tinel's sign (median nerve): negative    Other   Erythema: absent  Sensation: decreased  Pulse: present    Comments:  Decrease sensation to cursory light touch in the right 1st and 2nd digits.  Negative carpal tunnel testing today.  Significantly reduced wrist flexion.           Extremity DVT signs are negative by clinical screen.     Independent Review of Radiographic Studies:    I personally reviewed the available, pertinent imaging studies and I agree with the radiologist's interpretation.      Study Result    Narrative & Impression      PROCEDURE: XR WRIST 3+ VW RIGHT-     HISTORY: posterior wrist tender, selling, recent distal radius/ulna  fracture     COMPARISON: January 13, 2025.     FINDINGS:  A three view exam demonstrates new fracture or dislocation.  The joint spaces demonstrate mild narrowing of the right radiocarpal and  first metacarpal carpal joints. Previously described transverse fracture  line is not well identified on this exam. There is also decreased  visualization of the roof of the laryngeal articular surface consistent  with interval healing. There is some soft tissue swelling.        IMPRESSION:  Healing distal right radial fracture as described.     Three-view plain films of the right wrist were done in office today for the evaluation of fracture healing and joint condition, comparison views available.  There is a nondisplaced three-part fracture of the distal radius with extension into the radiocarpal joint though there is no significant articular step-off.  There is a minimally displaced fracture through the base of the right ulnar styloid.  No significant interval changes are noted from recent to previous exam.  There is mild to  moderate narrowing of the right radiocarpal and first CMC joints.    Procedures    Assessment and Plan   Diagnoses and all orders for this visit:    1. Chronic pain of right wrist (Primary)  -     Ambulatory Referral to Physical Therapy for Evaluation & Treatment  -     MRI Wrist Right Without Contrast    2. Wrist stiffness, right  -     Ambulatory Referral to Physical Therapy for Evaluation & Treatment    3. Other closed intra-articular fracture of distal end of right radius with routine healing, subsequent encounter  -     Ambulatory Referral to Physical Therapy for Evaluation & Treatment  -     MRI Wrist Right Without Contrast    4. Hand paresthesia, right 1st and 2nd digit  -     Ambulatory Referral to Physical Therapy for Evaluation & Treatment       Discussion of orthopedic goals  Orthopedic activities reviewed and patient expressed appreciation  Risk, benefits, and merits of treatment alternatives reviewed with the patient and questions answered  Patient guided on mobility and conditioning exercises  Ice, heat, and/or modalities as needed and beneficial  Elevate hand for residual swelling  Physical therapy referral given.  Discussed gentle range of motion activities/exercises for the affected joint with the patient.    Reduced physical activity as appropriate and avoid aggravating activities.   Weight bearing parameters reviewed.   Use brace as instructed.     Recommendations/Plan:  Exercise, medications, injections, other patient advice, and return appointment as noted.  Referral: Physical and Occupational Therapy referral.  Test/Studies: MRI without contrast of the right wrist was ordered for further evaluation of the patient's chronic pain.  This pain may be from fracture healing or perhaps osteoarthritis or tendon related.  Patient and/or guardian(s) were encouraged to call or return to the office for any issues or concerns.    Patient was given a order for physical therapy for the right wrist due to his  chronic pain and wrist stiffness and paresthesias.  I advised him to continue with brace usage at night and throughout the day as needed.  MRI ordered for further evaluation of the soft tissue and to evaluate fracture healing.  Encouraged him to continue taking meloxicam once daily as needed for pain.  He denies the need for work restrictions.  We will follow-up in 1 month for reevaluation.    Return in about 4 weeks (around 5/7/2025) for Recheck.

## 2025-04-22 ENCOUNTER — TELEPHONE (OUTPATIENT)
Dept: ONCOLOGY | Facility: CLINIC | Age: 55
End: 2025-04-22

## 2025-04-22 PROBLEM — D69.6 THROMBOCYTOPENIA DUE TO SEQUESTRATION: Status: ACTIVE | Noted: 2025-04-22

## 2025-04-22 NOTE — TELEPHONE ENCOUNTER
Caller: Kb Minaya    Relationship:  Self    Best call back number: [phone]    PATIENT CALLED REQUESTING TO CANCEL SAME DAY APPT.    Did the patient call AFTER the start time of their scheduled appointment?  []YES  [x]NO        Any additional information: PLEASE CALL TO RESCHEDULE

## 2025-04-28 ENCOUNTER — HOSPITAL ENCOUNTER (OUTPATIENT)
Facility: HOSPITAL | Age: 55
Discharge: HOME | End: 2025-04-28
Admitting: STUDENT IN AN ORGANIZED HEALTH CARE EDUCATION/TRAINING PROGRAM
Payer: MEDICARE

## 2025-04-28 PROCEDURE — 73221 MRI JOINT UPR EXTREM W/O DYE: CPT

## 2025-05-07 ENCOUNTER — OFFICE VISIT (OUTPATIENT)
Dept: ORTHOPEDIC SURGERY | Facility: CLINIC | Age: 55
End: 2025-05-07
Payer: MEDICARE

## 2025-05-07 VITALS
WEIGHT: 259 LBS | BODY MASS INDEX: 35.08 KG/M2 | DIASTOLIC BLOOD PRESSURE: 90 MMHG | SYSTOLIC BLOOD PRESSURE: 136 MMHG | HEIGHT: 72 IN

## 2025-05-07 DIAGNOSIS — S52.571D OTHER CLOSED INTRA-ARTICULAR FRACTURE OF DISTAL END OF RIGHT RADIUS WITH ROUTINE HEALING, SUBSEQUENT ENCOUNTER: ICD-10-CM

## 2025-05-07 DIAGNOSIS — M19.131 POST-TRAUMATIC ARTHRITIS OF RIGHT WRIST: ICD-10-CM

## 2025-05-07 DIAGNOSIS — M25.631 WRIST STIFFNESS, RIGHT: ICD-10-CM

## 2025-05-07 DIAGNOSIS — S52.611D CLOSED DISPLACED FRACTURE OF STYLOID PROCESS OF RIGHT ULNA WITH ROUTINE HEALING, SUBSEQUENT ENCOUNTER: ICD-10-CM

## 2025-05-07 DIAGNOSIS — G89.29 CHRONIC PAIN OF RIGHT WRIST: Primary | ICD-10-CM

## 2025-05-07 DIAGNOSIS — M25.531 CHRONIC PAIN OF RIGHT WRIST: Primary | ICD-10-CM

## 2025-05-07 NOTE — PROGRESS NOTES
"    Office Note     Name: Kb Minaya    : 1970     MRN: [id number]     Chief Complaint  Follow-up of the Right Wrist (Here to review MRI. States the wrist brace has been helping but he still has some soreness and numbness and tingling in his thumb. )    Subjective     History of Present Illness:  Kb Minaya is a 55 y.o. male presenting today for right wrist follow-up to discuss results of recent MRI.  Overall patient states that his wrist continues to improve though he does continue to have pain in the area of the volar wrist, first CMC joint, and ulnar wrist.  MRI results are shown below.  He states his symptoms are relatively mild and well-controlled with over-the-counter Tylenol and occasional Aleve.  He continues wearing a brace at night for his wrist which helps as well as placing ice and heat on the wrist which helps.  He denies any new or worsening symptoms since his last visit with me.      Review of Systems     Past Medical History:   Diagnosis Date    Ankle fracture, left     Anxiety     Anxiety and depression     Asthma     Back pain     Blood clot in vein 2021    right leg-states has resolved now    Cirrhosis     CKD (chronic kidney disease)     pt denies 21    Colon polyp     Coronary artery disease     Depression     Diabetes mellitus     Esophageal varices     Fatty liver     GERD (gastroesophageal reflux disease)     H/O echocardiogram 2021    History of nuclear stress test 2020    \"I did ok with it\"    Hyperlipidemia     Hypertension     Kidney stone     Murmur     Myocardial infarction     MI IN  (stent x 1),  (no stents), 2020 (no stents with last MI).  Just saw Dr. Duenas recently for a f/u.  (assessed 21)    Pancreatitis     Pilonidal cyst     Plantar fasciitis     bilateral    Seasonal allergies     Sleep apnea     CPAP-\"I don't wear it every night\"    Thoracic disc disorder 2005    Urinary frequency     Wears glasses     Wrist " fracture, left         Past Surgical History:   Procedure Laterality Date    APPENDECTOMY      CARDIAC CATHETERIZATION  2006    stent x 1    CARDIAC CATHETERIZATION  2012    no stents    CHOLECYSTECTOMY  08/23/2021    COLONOSCOPY  05/31/2024    COLONOSCOPY N/A 11/23/2021    Procedure: COLONOSCOPY with biopsies;  Surgeon: Yaneth Bell MD;  Location: [facility] ENDOSCOPY;  Service: Gastroenterology;  Laterality: N/A;    CYST REMOVAL      lower part of spine.    ENDOSCOPY      ENDOSCOPY N/A 07/07/2021    Procedure: ESOPHAGOGASTRODUODENOSCOPY WITH BOPSIES;  Surgeon: Arjun Delgadillo MD;  Location: [facility] ENDOSCOPY;  Service: Gastroenterology;  Laterality: N/A;    ENDOSCOPY W/ BANDING N/A 03/17/2022    Procedure: ESOPHAGOGASTRODUODENOSCOPY WITH Biopsy;  Surgeon: Yaneth Bell MD;  Location: [facility] ENDOSCOPY;  Service: Gastroenterology;  Laterality: N/A;    FOOT SURGERY  2015    LIPOMA EXCISION      LIVER BIOPSY  8/23/21    PLANTAR FASCIA RELEASE      right    ROTATOR CUFF REPAIR Right 08/03/2022    SHOULDER SURGERY  2021    SKIN BIOPSY      benign    UPPER GASTROINTESTINAL ENDOSCOPY         Family History   Problem Relation Age of Onset    Diabetes Mother     COPD Mother     Heart failure Mother     Heart disease Father     Lung cancer Father     Pancreatic cancer Paternal Aunt     Colon cancer Paternal Aunt        Social History     Socioeconomic History    Marital status:    Tobacco Use    Smoking status: Never    Smokeless tobacco: Never   Vaping Use    Vaping status: Never Used   Substance and Sexual Activity    Alcohol use: Not Currently    Drug use: No    Sexual activity: Yes     Partners: Female     Birth control/protection: None         Current Outpatient Medications:     busPIRone (BUSPAR) 10 MG tablet, Take 1 tablet by mouth 3 (Three) Times a Day., Disp: , Rfl:     cholestyramine light (PREVALITE) 4 GM/DOSE powder, Take 1 packet by mouth 3 (Three) Times a Day., Disp: , Rfl:     Continuous  "Blood Gluc Sensor (Dexcom G6 Sensor), , Disp: , Rfl:     Continuous Blood Gluc Transmit (Dexcom G6 Transmitter) misc, , Disp: , Rfl:     Dulaglutide (Trulicity) 1.5 MG/0.5ML solution pen-injector, Inject 1.5 mg under the skin into the appropriate area as directed., Disp: , Rfl:     nitroglycerin (NITROSTAT) 0.4 MG SL tablet, Place 1 tablet under the tongue., Disp: , Rfl:     Allergies   Allergen Reactions    Morphine And Codeine Anaphylaxis    Simvastatin Other (See Comments)     Liver problems    Warfarin Hives and Itching    Percocet [Oxycodone-Acetaminophen] Itching    Penicillins Hives     Beta lactam allergy details  Antibiotic reaction: hives  Age at reaction: child  Dose to reaction time: (!) hours  Reason for antibiotic: sore throat  Epinephrine required for reaction?: unknown  Tolerated antibiotics: augmentin, amoxicillin        Pradaxa [Dabigatran Etexilate Mesylate] Hives           Objective   /90   Ht 182.9 cm (72.01\")   Wt 117 kg (259 lb)   BMI 35.12 kg/m²            Physical Exam  Right Hand Exam     Tenderness   The patient is experiencing tenderness in the ulnar area.    Range of Motion   Wrist   Extension:  40   Flexion:  40   Pronation:  normal   Supination:  normal     Muscle Strength   The patient has normal right wrist strength.    Other   Erythema: absent  Sensation: normal  Pulse: present           Extremity DVT signs are negative by clinical screen.     Independent Review of Radiographic Studies:    I personally reviewed the available, pertinent imaging studies and I agree with the radiologist's interpretation.    MRI Wrist Right Without Contrast  Order date: 4/9/2025  Authorizing: Norman Sweet PA-C  Ordered by Norman Sweet PA-C on 4/9/2025.     Narrative & Impression    MRI WRIST RIGHT WO CONTRAST     Date of Exam: 4/28/2025 4:07 PM EDT     Indication: Chronic right wrist pain following intraarticular fracture more than 3 months ago. Wrist fracture occurred 12/2/2024   "   Comparison: Outside wrist radiograph 4/9/2025, wrist radiograph 12/6/2024     Technique:  Routine multiplanar/multisequence images of the right wrist were obtained without contrast administration.          Findings:  Bone:  Bone marrow signal intensity is normal for age. There is a subacute comminuted fracture of the distal radius with mild impaction. Preservation of the volar tilt of the distal radius. Mild irregularity of the articular surface at the mid articular   surface. There is a nonunited ulnar styloid process fracture. Fracture fragment measures up to 9 mm. It is displaced by 4 mm. Mild radiocarpal joint space narrowing. Moderate triscaphe and first carpometacarpal joint space narrowing.     Ligaments:  The scapholunate and lunatotriquetral ligaments are intact. The extrinsic wrist ligaments are intact.     Tendons:  The extensor tendons are intact. The flexor tendons are intact. No significant tenosynovitis.     Triangular Fibrocartilage:  The triangular fibrocartilage complex is intact and has a normal appearance for patient's age.     Cartilage:  No cartilage defects.     Other Intraarticular:  Small distal radial ulnar joint and radiocarpal joint effusion. 1     Extra-articular:  The visualized portions of the ulnar and median nerves are unremarkable. No masses in the carpal tunnel or Guyon's canal. No soft tissue mass.     IMPRESSION:  1.There is a subacute comminuted fracture of the distal radius with mild impaction.  2.There is a nonunited ulnar styloid process fracture.  3.There is a small distal radial ulnar joint and radiocarpal joint effusion.  4.There is mild to moderate arthritis of the wrist as described.           Electronically Signed: Nettie Mishra MD    5/2/2025 12:51 PM EDT    Workstation ID: [id number]       Review of unique test(s): 1    Procedures    Assessment and Plan   Diagnoses and all orders for this visit:    1. Chronic pain of right wrist (Primary)    2. Post-traumatic arthritis  of right wrist    3. Other closed intra-articular fracture of distal end of right radius with routine healing, subsequent encounter    4. Closed displaced fracture of styloid process of right ulna with routine healing, subsequent encounter    5. Wrist stiffness, right       Discussion of orthopedic goals  Orthopedic activities reviewed and patient expressed appreciation  Risk, benefits, and merits of treatment alternatives reviewed with the patient and questions answered  Patient guided on mobility and conditioning exercises  RICE, heat, and/or modalities as needed and beneficial  Elevate hand for residual swelling  Patient was counseled and provided with a HEP.   Reduced physical activity as appropriate and avoid aggravating activities.   Weight bearing parameters reviewed.   Use brace as instructed.     Recommendations/Plan:  Exercise, medications, injections, other patient advice, and return appointment as noted.  Brace: No brace was given at today's visit. Wrist brace.  Patient and/or guardian(s) were encouraged to call or return to the office for any issues or concerns.    I discussed the MRI results with the patient as well as potential treatment plans.  I did suggest a cortisone injection in the radiocarpal joint or first CMC joint though patient defers today.  At this time he would like to continue with conservative treatment including over-the-counter Tylenol, occasional Aleve, wrist brace, ice and heat.  He denies the need for work restrictions.  He states he is doing well and will follow-up with me as needed.    Return if symptoms worsen or fail to improve.

## 2025-05-12 NOTE — ANESTHESIA POSTPROCEDURE EVALUATION
05/12/25 1136   Discharge Planning   Living Arrangements Spouse/significant other   Support Systems Spouse/significant other   Type of Residence Private residence   Home or Post Acute Services In home services   Type of Home Care Services Home nursing visits   Expected Discharge Disposition Home H   Financial Resource Strain   How hard is it for you to pay for the very basics like food, housing, medical care, and heating? Not very   Housing Stability   In the last 12 months, was there a time when you were not able to pay the mortgage or rent on time? N   In the past 12 months, how many times have you moved where you were living? 1   At any time in the past 12 months, were you homeless or living in a shelter (including now)? N   Transportation Needs   In the past 12 months, has lack of transportation kept you from medical appointments or from getting medications? no   In the past 12 months, has lack of transportation kept you from meetings, work, or from getting things needed for daily living? No   Patient Choice   Provider Choice list and CMS website (https://medicare.gov/care-compare#search) for post-acute Quality and Resource Measure Data were provided and reviewed with: Patient     Met with patient at bedside. Admitted for Pneumonia, pleural effusion and Fx R humerus. H/O lung CA with bone mets.  Pt lives with spouse who assists patient as needed. No HHC. Pt uses a crutch and has uses home oxygen at 2-4 L NC at baseline. Pt does not recall is oxygen supplier.  PCP is Matilda Garcia. Pt feels he was able to manage his health and understands his medications. Spouse provides transport. Pt has been working with a financial counselor at his cancer center for assistance with medical bills. Is active with palliative care. Pt hopes to return home on discharge. TCC team will continue to follow for potential discharge needs.    Patient: Kb Minaya    Procedure Summary     Date: 07/07/21 Room / Location: [facility] ENDOSCOPY 3 / [facility] ENDOSCOPY    Anesthesia Start: 0906 Anesthesia Stop: 0916    Procedure: ESOPHAGOGASTRODUODENOSCOPY WITH BOPSIES (N/A Esophagus) Diagnosis:       Epigastric pain      (Epigastric pain [R10.13])    Surgeons: Arjun Delgadillo MD Provider: Garret Bullock CRNA    Anesthesia Type: MAC ASA Status: 4          Anesthesia Type: MAC    Vitals  Vitals Value Taken Time   /78 07/07/21 0946   Temp 98.3 °F (36.8 °C) 07/07/21 0920   Pulse 68 07/07/21 0946   Resp 16 07/07/21 0946   SpO2 96 % 07/07/21 0946           Post Anesthesia Care and Evaluation    Patient location during evaluation: bedside  Patient participation: complete - patient participated  Level of consciousness: awake and alert  Pain score: 0  Pain management: satisfactory to patient  Airway patency: patent  Anesthetic complications: No anesthetic complications    Cardiovascular status: acceptable and hemodynamically stable  Respiratory status: acceptable  Hydration status: acceptable  No anesthesia care post op

## 2025-07-03 ENCOUNTER — HOSPITAL ENCOUNTER (EMERGENCY)
Facility: HOSPITAL | Age: 55
Discharge: HOME | End: 2025-07-03
Attending: EMERGENCY MEDICINE
Payer: MEDICARE

## 2025-07-03 VITALS
HEIGHT: 72 IN | TEMPERATURE: 98.3 F | RESPIRATION RATE: 18 BRPM | DIASTOLIC BLOOD PRESSURE: 92 MMHG | OXYGEN SATURATION: 98 % | BODY MASS INDEX: 34.94 KG/M2 | HEART RATE: 86 BPM | SYSTOLIC BLOOD PRESSURE: 143 MMHG | WEIGHT: 257.94 LBS

## 2025-07-03 DIAGNOSIS — L23.7 POISON IVY DERMATITIS: Primary | ICD-10-CM

## 2025-07-03 PROCEDURE — 25010000002 DEXAMETHASONE SODIUM PHOSPHATE 10 MG/ML SOLUTION

## 2025-07-03 PROCEDURE — 99282 EMERGENCY DEPT VISIT SF MDM: CPT | Performed by: EMERGENCY MEDICINE

## 2025-07-03 PROCEDURE — 96372 THER/PROPH/DIAG INJ SC/IM: CPT

## 2025-07-03 RX ORDER — PREDNISONE 50 MG/1
50 TABLET ORAL DAILY
Qty: 5 TABLET | Refills: 0 | Status: SHIPPED | OUTPATIENT
Start: 2025-07-03 | End: 2025-07-08

## 2025-07-03 RX ORDER — FAMOTIDINE 20 MG/1
20 TABLET, FILM COATED ORAL 2 TIMES DAILY
Qty: 14 TABLET | Refills: 0 | Status: SHIPPED | OUTPATIENT
Start: 2025-07-03 | End: 2025-07-10

## 2025-07-03 RX ORDER — DEXAMETHASONE SODIUM PHOSPHATE 10 MG/ML
10 INJECTION, SOLUTION INTRAMUSCULAR; INTRAVENOUS ONCE
Status: COMPLETED | OUTPATIENT
Start: 2025-07-03 | End: 2025-07-03

## 2025-07-03 RX ORDER — DIPHENHYDRAMINE HCL 50 MG
50 CAPSULE ORAL EVERY 6 HOURS PRN
Qty: 30 CAPSULE | Refills: 0 | Status: SHIPPED | OUTPATIENT
Start: 2025-07-03

## 2025-07-03 RX ADMIN — DEXAMETHASONE SODIUM PHOSPHATE 10 MG: 10 INJECTION INTRAMUSCULAR; INTRAVENOUS at 14:50

## 2025-07-03 NOTE — ED PROVIDER NOTES
" EMERGENCY DEPARTMENT ENCOUNTER    Pt Name: Kb Minaya  MRN: [id number]  Pt :   1970  Room Number:  24SF/24  Date of encounter:  7/3/2025  PCP: Vilma Cassidy MD  ED Provider: Eric Lerma PA-C    Historian: Patient, nursing notes      HPI:  Chief Complaint: Rash        Context: Kb Minaya is a 55 y.o. male who presents to the ED c/o rash to his bilateral upper and lower extremities after exposure to poison ivy plant several days ago.  Patient denies any swelling of his lips tongue or throat, difficulty breathing or swallowing, facial swelling, cough or wheezing, or any other complaint at this time.      PAST MEDICAL HISTORY  Past Medical History:   Diagnosis Date    Ankle fracture, left     Anxiety     Anxiety and depression     Asthma     Back pain     Blood clot in vein 2021    right leg-states has resolved now    Cirrhosis     CKD (chronic kidney disease)     pt denies 21    Colon polyp     Coronary artery disease     Depression     Diabetes mellitus     Esophageal varices     Fatty liver     GERD (gastroesophageal reflux disease)     H/O echocardiogram 2021    History of nuclear stress test 2020    \"I did ok with it\"    Hyperlipidemia     Hypertension     Kidney stone     Murmur     Myocardial infarction     MI IN  (stent x 1),  (no stents), 2020 (no stents with last MI).  Just saw Dr. Duenas recently for a f/u.  (assessed 21)    Pancreatitis     Pilonidal cyst     Plantar fasciitis     bilateral    Seasonal allergies     Sleep apnea     CPAP-\"I don't wear it every night\"    Thoracic disc disorder 2005    Urinary frequency     Wears glasses     Wrist fracture, left          PAST SURGICAL HISTORY  Past Surgical History:   Procedure Laterality Date    APPENDECTOMY      CARDIAC CATHETERIZATION  2006    stent x 1    CARDIAC CATHETERIZATION      no stents    CHOLECYSTECTOMY  2021    COLONOSCOPY  2024    COLONOSCOPY N/A " 11/23/2021    Procedure: COLONOSCOPY with biopsies;  Surgeon: Yaneth Bell MD;  Location: [facility] ENDOSCOPY;  Service: Gastroenterology;  Laterality: N/A;    CYST REMOVAL      lower part of spine.    ENDOSCOPY      ENDOSCOPY N/A 07/07/2021    Procedure: ESOPHAGOGASTRODUODENOSCOPY WITH BOPSIES;  Surgeon: Arjun Delgadillo MD;  Location: [facility] ENDOSCOPY;  Service: Gastroenterology;  Laterality: N/A;    ENDOSCOPY W/ BANDING N/A 03/17/2022    Procedure: ESOPHAGOGASTRODUODENOSCOPY WITH Biopsy;  Surgeon: Yaneth Bell MD;  Location: [facility] ENDOSCOPY;  Service: Gastroenterology;  Laterality: N/A;    FOOT SURGERY  2015    LIPOMA EXCISION      LIVER BIOPSY  8/23/21    PLANTAR FASCIA RELEASE      right    ROTATOR CUFF REPAIR Right 08/03/2022    SHOULDER SURGERY  2021    SKIN BIOPSY      benign    UPPER GASTROINTESTINAL ENDOSCOPY           FAMILY HISTORY  Family History   Problem Relation Age of Onset    Diabetes Mother     COPD Mother     Heart failure Mother     Heart disease Father     Lung cancer Father     Pancreatic cancer Paternal Aunt     Colon cancer Paternal Aunt          SOCIAL HISTORY  Social History     Socioeconomic History    Marital status:    Tobacco Use    Smoking status: Never    Smokeless tobacco: Never   Vaping Use    Vaping status: Never Used   Substance and Sexual Activity    Alcohol use: Not Currently    Drug use: No    Sexual activity: Yes     Partners: Female     Birth control/protection: None         ALLERGIES  Morphine and codeine, Simvastatin, Warfarin, Percocet [oxycodone-acetaminophen], Penicillins, and Pradaxa [dabigatran etexilate mesylate]        REVIEW OF SYSTEMS  Review of Systems   Constitutional:  Negative for chills and fever.   HENT:  Negative for congestion and sore throat.    Respiratory:  Negative for cough and shortness of breath.    Cardiovascular:  Negative for chest pain.   Gastrointestinal:  Negative for abdominal pain, nausea and vomiting.    Genitourinary:  Negative for dysuria.   Musculoskeletal:  Negative for back pain.   Skin:  Positive for rash. Negative for wound.   Neurological:  Negative for dizziness and headaches.   Psychiatric/Behavioral:  Negative for confusion.    All other systems reviewed and are negative.         All systems reviewed and negative except for those discussed in HPI.       PHYSICAL EXAM    I have reviewed the triage vital signs and nursing notes.    ED Triage Vitals [07/03/25 1414]   Temp Heart Rate Resp BP SpO2   98.3 °F (36.8 °C) 86 18 143/92 98 %      Temp src Heart Rate Source Patient Position BP Location FiO2 (%)   Oral Monitor Sitting Left arm --       Physical Exam  Vitals and nursing note reviewed.   Constitutional:       General: He is not in acute distress.     Appearance: Normal appearance. He is not ill-appearing, toxic-appearing or diaphoretic.   HENT:      Head: Normocephalic and atraumatic.      Right Ear: External ear normal.      Left Ear: External ear normal.      Nose: No congestion or rhinorrhea.      Mouth/Throat:      Mouth: Mucous membranes are moist.      Pharynx: Oropharynx is clear.   Eyes:      Conjunctiva/sclera: Conjunctivae normal.   Cardiovascular:      Rate and Rhythm: Normal rate.      Heart sounds: Normal heart sounds.   Pulmonary:      Effort: Pulmonary effort is normal. No respiratory distress.      Breath sounds: Normal breath sounds. No wheezing.   Abdominal:      Tenderness: There is no abdominal tenderness.   Musculoskeletal:      Cervical back: Normal range of motion and neck supple.      Right lower leg: No edema.      Left lower leg: No edema.   Skin:     Findings: Rash present. Rash is not macular or papular.      Comments: Erythematous vesicular rash to the bilateral upper and lower extremities, minimal surrounding erythema with no streaking of redness or warmth   Neurological:      Mental Status: He is alert.             LAB RESULTS  No results found for this or any previous  visit (from the past 24 hours).    If labs were ordered, I independently reviewed the results and considered them in treating the patient.        RADIOLOGY  No Radiology Exams Resulted Within Past 24 Hours        See radiologist's dictation for official interpretation.        PROCEDURES    Procedures    No orders to display       MEDICATIONS GIVEN IN ER    Medications   dexAMETHasone sodium phosphate injection 10 mg (has no administration in time range)         MEDICAL DECISION MAKING, PROGRESS, and CONSULTS    All labs, if obtained, have been independently reviewed by me.  All radiology studies, if obtained, have been reviewed by me and the radiologist dictating the report.  All EKG's, if obtained, have been independently viewed and interpreted by me/my attending physician.      Discussion below represents my analysis of pertinent findings related to patient's condition, differential diagnosis, treatment plan and final disposition.    55-year-old male presenting for rash to bilateral upper and lower extremities that is pruritic and began several days ago after plant exposure.  On exam patient does have a bilateral upper and lower extremity rash is mildly vesicular with several areas of streaking in keeping with a contact dermatitis likely secondary to poison ivy contact.  Dexamethasone IM shot was administered in the emergency department with prescriptions for prednisone Benadryl tablets Pepcid and Benadryl cream with recommendations to follow-up with his primary care provider.  No concerning signs or symptoms related to anaphylaxis or angioedema today.  The patient's vital signs and pulse oximetry is independently interpreted by me are all stable and within normal limits.  Patient is upright alert oriented in no acute distress and therefore I feel he is stable for discharge with suspected poison ivy dermatitis is primary etiology of rash.  Strict ED return precautions were explained and the patient verbalized  understanding of and agreement this plan of care.                       Differential diagnosis:    Differential diagnosis included but was not limited to urticaria, contact dermatitis, poison ivy dermatitis      Additional sources:    - Discussed/ obtained information from independent historians: None    - External (non-ED) record review: Previous medical records reviewed    - Chronic or social conditions impacting care: None    Orders placed during this visit:  No orders of the defined types were placed in this encounter.        Additional orders considered but not ordered: None      ED Course:    Consultants: None                Shared Decision Making:  After my consideration of clinical presentation and any laboratory/radiology studies obtained, I discussed the findings with the patient/patient representative who is in agreement with the treatment plan and the final disposition.   Risks and benefits of discharge and/or observation/admission were discussed.       AS OF 14:34 EDT VITALS:    BP - 143/92  HR - 86  TEMP - 98.3 °F (36.8 °C) (Oral)  O2 SATS - 98%                  DIAGNOSIS  Final diagnoses:   Poison ivy dermatitis         DISPOSITION  Discharge      Please note that portions of this document were completed with voice recognition software.      Eric Lerma PA-C  07/03/25 [id number]

## 2025-07-03 NOTE — DISCHARGE INSTRUCTIONS
We suspect that your rash is due to poison ivy given your recent plant contact and symptoms as well as the appearance of the rash.  We recommend follow-up with your primary care doctor and have given you a steroid shot as well as steroid pills, Benadryl tablets, Pepcid and Benadryl cream which you can apply to all affected areas of skin.  Return to the ER for any acute changes or worsening of your condition.

## 2025-07-03 NOTE — Clinical Note
[facility] EMERGENCY DEPARTMENT  [address] [address]  Phone: [phone]    Kb Minaya was seen and treated in our emergency department on 7/3/2025.  He may return to work on 07/05/2025.         Thank you for choosing [facility].    Eric Lerma PA-C

## 2025-07-30 ENCOUNTER — HOSPITAL ENCOUNTER (EMERGENCY)
Facility: HOSPITAL | Age: 55
Discharge: HOME | End: 2025-07-30
Attending: EMERGENCY MEDICINE | Admitting: EMERGENCY MEDICINE
Payer: MEDICARE

## 2025-07-30 ENCOUNTER — APPOINTMENT (OUTPATIENT)
Dept: CT IMAGING | Facility: HOSPITAL | Age: 55
End: 2025-07-30
Payer: MEDICARE

## 2025-07-30 VITALS
HEIGHT: 73 IN | SYSTOLIC BLOOD PRESSURE: 160 MMHG | RESPIRATION RATE: 18 BRPM | OXYGEN SATURATION: 100 % | BODY MASS INDEX: 33.4 KG/M2 | TEMPERATURE: 97.5 F | HEART RATE: 69 BPM | DIASTOLIC BLOOD PRESSURE: 85 MMHG | WEIGHT: 252 LBS

## 2025-07-30 DIAGNOSIS — R19.7 DIARRHEA, UNSPECIFIED TYPE: ICD-10-CM

## 2025-07-30 DIAGNOSIS — K29.80 DUODENITIS: Primary | ICD-10-CM

## 2025-07-30 LAB
ADV 40+41 DNA STL QL NAA+NON-PROBE: NOT DETECTED
ALBUMIN SERPL-MCNC: 3.5 G/DL (ref 3.5–5.2)
ALBUMIN/GLOB SERPL: 1.2 G/DL
ALP SERPL-CCNC: 125 U/L (ref 39–117)
ALT SERPL W P-5'-P-CCNC: 43 U/L (ref 1–41)
ANION GAP SERPL CALCULATED.3IONS-SCNC: 11.5 MMOL/L (ref 5–15)
AST SERPL-CCNC: 40 U/L (ref 1–40)
ASTRO TYP 1-8 RNA STL QL NAA+NON-PROBE: NOT DETECTED
BASOPHILS # BLD AUTO: 0.03 10*3/MM3 (ref 0–0.2)
BASOPHILS NFR BLD AUTO: 0.6 % (ref 0–1.5)
BILIRUB SERPL-MCNC: 0.8 MG/DL (ref 0–1.2)
BILIRUB UR QL STRIP: NEGATIVE
BUN SERPL-MCNC: 12 MG/DL (ref 6–20)
BUN/CREAT SERPL: 13 (ref 7–25)
C CAYETANENSIS DNA STL QL NAA+NON-PROBE: NOT DETECTED
C COLI+JEJ+UPSA DNA STL QL NAA+NON-PROBE: NOT DETECTED
C DIFF GDH + TOXINS A+B STL QL IA.RAPID: NEGATIVE
C DIFF GDH + TOXINS A+B STL QL IA.RAPID: NEGATIVE
CALCIUM SPEC-SCNC: 8.9 MG/DL (ref 8.6–10.5)
CHLORIDE SERPL-SCNC: 109 MMOL/L (ref 98–107)
CLARITY UR: CLEAR
CO2 SERPL-SCNC: 20.5 MMOL/L (ref 22–29)
COLOR UR: YELLOW
CREAT SERPL-MCNC: 0.92 MG/DL (ref 0.76–1.27)
CRYPTOSP DNA STL QL NAA+NON-PROBE: NOT DETECTED
D-LACTATE SERPL-SCNC: 1.8 MMOL/L (ref 0.5–2)
DEPRECATED RDW RBC AUTO: 46.7 FL (ref 37–54)
E HISTOLYT DNA STL QL NAA+NON-PROBE: NOT DETECTED
EAEC PAA PLAS AGGR+AATA ST NAA+NON-PRB: NOT DETECTED
EC STX1+STX2 GENES STL QL NAA+NON-PROBE: NOT DETECTED
EGFRCR SERPLBLD CKD-EPI 2021: 97 ML/MIN/1.73
EOSINOPHIL # BLD AUTO: 0.06 10*3/MM3 (ref 0–0.4)
EOSINOPHIL NFR BLD AUTO: 1.2 % (ref 0.3–6.2)
EPEC EAE GENE STL QL NAA+NON-PROBE: DETECTED
ERYTHROCYTE [DISTWIDTH] IN BLOOD BY AUTOMATED COUNT: 14.2 % (ref 12.3–15.4)
ETEC LTA+ST1A+ST1B TOX ST NAA+NON-PROBE: NOT DETECTED
G LAMBLIA DNA STL QL NAA+NON-PROBE: NOT DETECTED
GLOBULIN UR ELPH-MCNC: 3 GM/DL
GLUCOSE SERPL-MCNC: 192 MG/DL (ref 65–99)
GLUCOSE UR STRIP-MCNC: ABNORMAL MG/DL
HCT VFR BLD AUTO: 44 % (ref 37.5–51)
HGB BLD-MCNC: 15 G/DL (ref 13–17.7)
HGB UR QL STRIP.AUTO: NEGATIVE
HOLD SPECIMEN: NORMAL
HOLD SPECIMEN: NORMAL
IMM GRANULOCYTES # BLD AUTO: 0.01 10*3/MM3 (ref 0–0.05)
IMM GRANULOCYTES NFR BLD AUTO: 0.2 % (ref 0–0.5)
KETONES UR QL STRIP: NEGATIVE
LEUKOCYTE ESTERASE UR QL STRIP.AUTO: NEGATIVE
LIPASE SERPL-CCNC: 25 U/L (ref 13–60)
LYMPHOCYTES # BLD AUTO: 1.24 10*3/MM3 (ref 0.7–3.1)
LYMPHOCYTES NFR BLD AUTO: 24.2 % (ref 19.6–45.3)
MCH RBC QN AUTO: 30.5 PG (ref 26.6–33)
MCHC RBC AUTO-ENTMCNC: 34.1 G/DL (ref 31.5–35.7)
MCV RBC AUTO: 89.6 FL (ref 79–97)
MONOCYTES # BLD AUTO: 0.36 10*3/MM3 (ref 0.1–0.9)
MONOCYTES NFR BLD AUTO: 7 % (ref 5–12)
NEUTROPHILS NFR BLD AUTO: 3.43 10*3/MM3 (ref 1.7–7)
NEUTROPHILS NFR BLD AUTO: 66.8 % (ref 42.7–76)
NITRITE UR QL STRIP: NEGATIVE
NOROVIRUS GI+II RNA STL QL NAA+NON-PROBE: NOT DETECTED
NRBC BLD AUTO-RTO: 0 /100 WBC (ref 0–0.2)
P SHIGELLOIDES DNA STL QL NAA+NON-PROBE: NOT DETECTED
PH UR STRIP.AUTO: 7 [PH] (ref 5–8)
PLATELET # BLD AUTO: 107 10*3/MM3 (ref 140–450)
PMV BLD AUTO: 11 FL (ref 6–12)
POTASSIUM SERPL-SCNC: 4.2 MMOL/L (ref 3.5–5.2)
PROT SERPL-MCNC: 6.5 G/DL (ref 6–8.5)
PROT UR QL STRIP: NEGATIVE
RBC # BLD AUTO: 4.91 10*6/MM3 (ref 4.14–5.8)
RVA RNA STL QL NAA+NON-PROBE: NOT DETECTED
S ENT+BONG DNA STL QL NAA+NON-PROBE: NOT DETECTED
SAPO I+II+IV+V RNA STL QL NAA+NON-PROBE: NOT DETECTED
SHIGELLA SP+EIEC IPAH ST NAA+NON-PROBE: NOT DETECTED
SODIUM SERPL-SCNC: 141 MMOL/L (ref 136–145)
SP GR UR STRIP: >=1.03 (ref 1–1.03)
UROBILINOGEN UR QL STRIP: ABNORMAL
V CHOL+PARA+VUL DNA STL QL NAA+NON-PROBE: NOT DETECTED
V CHOLERAE DNA STL QL NAA+NON-PROBE: NOT DETECTED
WBC NRBC COR # BLD AUTO: 5.13 10*3/MM3 (ref 3.4–10.8)
WHOLE BLOOD HOLD COAG: NORMAL
WHOLE BLOOD HOLD SPECIMEN: NORMAL
Y ENTEROCOL DNA STL QL NAA+NON-PROBE: NOT DETECTED

## 2025-07-30 PROCEDURE — 87507 IADNA-DNA/RNA PROBE TQ 12-25: CPT | Performed by: NURSE PRACTITIONER

## 2025-07-30 PROCEDURE — 25810000003 SODIUM CHLORIDE 0.9 % SOLUTION: Performed by: NURSE PRACTITIONER

## 2025-07-30 PROCEDURE — 96360 HYDRATION IV INFUSION INIT: CPT

## 2025-07-30 PROCEDURE — 83605 ASSAY OF LACTIC ACID: CPT | Performed by: EMERGENCY MEDICINE

## 2025-07-30 PROCEDURE — 83690 ASSAY OF LIPASE: CPT | Performed by: EMERGENCY MEDICINE

## 2025-07-30 PROCEDURE — 81003 URINALYSIS AUTO W/O SCOPE: CPT | Performed by: EMERGENCY MEDICINE

## 2025-07-30 PROCEDURE — 74177 CT ABD & PELVIS W/CONTRAST: CPT

## 2025-07-30 PROCEDURE — 25510000001 IOPAMIDOL 61 % SOLUTION: Performed by: EMERGENCY MEDICINE

## 2025-07-30 PROCEDURE — 87449 NOS EACH ORGANISM AG IA: CPT | Performed by: NURSE PRACTITIONER

## 2025-07-30 PROCEDURE — 80053 COMPREHEN METABOLIC PANEL: CPT | Performed by: EMERGENCY MEDICINE

## 2025-07-30 PROCEDURE — 85025 COMPLETE CBC W/AUTO DIFF WBC: CPT | Performed by: EMERGENCY MEDICINE

## 2025-07-30 PROCEDURE — 99285 EMERGENCY DEPT VISIT HI MDM: CPT | Performed by: EMERGENCY MEDICINE

## 2025-07-30 PROCEDURE — 87324 CLOSTRIDIUM AG IA: CPT | Performed by: NURSE PRACTITIONER

## 2025-07-30 RX ORDER — DICYCLOMINE HCL 20 MG
20 TABLET ORAL EVERY 6 HOURS PRN
Qty: 20 TABLET | Refills: 0 | Status: SHIPPED | OUTPATIENT
Start: 2025-07-30 | End: 2025-08-04

## 2025-07-30 RX ORDER — IOPAMIDOL 612 MG/ML
100 INJECTION, SOLUTION INTRAVASCULAR
Status: COMPLETED | OUTPATIENT
Start: 2025-07-30 | End: 2025-07-30

## 2025-07-30 RX ORDER — PANTOPRAZOLE SODIUM 40 MG/1
40 TABLET, DELAYED RELEASE ORAL DAILY
Qty: 30 TABLET | Refills: 0 | Status: SHIPPED | OUTPATIENT
Start: 2025-07-30 | End: 2025-08-29

## 2025-07-30 RX ORDER — SODIUM CHLORIDE 0.9 % (FLUSH) 0.9 %
10 SYRINGE (ML) INJECTION AS NEEDED
Status: DISCONTINUED | OUTPATIENT
Start: 2025-07-30 | End: 2025-07-30 | Stop reason: HOSPADM

## 2025-07-30 RX ADMIN — IOPAMIDOL 100 ML: 612 INJECTION, SOLUTION INTRAVENOUS at 13:33

## 2025-07-30 RX ADMIN — SODIUM CHLORIDE 1000 ML: 9 INJECTION, SOLUTION INTRAVENOUS at 12:42

## 2025-07-30 NOTE — Clinical Note
[facility] EMERGENCY DEPARTMENT  [address] [address]  Phone: [phone]    Kb Minaya was seen and treated in our emergency department on 7/30/2025.  He may return to work on 08/02/2025.         Thank you for choosing [facility].    Moreno Evangelista, APRN

## 2025-07-30 NOTE — ED PROVIDER NOTES
"Subjective:  History of Present Illness:    Patient is a 55-year-old male without contributing health history.  Presents to the ER today with mid abdominal pain and diarrhea that has been ongoing for a month.  Patient denies fever.  Denies dysuria, frequency or hematuria.  Denies OTC medication or home remedy.  Denies alleviating exacerbating factors    Nurses Notes reviewed and agree, including vitals, allergies, social history and prior medical history.     REVIEW OF SYSTEMS: All systems reviewed and not pertinent unless noted.  Review of Systems   Gastrointestinal:  Positive for abdominal pain and diarrhea.   All other systems reviewed and are negative.      Past Medical History:   Diagnosis Date    Ankle fracture, left     Anxiety     Anxiety and depression     Asthma     Back pain     Blood clot in vein 01/2021    right leg-states has resolved now    Cirrhosis     CKD (chronic kidney disease)     pt denies 7/6/21    Colon polyp     Coronary artery disease     Depression     Diabetes mellitus     Esophageal varices 003/01/2022    Fatty liver     GERD (gastroesophageal reflux disease)     H/O echocardiogram 02/2021    History of nuclear stress test 01/2020    \"I did ok with it\"    Hyperlipidemia     Hypertension     Kidney stone     Murmur     Myocardial infarction     MI IN 2006 (stent x 1), 2012 (no stents), 8/2/2020 (no stents with last MI).  Just saw Dr. Duenas recently for a f/u.  (assessed 11/22/21)    Pancreatitis 03/22    Pilonidal cyst     Plantar fasciitis     bilateral    Seasonal allergies     Sleep apnea     CPAP-\"I don't wear it every night\"    Thoracic disc disorder 2005    Urinary frequency     Wears glasses     Wrist fracture, left        Allergies:    Morphine and codeine, Simvastatin, Warfarin, Percocet [oxycodone-acetaminophen], Penicillins, and Pradaxa [dabigatran etexilate mesylate]      Past Surgical History:   Procedure Laterality Date    APPENDECTOMY      CARDIAC CATHETERIZATION  2006    " Ortho following. Recommendations:   -- Keep left long leg cast reinforced with bed pan bag at the top to prevent it from getting soiled with urine/feces  -- Continue incisional wound vac right ankle (placed 3/22)  -- Continue heparin TID for dvt ppx per primary team. Continue DVT ppx at discharge (heparin okay or can do ASA 81 bid or LVX)  -- From orthopedic standpoint okay to dc once medically ready (still needs permanent pacemaker placement)  -- FU 1 week after discharge for incisional wound vac removal from right ankle      "stent x 1    CARDIAC CATHETERIZATION  2012    no stents    CHOLECYSTECTOMY  08/23/2021    COLONOSCOPY  05/31/2024    COLONOSCOPY N/A 11/23/2021    Procedure: COLONOSCOPY with biopsies;  Surgeon: Yaneth Bell MD;  Location: [facility] ENDOSCOPY;  Service: Gastroenterology;  Laterality: N/A;    CYST REMOVAL      lower part of spine.    ENDOSCOPY      ENDOSCOPY N/A 07/07/2021    Procedure: ESOPHAGOGASTRODUODENOSCOPY WITH BOPSIES;  Surgeon: Arjun Delgadillo MD;  Location: [facility] ENDOSCOPY;  Service: Gastroenterology;  Laterality: N/A;    ENDOSCOPY W/ BANDING N/A 03/17/2022    Procedure: ESOPHAGOGASTRODUODENOSCOPY WITH Biopsy;  Surgeon: Yaneth Bell MD;  Location: [facility] ENDOSCOPY;  Service: Gastroenterology;  Laterality: N/A;    FOOT SURGERY  2015    LIPOMA EXCISION      LIVER BIOPSY  8/23/21    PLANTAR FASCIA RELEASE      right    ROTATOR CUFF REPAIR Right 08/03/2022    SHOULDER SURGERY  2021    SKIN BIOPSY      benign    UPPER GASTROINTESTINAL ENDOSCOPY           Social History     Socioeconomic History    Marital status:    Tobacco Use    Smoking status: Never    Smokeless tobacco: Never   Vaping Use    Vaping status: Never Used   Substance and Sexual Activity    Alcohol use: Not Currently    Drug use: No    Sexual activity: Yes     Partners: Female     Birth control/protection: None         Family History   Problem Relation Age of Onset    Diabetes Mother     COPD Mother     Heart failure Mother     Heart disease Father     Lung cancer Father     Pancreatic cancer Paternal Aunt     Colon cancer Paternal Aunt        Objective  Physical Exam:  /85   Pulse 69   Temp 97.5 °F (36.4 °C)   Resp 18   Ht 185.4 cm (73\")   Wt 114 kg (252 lb)   SpO2 100%   BMI 33.25 kg/m²      Physical Exam  Vitals and nursing note reviewed.   Constitutional:       Appearance: He is well-developed and normal weight.   HENT:      Head: Normocephalic and atraumatic.      Mouth/Throat:      Mouth: Mucous " membranes are moist.      Pharynx: Oropharynx is clear.   Eyes:      Extraocular Movements: Extraocular movements intact.      Pupils: Pupils are equal, round, and reactive to light.   Cardiovascular:      Rate and Rhythm: Normal rate and regular rhythm.   Pulmonary:      Effort: Pulmonary effort is normal.      Breath sounds: Normal breath sounds.   Abdominal:      General: Abdomen is flat. Bowel sounds are normal.      Palpations: Abdomen is soft.      Tenderness: There is abdominal tenderness in the periumbilical area.   Skin:     General: Skin is warm.      Capillary Refill: Capillary refill takes less than 2 seconds.   Neurological:      General: No focal deficit present.      Mental Status: He is alert and oriented to person, place, and time.   Psychiatric:         Mood and Affect: Mood normal.         Behavior: Behavior normal.         Procedures    ED Course:         Lab Results (last 24 hours)       Procedure Component Value Units Date/Time    CBC & Differential [[id number]]  (Abnormal) Collected: 07/30/25 1231    Specimen: Blood Updated: 07/30/25 1242    Narrative:      The following orders were created for panel order CBC & Differential.  Procedure                               Abnormality         Status                     ---------                               -----------         ------                     CBC Auto Differential[[id number]]        Abnormal            Final result               Scan Slide[[id number]]                                                                    Please view results for these tests on the individual orders.    Comprehensive Metabolic Panel [[id number]]  (Abnormal) Collected: 07/30/25 1231    Specimen: Blood Updated: 07/30/25 1255     Glucose 192 mg/dL      Comment: Glucose >180, Hemoglobin A1C recommended.        BUN 12.0 mg/dL      Creatinine 0.92 mg/dL      Sodium 141 mmol/L      Potassium 4.2 mmol/L      Chloride 109 mmol/L      CO2 20.5 mmol/L      Calcium 8.9 mg/dL       Total Protein 6.5 g/dL      Albumin 3.5 g/dL      ALT (SGPT) 43 U/L      AST (SGOT) 40 U/L      Alkaline Phosphatase 125 U/L      Total Bilirubin 0.8 mg/dL      Globulin 3.0 gm/dL      A/G Ratio 1.2 g/dL      BUN/Creatinine Ratio 13.0     Anion Gap 11.5 mmol/L      eGFR 97.0 mL/min/1.73     Narrative:      GFR Categories in Chronic Kidney Disease (CKD)              GFR Category          GFR (mL/min/1.73)    Interpretation  G1                    90 or greater        Normal or high (1)  G2                    60-89                Mild decrease (1)  G3a                   45-59                Mild to moderate decrease  G3b                   30-44                Moderate to severe decrease  G4                    15-29                Severe decrease  G5                    14 or less           Kidney failure    (1)In the absence of evidence of kidney disease, neither GFR category G1 or G2 fulfill the criteria for CKD.    eGFR calculation 2021 CKD-EPI creatinine equation, which does not include race as a factor    Lipase [[id number]]  (Normal) Collected: 07/30/25 1231    Specimen: Blood Updated: 07/30/25 1255     Lipase 25 U/L     Lactic Acid, Plasma [[id number]]  (Normal) Collected: 07/30/25 1231    Specimen: Blood Updated: 07/30/25 1253     Lactate 1.8 mmol/L     CBC Auto Differential [[id number]]  (Abnormal) Collected: 07/30/25 1231    Specimen: Blood Updated: 07/30/25 1242     WBC 5.13 10*3/mm3      RBC 4.91 10*6/mm3      Hemoglobin 15.0 g/dL      Hematocrit 44.0 %      MCV 89.6 fL      MCH 30.5 pg      MCHC 34.1 g/dL      RDW 14.2 %      RDW-SD 46.7 fl      MPV 11.0 fL      Platelets 107 10*3/mm3      Neutrophil % 66.8 %      Lymphocyte % 24.2 %      Monocyte % 7.0 %      Eosinophil % 1.2 %      Basophil % 0.6 %      Immature Grans % 0.2 %      Neutrophils, Absolute 3.43 10*3/mm3      Lymphocytes, Absolute 1.24 10*3/mm3      Monocytes, Absolute 0.36 10*3/mm3      Eosinophils, Absolute 0.06 10*3/mm3      Basophils,  Absolute 0.03 10*3/mm3      Immature Grans, Absolute 0.01 10*3/mm3      nRBC 0.0 /100 WBC     Urinalysis With Microscopic If Indicated (No Culture) - Urine, Clean Catch [[id number]]  (Abnormal) Collected: 07/30/25 1424    Specimen: Urine, Clean Catch Updated: 07/30/25 1431     Color, UA Yellow     Appearance, UA Clear     pH, UA 7.0     Specific Gravity, UA >=1.030     Glucose,  mg/dL (2+)     Ketones, UA Negative     Bilirubin, UA Negative     Blood, UA Negative     Protein, UA Negative     Leuk Esterase, UA Negative     Nitrite, UA Negative     Urobilinogen, UA 1.0 E.U./dL    Narrative:      Urine microscopic not indicated.             CT Abdomen Pelvis With Contrast  Result Date: 7/30/2025  PROCEDURE: CT ABDOMEN PELVIS W CONTRAST-  TECHNIQUE: IV contrast enhanced exam  HISTORY: Lower quadrant abdominal pain  COMPARISON: 02/20/2022  FINDINGS:  ABDOMEN: Liver has a cirrhotic appearance. Moderate splenomegaly is noted. Pancreas and adrenal glands are normal. Bilateral renal cysts are present. Hazy density of the central mesentery is noted, new from prior exam. This would suggest an inflammatory process such as pancreatitis or even mesenteric panniculitis.  Central mesenteric vessels are patent. There is no free air or free fluid.  PELVIS: No changes of appendicitis are present. There is no pelvic mass. Bladder and prostate are unremarkable.      Impression: 1. Interval development of hazy density within the central mesentery which could indicate an inflammatory process such as pancreatitis, duodenitis or mesenteric panniculitis. 2. No bowel obstruction or bowel wall thickening. 3. Cirrhosis with evidence of portal hypertension.   This study was performed with techniques to keep radiation doses as low as reasonably achievable (ALARA). Individualized dose reduction techniques using automated exposure control or adjustment of vA and/or kV according to the patient size were employed.   This report was signed and  finalized on 7/30/2025 2:28 PM by Elmo Valle MD.           MDM     Amount and/or Complexity of Data Reviewed  Decide to obtain previous medical records or to obtain history from someone other than the patient: yes        Initial impression of presenting illness: Patient is a 55-year-old male without contributing health history.  Presents to the ER today with mid abdominal pain and diarrhea that has been ongoing for a month.  Patient denies fever.  Denies dysuria, frequency or hematuria.  Denies OTC medication or home remedy.  Denies alleviating exacerbating factors    DDX: includes but is not limited to: C. difficile, norovirus, colitis, diverticulitis or other    Patient arrives stable with vitals interpreted by myself.     Pertinent features from physical exam: Lung sounds are clear bilaterally throughout.  Abdomen soft mild tenderness noted to region superior to umbilicus..  Bowel sounds are normal.  Heart sounds normal..    Initial diagnostic plan: CBC, CMP, lactic acid, urinalysis, occult blood, gastrointestinal panel, C. difficile    Results from initial plan were reviewed and interpreted by me revealing CBC is within appropriate range.  CMP is within appropriate range.  Urinalysis positive for glucose otherwise normal.  Lactic acid is negative.  Patient was unable to provide stool sample for stool testing.  CT abdomen pelvis with the following interval development of hazy density within the central mesentery which could indicate inflammatory process such as pancreatitis, duodenitis, mesenteric panniculitis.  No bowel obstruction or bowel wall thickening.  Cirrhosis with evidence of portal hypertension    Diagnostic information from other sources: Chart review    Interventions / Re-evaluation: Vital signs stable throughout encounter.  Patient received 1 L normal saline.    Results/clinical rationale were discussed with patient    Consultations/Discussion of results with other physicians: N/A    Disposition  plan: Patient is hemodynamically stable nontoxic-appearing and appropriate for discharge.  Will send patient home with dicyclomine and pantoprazole.  Let patient follow-up with GI.  Follow-up ER for new or worsening symptoms.  -----        Final diagnoses:   Duodenitis   Diarrhea, unspecified type          Moreno Evangelista, APRN  07/30/25 [id number]

## 2025-07-31 ENCOUNTER — RESULTS FOLLOW-UP (OUTPATIENT)
Dept: EMERGENCY DEPT | Facility: HOSPITAL | Age: 55
End: 2025-07-31
Payer: MEDICARE

## 2025-08-02 RX ORDER — AZITHROMYCIN 500 MG/1
500 TABLET, FILM COATED ORAL DAILY
Qty: 3 TABLET | Refills: 0 | Status: SHIPPED | OUTPATIENT
Start: 2025-08-02 | End: 2025-08-05

## 2025-08-02 NOTE — TELEPHONE ENCOUNTER
Spoke with patient. He reports when diarrhea first started 4.5 weeks ago, there was small amount of blood in stool. States diarrhea has been persistent. Given severity of symptoms, called in Azithromycin 500mg X 3 days for treatment. Advised close follow-up with PCP for re-evaluation.

## 2025-08-25 ENCOUNTER — TELEPHONE (OUTPATIENT)
Dept: GASTROENTEROLOGY | Facility: CLINIC | Age: 55
End: 2025-08-25
Payer: MEDICARE

## (undated) DEVICE — Device

## (undated) DEVICE — FRCP BX RADJAW4 NDL 2.8 240 STD OG

## (undated) DEVICE — LUBE JELLY PK/2.75GM STRL BX/144

## (undated) DEVICE — ENDOSCOPY PORT CONNECTOR FOR OLYMPUS® SCOPES: Brand: ERBE

## (undated) DEVICE — VLV SXN AIR/H2O ORCAPOD3 1P/U STRL

## (undated) DEVICE — CONMED SCOPE SAVER BITE BLOCK, 20X27 MM: Brand: SCOPE SAVER

## (undated) DEVICE — HYBRID TUBING/CAP SET FOR OLYMPUS® SCOPES: Brand: ERBE

## (undated) DEVICE — GLV SURG SENSICARE W/ALOE PF LF 8.5 STRL

## (undated) DEVICE — SUCTION CANISTER, 1500CC, RIGID: Brand: DEROYAL